# Patient Record
Sex: FEMALE | Race: WHITE | NOT HISPANIC OR LATINO | Employment: OTHER | RURAL
[De-identification: names, ages, dates, MRNs, and addresses within clinical notes are randomized per-mention and may not be internally consistent; named-entity substitution may affect disease eponyms.]

---

## 2018-03-12 ENCOUNTER — HISTORICAL (OUTPATIENT)
Dept: ADMINISTRATIVE | Facility: HOSPITAL | Age: 43
End: 2018-03-12

## 2018-03-13 LAB
LAB AP CLINICAL INFORMATION: NORMAL
LAB AP DIAGNOSIS - HISTORICAL: NORMAL
LAB AP GROSS PATHOLOGY - HISTORICAL: NORMAL
LAB AP SPECIMEN SUBMITTED - HISTORICAL: NORMAL

## 2018-04-10 ENCOUNTER — HISTORICAL (OUTPATIENT)
Dept: ADMINISTRATIVE | Facility: HOSPITAL | Age: 43
End: 2018-04-10

## 2020-03-12 ENCOUNTER — HISTORICAL (OUTPATIENT)
Dept: ADMINISTRATIVE | Facility: HOSPITAL | Age: 45
End: 2020-03-12

## 2020-03-20 ENCOUNTER — HISTORICAL (OUTPATIENT)
Dept: ADMINISTRATIVE | Facility: HOSPITAL | Age: 45
End: 2020-03-20

## 2020-03-20 LAB
BASOPHILS # BLD AUTO: 0.03 X10E3/UL (ref 0–0.2)
BASOPHILS NFR BLD AUTO: 0.5 % (ref 0–1)
BUN SERPL-MCNC: 11 MG/DL (ref 7–18)
CALCIUM SERPL-MCNC: 9.4 MG/DL (ref 8.5–10.1)
CHLORIDE SERPL-SCNC: 103 MMOL/L (ref 98–107)
CO2 SERPL-SCNC: 26 MMOL/L (ref 21–32)
CREAT SERPL-MCNC: 0.7 MG/DL (ref 0.55–1.02)
EOSINOPHIL # BLD AUTO: 0.04 X10E3/UL (ref 0–0.5)
EOSINOPHIL NFR BLD AUTO: 0.6 % (ref 1–4)
ERYTHROCYTE [DISTWIDTH] IN BLOOD BY AUTOMATED COUNT: 11.3 % (ref 11.5–14.5)
GLUCOSE SERPL-MCNC: 89 MG/DL (ref 74–106)
HCT VFR BLD AUTO: 42 % (ref 38–47)
HGB BLD-MCNC: 13.8 G/DL (ref 12–16)
IMM GRANULOCYTES # BLD AUTO: 0.02 X10E3/UL (ref 0–0.04)
IMM GRANULOCYTES NFR BLD: 0.3 % (ref 0–0.4)
LYMPHOCYTES # BLD AUTO: 2.15 X10E3/UL (ref 1–4.8)
LYMPHOCYTES NFR BLD AUTO: 32.8 % (ref 27–41)
MCH RBC QN AUTO: 31.2 PG (ref 27–31)
MCHC RBC AUTO-ENTMCNC: 32.9 G/DL (ref 32–36)
MCV RBC AUTO: 94.8 FL (ref 80–96)
MONOCYTES # BLD AUTO: 0.58 X10E3/UL (ref 0–0.8)
MONOCYTES NFR BLD AUTO: 8.8 % (ref 2–6)
MPC BLD CALC-MCNC: 8.4 FL (ref 9.4–12.4)
NEUTROPHILS # BLD AUTO: 3.74 X10E3/UL (ref 1.8–7.7)
NEUTROPHILS NFR BLD AUTO: 57 % (ref 53–65)
NRBC # BLD AUTO: 0 X10E3/UL (ref 0–0)
NRBC, AUTO (.00): 0 /100 (ref 0–0)
PLATELET # BLD AUTO: 322 X10E3/UL (ref 150–400)
POTASSIUM SERPL-SCNC: 3.9 MMOL/L (ref 3.5–5.1)
RBC # BLD AUTO: 4.43 X10E6/UL (ref 4.2–5.4)
SODIUM SERPL-SCNC: 137 MMOL/L (ref 136–145)
WBC # BLD AUTO: 6.56 X10E3/UL (ref 4.5–11)

## 2020-04-14 ENCOUNTER — HISTORICAL (OUTPATIENT)
Dept: ADMINISTRATIVE | Facility: HOSPITAL | Age: 45
End: 2020-04-14

## 2020-04-14 LAB — PAP RECOMMENDATION EXT: NORMAL

## 2020-04-15 LAB
LAB AP COMMENTS: NORMAL
LAB AP GENERAL CAT - HISTORICAL: NORMAL
LAB AP INTERPRETATION/RESULT - HISTORICAL: NEGATIVE
LAB AP SPECIMEN ADEQUACY - HISTORICAL: NORMAL
LAB AP SPECIMEN SUBMITTED - HISTORICAL: NORMAL

## 2020-05-12 ENCOUNTER — HISTORICAL (OUTPATIENT)
Dept: ADMINISTRATIVE | Facility: HOSPITAL | Age: 45
End: 2020-05-12

## 2020-10-27 ENCOUNTER — HISTORICAL (OUTPATIENT)
Dept: ADMINISTRATIVE | Facility: HOSPITAL | Age: 45
End: 2020-10-27

## 2021-03-31 RX ORDER — OMEPRAZOLE 20 MG/1
20 CAPSULE, DELAYED RELEASE ORAL DAILY
COMMUNITY
End: 2021-09-15 | Stop reason: SDUPTHER

## 2021-03-31 RX ORDER — ACETAMINOPHEN 500 MG
1 TABLET ORAL NIGHTLY
COMMUNITY
End: 2023-02-09

## 2021-03-31 RX ORDER — CITALOPRAM 40 MG/1
40 TABLET, FILM COATED ORAL DAILY
COMMUNITY
End: 2021-09-09 | Stop reason: SDUPTHER

## 2021-03-31 RX ORDER — FOLIC ACID 1 MG/1
2 TABLET ORAL NIGHTLY
COMMUNITY
End: 2021-04-27 | Stop reason: SDUPTHER

## 2021-03-31 RX ORDER — LANOLIN ALCOHOL/MO/W.PET/CERES
100 CREAM (GRAM) TOPICAL DAILY
COMMUNITY

## 2021-03-31 RX ORDER — LEVETIRACETAM 1000 MG/1
1000 TABLET ORAL 2 TIMES DAILY
COMMUNITY
End: 2021-04-07 | Stop reason: SDUPTHER

## 2021-03-31 RX ORDER — CHOLECALCIFEROL (VITAMIN D3) 25 MCG
1000 TABLET ORAL DAILY
COMMUNITY

## 2021-03-31 RX ORDER — MECLIZINE HYDROCHLORIDE 25 MG/1
25 TABLET ORAL 3 TIMES DAILY PRN
COMMUNITY
End: 2021-04-07 | Stop reason: SDUPTHER

## 2021-03-31 RX ORDER — LAMOTRIGINE 200 MG/1
1.5 TABLET ORAL 2 TIMES DAILY
COMMUNITY
End: 2021-04-07 | Stop reason: SDUPTHER

## 2021-04-07 ENCOUNTER — HOSPITAL ENCOUNTER (OUTPATIENT)
Dept: RADIOLOGY | Facility: HOSPITAL | Age: 46
Discharge: HOME | End: 2021-04-07
Payer: MEDICARE

## 2021-04-07 ENCOUNTER — OFFICE VISIT (OUTPATIENT)
Dept: NEUROLOGY | Facility: CLINIC | Age: 46
End: 2021-04-07
Payer: MEDICARE

## 2021-04-07 VITALS
SYSTOLIC BLOOD PRESSURE: 110 MMHG | HEIGHT: 64 IN | DIASTOLIC BLOOD PRESSURE: 78 MMHG | HEART RATE: 101 BPM | WEIGHT: 153 LBS | OXYGEN SATURATION: 98 % | BODY MASS INDEX: 26.12 KG/M2 | RESPIRATION RATE: 18 BRPM

## 2021-04-07 DIAGNOSIS — F32.A DEPRESSION, UNSPECIFIED DEPRESSION TYPE: ICD-10-CM

## 2021-04-07 DIAGNOSIS — R42 DIZZINESS: ICD-10-CM

## 2021-04-07 DIAGNOSIS — F41.9 ANXIETY: ICD-10-CM

## 2021-04-07 DIAGNOSIS — G44.229 CHRONIC TENSION-TYPE HEADACHE, NOT INTRACTABLE: ICD-10-CM

## 2021-04-07 DIAGNOSIS — G40.309 NONINTRACTABLE GENERALIZED IDIOPATHIC EPILEPSY WITHOUT STATUS EPILEPTICUS: Primary | ICD-10-CM

## 2021-04-07 DIAGNOSIS — Z12.39 SCREENING FOR BREAST CANCER: ICD-10-CM

## 2021-04-07 DIAGNOSIS — Z45.42 ENCOUNTER FOR FITTING AND ADJUSTMENT OF VAGUS NERVE STIMULATOR: ICD-10-CM

## 2021-04-07 PROCEDURE — 99999 PR PBB SHADOW E&M-EST. PATIENT-LVL IV: CPT | Mod: PBBFAC,,, | Performed by: NURSE PRACTITIONER

## 2021-04-07 PROCEDURE — 77067 SCR MAMMO BI INCL CAD: CPT | Mod: TC

## 2021-04-07 PROCEDURE — 99214 OFFICE O/P EST MOD 30 MIN: CPT | Mod: PBBFAC,25 | Performed by: NURSE PRACTITIONER

## 2021-04-07 PROCEDURE — 95970 ALYS NPGT W/O PRGRMG: CPT | Mod: PBBFAC | Performed by: NURSE PRACTITIONER

## 2021-04-07 PROCEDURE — 95970 PR ANALYZE NEUROSTIM,NO REPROG: ICD-10-PCS | Mod: S$PBB,,, | Performed by: NURSE PRACTITIONER

## 2021-04-07 PROCEDURE — 99213 PR OFFICE/OUTPT VISIT, EST, LEVL III, 20-29 MIN: ICD-10-PCS | Mod: S$PBB,,, | Performed by: NURSE PRACTITIONER

## 2021-04-07 PROCEDURE — 99999 PR PBB SHADOW E&M-EST. PATIENT-LVL IV: ICD-10-PCS | Mod: PBBFAC,,, | Performed by: NURSE PRACTITIONER

## 2021-04-07 PROCEDURE — 95970 ALYS NPGT W/O PRGRMG: CPT | Mod: S$PBB,,, | Performed by: NURSE PRACTITIONER

## 2021-04-07 PROCEDURE — 99213 OFFICE O/P EST LOW 20 MIN: CPT | Mod: S$PBB,,, | Performed by: NURSE PRACTITIONER

## 2021-04-07 RX ORDER — LAMOTRIGINE 200 MG/1
300 TABLET ORAL 2 TIMES DAILY
Qty: 270 TABLET | Refills: 3 | Status: SHIPPED | OUTPATIENT
Start: 2021-04-07 | End: 2021-08-09

## 2021-04-07 RX ORDER — LEVETIRACETAM 1000 MG/1
1000 TABLET ORAL 2 TIMES DAILY
Qty: 180 TABLET | Refills: 3 | Status: SHIPPED | OUTPATIENT
Start: 2021-04-07 | End: 2021-08-09

## 2021-04-07 RX ORDER — METHOCARBAMOL 500 MG/1
500 TABLET, FILM COATED ORAL 4 TIMES DAILY PRN
COMMUNITY

## 2021-04-07 RX ORDER — MECLIZINE HYDROCHLORIDE 25 MG/1
25 TABLET ORAL 3 TIMES DAILY PRN
Qty: 30 TABLET | Refills: 1 | Status: SHIPPED | OUTPATIENT
Start: 2021-04-07

## 2021-04-27 RX ORDER — FOLIC ACID 1 MG/1
2000 TABLET ORAL NIGHTLY
Qty: 180 TABLET | Refills: 3 | Status: SHIPPED | OUTPATIENT
Start: 2021-04-27 | End: 2022-04-04

## 2021-05-29 ENCOUNTER — HOSPITAL ENCOUNTER (EMERGENCY)
Facility: HOSPITAL | Age: 46
Discharge: HOME | End: 2021-05-29
Attending: FAMILY MEDICINE
Payer: MEDICARE

## 2021-05-29 VITALS
RESPIRATION RATE: 12 BRPM | BODY MASS INDEX: 27.6 KG/M2 | DIASTOLIC BLOOD PRESSURE: 68 MMHG | SYSTOLIC BLOOD PRESSURE: 106 MMHG | HEIGHT: 62 IN | OXYGEN SATURATION: 95 % | HEART RATE: 89 BPM | WEIGHT: 150 LBS

## 2021-05-29 DIAGNOSIS — G40.309 NONINTRACTABLE GENERALIZED IDIOPATHIC EPILEPSY WITHOUT STATUS EPILEPTICUS: Chronic | ICD-10-CM

## 2021-05-29 DIAGNOSIS — Z45.42 ENCOUNTER FOR FITTING AND ADJUSTMENT OF VAGUS NERVE STIMULATOR: ICD-10-CM

## 2021-05-29 DIAGNOSIS — G44.229 CHRONIC TENSION-TYPE HEADACHE, NOT INTRACTABLE: Chronic | ICD-10-CM

## 2021-05-29 DIAGNOSIS — F41.9 ANXIETY: Chronic | ICD-10-CM

## 2021-05-29 DIAGNOSIS — F32.A DEPRESSION: ICD-10-CM

## 2021-05-29 DIAGNOSIS — R56.9 SEIZURE: Primary | ICD-10-CM

## 2021-05-29 DIAGNOSIS — R42 DIZZINESS: ICD-10-CM

## 2021-05-29 LAB
ANION GAP SERPL CALCULATED.3IONS-SCNC: 12 MMOL/L (ref 7–16)
BASOPHILS # BLD AUTO: 0.05 K/UL (ref 0–0.2)
BASOPHILS NFR BLD AUTO: 0.9 % (ref 0–1)
BILIRUB UR QL STRIP: NEGATIVE
BUN SERPL-MCNC: 18 MG/DL (ref 7–18)
BUN/CREAT SERPL: 24 (ref 6–20)
CALCIUM SERPL-MCNC: 8.2 MG/DL (ref 8.5–10.1)
CHLORIDE SERPL-SCNC: 106 MMOL/L (ref 98–107)
CLARITY UR: ABNORMAL
CO2 SERPL-SCNC: 27 MMOL/L (ref 21–32)
COLOR UR: ABNORMAL
CREAT SERPL-MCNC: 0.74 MG/DL (ref 0.55–1.02)
DIFFERENTIAL METHOD BLD: ABNORMAL
EOSINOPHIL # BLD AUTO: 0.05 K/UL (ref 0–0.5)
EOSINOPHIL NFR BLD AUTO: 0.9 % (ref 1–4)
ERYTHROCYTE [DISTWIDTH] IN BLOOD BY AUTOMATED COUNT: 11.9 % (ref 11.5–14.5)
GLUCOSE SERPL-MCNC: 85 MG/DL (ref 74–106)
GLUCOSE UR STRIP-MCNC: NEGATIVE MG/DL
HCT VFR BLD AUTO: 38.1 % (ref 38–47)
HGB BLD-MCNC: 12.6 G/DL (ref 12–16)
IMM GRANULOCYTES # BLD AUTO: 0.02 K/UL (ref 0–0.04)
IMM GRANULOCYTES NFR BLD: 0.4 % (ref 0–0.4)
KETONES UR STRIP-SCNC: ABNORMAL MG/DL
LEUKOCYTE ESTERASE UR QL STRIP: NEGATIVE
LYMPHOCYTES # BLD AUTO: 1.04 K/UL (ref 1–4.8)
LYMPHOCYTES NFR BLD AUTO: 18.3 % (ref 27–41)
MCH RBC QN AUTO: 32.6 PG (ref 27–31)
MCHC RBC AUTO-ENTMCNC: 33.1 G/DL (ref 32–36)
MCV RBC AUTO: 98.7 FL (ref 80–96)
MONOCYTES # BLD AUTO: 0.34 K/UL (ref 0–0.8)
MONOCYTES NFR BLD AUTO: 6 % (ref 2–6)
MPC BLD CALC-MCNC: 9.4 FL (ref 9.4–12.4)
NEUTROPHILS # BLD AUTO: 4.19 K/UL (ref 1.8–7.7)
NEUTROPHILS NFR BLD AUTO: 73.5 % (ref 53–65)
NITRITE UR QL STRIP: NEGATIVE
NRBC # BLD AUTO: 0 X10E3/UL
NRBC, AUTO (.00): 0 %
PH UR STRIP: 7 PH UNITS
PLATELET # BLD AUTO: 281 K/UL (ref 150–400)
POTASSIUM SERPL-SCNC: 4.2 MMOL/L (ref 3.5–5.1)
PROT UR QL STRIP: ABNORMAL
RBC # BLD AUTO: 3.86 M/UL (ref 4.2–5.4)
RBC # UR STRIP: NEGATIVE /UL
SODIUM SERPL-SCNC: 141 MMOL/L (ref 136–145)
SP GR UR STRIP: 1.02
UROBILINOGEN UR STRIP-ACNC: 1 MG/DL
WBC # BLD AUTO: 5.69 K/UL (ref 4.5–11)

## 2021-05-29 PROCEDURE — 96365 THER/PROPH/DIAG IV INF INIT: CPT

## 2021-05-29 PROCEDURE — 85025 COMPLETE CBC W/AUTO DIFF WBC: CPT | Performed by: FAMILY MEDICINE

## 2021-05-29 PROCEDURE — 36415 COLL VENOUS BLD VENIPUNCTURE: CPT | Performed by: FAMILY MEDICINE

## 2021-05-29 PROCEDURE — 80048 BASIC METABOLIC PNL TOTAL CA: CPT | Performed by: FAMILY MEDICINE

## 2021-05-29 PROCEDURE — 81003 URINALYSIS AUTO W/O SCOPE: CPT | Performed by: FAMILY MEDICINE

## 2021-05-29 PROCEDURE — 96375 TX/PRO/DX INJ NEW DRUG ADDON: CPT

## 2021-05-29 PROCEDURE — 99284 EMERGENCY DEPT VISIT MOD MDM: CPT | Mod: 25

## 2021-05-29 PROCEDURE — 63600175 PHARM REV CODE 636 W HCPCS: Performed by: FAMILY MEDICINE

## 2021-05-29 PROCEDURE — 25000003 PHARM REV CODE 250: Performed by: FAMILY MEDICINE

## 2021-05-29 PROCEDURE — 99283 EMERGENCY DEPT VISIT LOW MDM: CPT | Mod: ,,, | Performed by: FAMILY MEDICINE

## 2021-05-29 PROCEDURE — 99283 PR EMERGENCY DEPT VISIT,LEVEL III: ICD-10-PCS | Mod: ,,, | Performed by: FAMILY MEDICINE

## 2021-05-29 PROCEDURE — 63600175 PHARM REV CODE 636 W HCPCS

## 2021-05-29 RX ORDER — TALC
3 POWDER (GRAM) TOPICAL
COMMUNITY
End: 2023-02-09

## 2021-05-29 RX ORDER — LORAZEPAM 2 MG/ML
2 INJECTION INTRAMUSCULAR
Status: COMPLETED | OUTPATIENT
Start: 2021-05-29 | End: 2021-05-29

## 2021-05-29 RX ORDER — LORAZEPAM 2 MG/ML
1 INJECTION INTRAMUSCULAR
Status: DISCONTINUED | OUTPATIENT
Start: 2021-05-29 | End: 2021-05-29

## 2021-05-29 RX ORDER — LEVETIRACETAM 1000 MG/1
1000 TABLET ORAL
COMMUNITY
End: 2021-08-09 | Stop reason: SDUPTHER

## 2021-05-29 RX ORDER — LORAZEPAM 2 MG/ML
INJECTION INTRAMUSCULAR
Status: COMPLETED
Start: 2021-05-29 | End: 2021-05-29

## 2021-05-29 RX ORDER — LAMOTRIGINE 200 MG/1
300 TABLET ORAL
COMMUNITY
End: 2021-08-09 | Stop reason: SDUPTHER

## 2021-05-29 RX ADMIN — LORAZEPAM 2 MG: 2 INJECTION INTRAMUSCULAR at 10:05

## 2021-05-29 RX ADMIN — LORAZEPAM 2 MG: 2 INJECTION INTRAMUSCULAR; INTRAVENOUS at 10:05

## 2021-05-29 RX ADMIN — LEVETIRACETAM 1000 MG: 100 INJECTION, SOLUTION, CONCENTRATE INTRAVENOUS at 10:05

## 2021-06-01 ENCOUNTER — OFFICE VISIT (OUTPATIENT)
Dept: NEUROLOGY | Facility: CLINIC | Age: 46
End: 2021-06-01
Payer: MEDICARE

## 2021-06-01 VITALS
HEIGHT: 62 IN | WEIGHT: 150 LBS | DIASTOLIC BLOOD PRESSURE: 78 MMHG | BODY MASS INDEX: 27.6 KG/M2 | RESPIRATION RATE: 18 BRPM | SYSTOLIC BLOOD PRESSURE: 132 MMHG | HEART RATE: 78 BPM

## 2021-06-01 DIAGNOSIS — G44.229 CHRONIC TENSION-TYPE HEADACHE, NOT INTRACTABLE: Chronic | ICD-10-CM

## 2021-06-01 DIAGNOSIS — F41.9 ANXIETY: Chronic | ICD-10-CM

## 2021-06-01 DIAGNOSIS — Z45.42 ENCOUNTER FOR FITTING AND ADJUSTMENT OF VAGUS NERVE STIMULATOR: ICD-10-CM

## 2021-06-01 DIAGNOSIS — G40.309 NONINTRACTABLE GENERALIZED IDIOPATHIC EPILEPSY WITHOUT STATUS EPILEPTICUS: Primary | Chronic | ICD-10-CM

## 2021-06-01 PROCEDURE — 95976 ALYS SMPL CN NPGT PRGRMG: CPT | Mod: ,,, | Performed by: NURSE PRACTITIONER

## 2021-06-01 PROCEDURE — 95976 PR ELEC ANALYSIS, IMPLT NEURO PULSE GEN, W/PRGRM, SMPL CRAN NERVE: ICD-10-PCS | Mod: ,,, | Performed by: NURSE PRACTITIONER

## 2021-06-01 PROCEDURE — 99215 OFFICE O/P EST HI 40 MIN: CPT | Mod: PBBFAC | Performed by: NURSE PRACTITIONER

## 2021-06-01 PROCEDURE — 99214 OFFICE O/P EST MOD 30 MIN: CPT | Mod: S$PBB,,, | Performed by: NURSE PRACTITIONER

## 2021-06-01 PROCEDURE — 99214 PR OFFICE/OUTPT VISIT, EST, LEVL IV, 30-39 MIN: ICD-10-PCS | Mod: S$PBB,,, | Performed by: NURSE PRACTITIONER

## 2021-06-08 ENCOUNTER — TELEPHONE (OUTPATIENT)
Dept: PRIMARY CARE CLINIC | Facility: CLINIC | Age: 46
End: 2021-06-08

## 2021-07-07 ENCOUNTER — OFFICE VISIT (OUTPATIENT)
Dept: NEUROLOGY | Facility: CLINIC | Age: 46
End: 2021-07-07
Payer: MEDICARE

## 2021-07-07 ENCOUNTER — HOSPITAL ENCOUNTER (EMERGENCY)
Facility: HOSPITAL | Age: 46
Discharge: HOME | End: 2021-07-07
Payer: MEDICARE

## 2021-07-07 VITALS
WEIGHT: 150 LBS | SYSTOLIC BLOOD PRESSURE: 122 MMHG | RESPIRATION RATE: 18 BRPM | HEIGHT: 62 IN | DIASTOLIC BLOOD PRESSURE: 80 MMHG | BODY MASS INDEX: 27.6 KG/M2 | OXYGEN SATURATION: 96 % | HEART RATE: 115 BPM

## 2021-07-07 VITALS
BODY MASS INDEX: 29.44 KG/M2 | DIASTOLIC BLOOD PRESSURE: 64 MMHG | TEMPERATURE: 100 F | WEIGHT: 160 LBS | HEIGHT: 62 IN | SYSTOLIC BLOOD PRESSURE: 110 MMHG | HEART RATE: 78 BPM | OXYGEN SATURATION: 98 % | RESPIRATION RATE: 13 BRPM

## 2021-07-07 DIAGNOSIS — R42 DIZZINESS: ICD-10-CM

## 2021-07-07 DIAGNOSIS — F32.A DEPRESSION: ICD-10-CM

## 2021-07-07 DIAGNOSIS — G40.309 NONINTRACTABLE GENERALIZED IDIOPATHIC EPILEPSY WITHOUT STATUS EPILEPTICUS: Primary | Chronic | ICD-10-CM

## 2021-07-07 DIAGNOSIS — F32.A DEPRESSION, UNSPECIFIED DEPRESSION TYPE: ICD-10-CM

## 2021-07-07 DIAGNOSIS — Z45.42 ENCOUNTER FOR FITTING AND ADJUSTMENT OF VAGUS NERVE STIMULATOR: ICD-10-CM

## 2021-07-07 DIAGNOSIS — G44.229 CHRONIC TENSION-TYPE HEADACHE, NOT INTRACTABLE: Chronic | ICD-10-CM

## 2021-07-07 DIAGNOSIS — G40.309 NONINTRACTABLE GENERALIZED IDIOPATHIC EPILEPSY WITHOUT STATUS EPILEPTICUS: Chronic | ICD-10-CM

## 2021-07-07 DIAGNOSIS — F41.9 ANXIETY: Chronic | ICD-10-CM

## 2021-07-07 DIAGNOSIS — G40.909 SEIZURE DISORDER: Primary | ICD-10-CM

## 2021-07-07 LAB
ALBUMIN SERPL BCP-MCNC: 3.3 G/DL (ref 3.5–5)
ALBUMIN/GLOB SERPL: 0.8 {RATIO}
ALP SERPL-CCNC: 98 U/L (ref 37–98)
ALT SERPL W P-5'-P-CCNC: 26 U/L (ref 13–56)
ANION GAP SERPL CALCULATED.3IONS-SCNC: 24 MMOL/L (ref 7–16)
AST SERPL W P-5'-P-CCNC: 21 U/L (ref 15–37)
BASOPHILS # BLD AUTO: 0.07 K/UL (ref 0–0.2)
BASOPHILS NFR BLD AUTO: 0.8 % (ref 0–1)
BILIRUB SERPL-MCNC: 0.4 MG/DL (ref 0–1.2)
BILIRUB UR QL STRIP: NEGATIVE
BUN SERPL-MCNC: 9 MG/DL (ref 7–18)
BUN/CREAT SERPL: 8 (ref 6–20)
CALCIUM SERPL-MCNC: 9 MG/DL (ref 8.5–10.1)
CHLORIDE SERPL-SCNC: 105 MMOL/L (ref 98–107)
CLARITY UR: CLEAR
CO2 SERPL-SCNC: 18 MMOL/L (ref 21–32)
COLOR UR: NORMAL
CREAT SERPL-MCNC: 1.2 MG/DL (ref 0.55–1.02)
DIFFERENTIAL METHOD BLD: ABNORMAL
EOSINOPHIL # BLD AUTO: 0.08 K/UL (ref 0–0.5)
EOSINOPHIL NFR BLD AUTO: 0.9 % (ref 1–4)
ERYTHROCYTE [DISTWIDTH] IN BLOOD BY AUTOMATED COUNT: 11.2 % (ref 11.5–14.5)
GLOBULIN SER-MCNC: 4.3 G/DL (ref 2–4)
GLUCOSE SERPL-MCNC: 135 MG/DL (ref 74–106)
GLUCOSE UR STRIP-MCNC: NEGATIVE MG/DL
HCT VFR BLD AUTO: 41.3 % (ref 38–47)
HGB BLD-MCNC: 13.1 G/DL (ref 12–16)
IMM GRANULOCYTES # BLD AUTO: 0.03 K/UL (ref 0–0.04)
IMM GRANULOCYTES NFR BLD: 0.3 % (ref 0–0.4)
KETONES UR STRIP-SCNC: NEGATIVE MG/DL
LAMOTRIGINE SERPL-MCNC: 17.7 ΜG/ML (ref 2.5–15)
LEUKOCYTE ESTERASE UR QL STRIP: NEGATIVE
LEVETIRACETAM SERPL-MCNC: 52.5 ΜG/ML (ref 12–46)
LYMPHOCYTES # BLD AUTO: 2.91 K/UL (ref 1–4.8)
LYMPHOCYTES NFR BLD AUTO: 32.2 % (ref 27–41)
MCH RBC QN AUTO: 31.1 PG (ref 27–31)
MCHC RBC AUTO-ENTMCNC: 31.7 G/DL (ref 32–36)
MCV RBC AUTO: 98.1 FL (ref 80–96)
MONOCYTES # BLD AUTO: 0.56 K/UL (ref 0–0.8)
MONOCYTES NFR BLD AUTO: 6.2 % (ref 2–6)
MPC BLD CALC-MCNC: 8.7 FL (ref 9.4–12.4)
NEUTROPHILS # BLD AUTO: 5.38 K/UL (ref 1.8–7.7)
NEUTROPHILS NFR BLD AUTO: 59.6 % (ref 53–65)
NITRITE UR QL STRIP: NEGATIVE
NRBC # BLD AUTO: 0 X10E3/UL
NRBC, AUTO (.00): 0 %
PH UR STRIP: 6 PH UNITS
PLATELET # BLD AUTO: 396 K/UL (ref 150–400)
POTASSIUM SERPL-SCNC: 3.6 MMOL/L (ref 3.5–5.1)
PROLACTIN SERPL-MCNC: 103.4 NG/ML
PROT SERPL-MCNC: 7.6 G/DL (ref 6.4–8.2)
PROT UR QL STRIP: NEGATIVE
RBC # BLD AUTO: 4.21 M/UL (ref 4.2–5.4)
RBC # UR STRIP: NEGATIVE /UL
SODIUM SERPL-SCNC: 143 MMOL/L (ref 136–145)
SP GR UR STRIP: <=1.005
UROBILINOGEN UR STRIP-ACNC: 0.2 MG/DL
WBC # BLD AUTO: 9.03 K/UL (ref 4.5–11)

## 2021-07-07 PROCEDURE — 80177 DRUG SCRN QUAN LEVETIRACETAM: CPT | Performed by: NURSE PRACTITIONER

## 2021-07-07 PROCEDURE — 96361 HYDRATE IV INFUSION ADD-ON: CPT

## 2021-07-07 PROCEDURE — 85025 COMPLETE CBC W/AUTO DIFF WBC: CPT | Performed by: NURSE PRACTITIONER

## 2021-07-07 PROCEDURE — 63600175 PHARM REV CODE 636 W HCPCS: Performed by: NURSE PRACTITIONER

## 2021-07-07 PROCEDURE — 80175 DRUG SCREEN QUAN LAMOTRIGINE: CPT | Performed by: NURSE PRACTITIONER

## 2021-07-07 PROCEDURE — 99215 OFFICE O/P EST HI 40 MIN: CPT | Mod: PBBFAC | Performed by: NURSE PRACTITIONER

## 2021-07-07 PROCEDURE — 81003 URINALYSIS AUTO W/O SCOPE: CPT | Performed by: NURSE PRACTITIONER

## 2021-07-07 PROCEDURE — 96365 THER/PROPH/DIAG IV INF INIT: CPT

## 2021-07-07 PROCEDURE — 80053 COMPREHEN METABOLIC PANEL: CPT | Performed by: NURSE PRACTITIONER

## 2021-07-07 PROCEDURE — 84146 ASSAY OF PROLACTIN: CPT | Performed by: NURSE PRACTITIONER

## 2021-07-07 PROCEDURE — 99283 PR EMERGENCY DEPT VISIT,LEVEL III: ICD-10-PCS | Mod: ,,, | Performed by: NURSE PRACTITIONER

## 2021-07-07 PROCEDURE — 96375 TX/PRO/DX INJ NEW DRUG ADDON: CPT

## 2021-07-07 PROCEDURE — 36415 COLL VENOUS BLD VENIPUNCTURE: CPT | Performed by: NURSE PRACTITIONER

## 2021-07-07 PROCEDURE — 99214 PR OFFICE/OUTPT VISIT, EST, LEVL IV, 30-39 MIN: ICD-10-PCS | Mod: S$PBB,,, | Performed by: NURSE PRACTITIONER

## 2021-07-07 PROCEDURE — 25000003 PHARM REV CODE 250: Performed by: NURSE PRACTITIONER

## 2021-07-07 PROCEDURE — 99283 EMERGENCY DEPT VISIT LOW MDM: CPT | Mod: ,,, | Performed by: NURSE PRACTITIONER

## 2021-07-07 PROCEDURE — 99284 EMERGENCY DEPT VISIT MOD MDM: CPT | Mod: 25

## 2021-07-07 PROCEDURE — 99214 OFFICE O/P EST MOD 30 MIN: CPT | Mod: S$PBB,,, | Performed by: NURSE PRACTITIONER

## 2021-07-07 RX ORDER — ACETAMINOPHEN 500 MG
1000 TABLET ORAL
Status: COMPLETED | OUTPATIENT
Start: 2021-07-07 | End: 2021-07-07

## 2021-07-07 RX ORDER — LORAZEPAM 2 MG/ML
1 INJECTION INTRAMUSCULAR
Status: COMPLETED | OUTPATIENT
Start: 2021-07-07 | End: 2021-07-07

## 2021-07-07 RX ADMIN — SODIUM CHLORIDE 1000 ML: 9 INJECTION, SOLUTION INTRAVENOUS at 09:07

## 2021-07-07 RX ADMIN — LEVETIRACETAM 1000 MG: 100 INJECTION, SOLUTION, CONCENTRATE INTRAVENOUS at 09:07

## 2021-07-07 RX ADMIN — ACETAMINOPHEN 1000 MG: 500 TABLET ORAL at 09:07

## 2021-07-07 RX ADMIN — LORAZEPAM 1 MG: 2 INJECTION INTRAMUSCULAR; INTRAVENOUS at 09:07

## 2021-08-04 ENCOUNTER — HOSPITAL ENCOUNTER (EMERGENCY)
Facility: HOSPITAL | Age: 46
Discharge: HOME | End: 2021-08-04
Attending: EMERGENCY MEDICINE
Payer: MEDICARE

## 2021-08-04 VITALS
OXYGEN SATURATION: 91 % | BODY MASS INDEX: 26.68 KG/M2 | DIASTOLIC BLOOD PRESSURE: 67 MMHG | HEART RATE: 89 BPM | HEIGHT: 62 IN | WEIGHT: 145 LBS | SYSTOLIC BLOOD PRESSURE: 114 MMHG | TEMPERATURE: 99 F | RESPIRATION RATE: 18 BRPM

## 2021-08-04 DIAGNOSIS — G44.229 CHRONIC TENSION-TYPE HEADACHE, NOT INTRACTABLE: Chronic | ICD-10-CM

## 2021-08-04 DIAGNOSIS — G40.309 NONINTRACTABLE GENERALIZED IDIOPATHIC EPILEPSY WITHOUT STATUS EPILEPTICUS: Chronic | ICD-10-CM

## 2021-08-04 DIAGNOSIS — F41.9 ANXIETY: Chronic | ICD-10-CM

## 2021-08-04 DIAGNOSIS — R42 DIZZINESS: ICD-10-CM

## 2021-08-04 DIAGNOSIS — G44.229 CHRONIC TENSION-TYPE HEADACHE, NOT INTRACTABLE: ICD-10-CM

## 2021-08-04 DIAGNOSIS — Z45.42 ENCOUNTER FOR FITTING AND ADJUSTMENT OF VAGUS NERVE STIMULATOR: ICD-10-CM

## 2021-08-04 DIAGNOSIS — F32.A DEPRESSION: ICD-10-CM

## 2021-08-04 DIAGNOSIS — N39.0 URINARY TRACT INFECTION WITHOUT HEMATURIA, SITE UNSPECIFIED: ICD-10-CM

## 2021-08-04 DIAGNOSIS — R56.9 SEIZURE: Primary | ICD-10-CM

## 2021-08-04 LAB
ALBUMIN SERPL BCP-MCNC: 3.1 G/DL (ref 3.5–5)
ALBUMIN/GLOB SERPL: 0.8 {RATIO}
ALP SERPL-CCNC: 107 U/L (ref 37–98)
ALT SERPL W P-5'-P-CCNC: 55 U/L (ref 13–56)
ANION GAP SERPL CALCULATED.3IONS-SCNC: 12 MMOL/L (ref 7–16)
AST SERPL W P-5'-P-CCNC: 55 U/L (ref 15–37)
BACTERIA #/AREA URNS HPF: ABNORMAL /HPF
BASOPHILS # BLD AUTO: 0.04 K/UL (ref 0–0.2)
BASOPHILS NFR BLD AUTO: 0.5 % (ref 0–1)
BILIRUB SERPL-MCNC: 0.4 MG/DL (ref 0–1.2)
BILIRUB UR QL STRIP: NEGATIVE
BUN SERPL-MCNC: 15 MG/DL (ref 7–18)
BUN/CREAT SERPL: 17 (ref 6–20)
CALCIUM SERPL-MCNC: 8.6 MG/DL (ref 8.5–10.1)
CHLORIDE SERPL-SCNC: 107 MMOL/L (ref 98–107)
CLARITY UR: ABNORMAL
CO2 SERPL-SCNC: 28 MMOL/L (ref 21–32)
COLOR UR: YELLOW
CREAT SERPL-MCNC: 0.88 MG/DL (ref 0.55–1.02)
DIFFERENTIAL METHOD BLD: ABNORMAL
EOSINOPHIL # BLD AUTO: 0.07 K/UL (ref 0–0.5)
EOSINOPHIL NFR BLD AUTO: 0.9 % (ref 1–4)
ERYTHROCYTE [DISTWIDTH] IN BLOOD BY AUTOMATED COUNT: 11.3 % (ref 11.5–14.5)
GLOBULIN SER-MCNC: 3.7 G/DL (ref 2–4)
GLUCOSE SERPL-MCNC: 99 MG/DL (ref 74–106)
GLUCOSE UR STRIP-MCNC: NEGATIVE MG/DL
HCT VFR BLD AUTO: 37 % (ref 38–47)
HGB BLD-MCNC: 12.2 G/DL (ref 12–16)
IMM GRANULOCYTES # BLD AUTO: 0.02 K/UL (ref 0–0.04)
IMM GRANULOCYTES NFR BLD: 0.2 % (ref 0–0.4)
KETONES UR STRIP-SCNC: NEGATIVE MG/DL
LEUKOCYTE ESTERASE UR QL STRIP: ABNORMAL
LYMPHOCYTES # BLD AUTO: 2.77 K/UL (ref 1–4.8)
LYMPHOCYTES NFR BLD AUTO: 34.6 % (ref 27–41)
MCH RBC QN AUTO: 30.7 PG (ref 27–31)
MCHC RBC AUTO-ENTMCNC: 33 G/DL (ref 32–36)
MCV RBC AUTO: 93.2 FL (ref 80–96)
MONOCYTES # BLD AUTO: 0.68 K/UL (ref 0–0.8)
MONOCYTES NFR BLD AUTO: 8.5 % (ref 2–6)
MPC BLD CALC-MCNC: 9.1 FL (ref 9.4–12.4)
MUCOUS THREADS #/AREA URNS HPF: ABNORMAL /HPF
NEUTROPHILS # BLD AUTO: 4.43 K/UL (ref 1.8–7.7)
NEUTROPHILS NFR BLD AUTO: 55.3 % (ref 53–65)
NITRITE UR QL STRIP: POSITIVE
NRBC # BLD AUTO: 0 X10E3/UL
NRBC, AUTO (.00): 0 %
PH UR STRIP: 6 PH UNITS
PLATELET # BLD AUTO: 323 K/UL (ref 150–400)
POTASSIUM SERPL-SCNC: 4.1 MMOL/L (ref 3.5–5.1)
PROT SERPL-MCNC: 6.8 G/DL (ref 6.4–8.2)
PROT UR QL STRIP: NEGATIVE
RBC # BLD AUTO: 3.97 M/UL (ref 4.2–5.4)
RBC # UR STRIP: ABNORMAL /UL
RBC #/AREA URNS HPF: ABNORMAL /HPF
SODIUM SERPL-SCNC: 143 MMOL/L (ref 136–145)
SP GR UR STRIP: 1.02
SQUAMOUS #/AREA URNS LPF: ABNORMAL /LPF
UROBILINOGEN UR STRIP-ACNC: 1 MG/DL
WBC # BLD AUTO: 8.01 K/UL (ref 4.5–11)
WBC #/AREA URNS HPF: ABNORMAL /HPF

## 2021-08-04 PROCEDURE — 36415 COLL VENOUS BLD VENIPUNCTURE: CPT | Performed by: EMERGENCY MEDICINE

## 2021-08-04 PROCEDURE — 99284 EMERGENCY DEPT VISIT MOD MDM: CPT | Mod: 25

## 2021-08-04 PROCEDURE — 87040 BLOOD CULTURE FOR BACTERIA: CPT | Mod: 59 | Performed by: EMERGENCY MEDICINE

## 2021-08-04 PROCEDURE — 87077 CULTURE AEROBIC IDENTIFY: CPT | Performed by: EMERGENCY MEDICINE

## 2021-08-04 PROCEDURE — 81003 URINALYSIS AUTO W/O SCOPE: CPT | Performed by: EMERGENCY MEDICINE

## 2021-08-04 PROCEDURE — 87186 SC STD MICRODIL/AGAR DIL: CPT | Performed by: EMERGENCY MEDICINE

## 2021-08-04 PROCEDURE — 99284 EMERGENCY DEPT VISIT MOD MDM: CPT | Mod: ,,, | Performed by: EMERGENCY MEDICINE

## 2021-08-04 PROCEDURE — 96365 THER/PROPH/DIAG IV INF INIT: CPT

## 2021-08-04 PROCEDURE — 93005 ELECTROCARDIOGRAM TRACING: CPT

## 2021-08-04 PROCEDURE — 63600175 PHARM REV CODE 636 W HCPCS: Performed by: EMERGENCY MEDICINE

## 2021-08-04 PROCEDURE — 93010 ELECTROCARDIOGRAM REPORT: CPT | Mod: ,,, | Performed by: INTERNAL MEDICINE

## 2021-08-04 PROCEDURE — 80053 COMPREHEN METABOLIC PANEL: CPT | Performed by: EMERGENCY MEDICINE

## 2021-08-04 PROCEDURE — 25000003 PHARM REV CODE 250: Performed by: EMERGENCY MEDICINE

## 2021-08-04 PROCEDURE — 99284 PR EMERGENCY DEPT VISIT,LEVEL IV: ICD-10-PCS | Mod: ,,, | Performed by: EMERGENCY MEDICINE

## 2021-08-04 PROCEDURE — 85025 COMPLETE CBC W/AUTO DIFF WBC: CPT | Performed by: EMERGENCY MEDICINE

## 2021-08-04 PROCEDURE — 96367 TX/PROPH/DG ADDL SEQ IV INF: CPT

## 2021-08-04 PROCEDURE — 81001 URINALYSIS AUTO W/SCOPE: CPT | Performed by: EMERGENCY MEDICINE

## 2021-08-04 PROCEDURE — 93010 EKG 12-LEAD: ICD-10-PCS | Mod: ,,, | Performed by: INTERNAL MEDICINE

## 2021-08-04 RX ORDER — SULFAMETHOXAZOLE AND TRIMETHOPRIM 800; 160 MG/1; MG/1
1 TABLET ORAL 2 TIMES DAILY
Qty: 14 TABLET | Refills: 0 | Status: SHIPPED | OUTPATIENT
Start: 2021-08-04 | End: 2021-08-11

## 2021-08-04 RX ADMIN — CEFTRIAXONE SODIUM 2 G: 2 INJECTION, POWDER, FOR SOLUTION INTRAMUSCULAR; INTRAVENOUS at 10:08

## 2021-08-04 RX ADMIN — LEVETIRACETAM 1000 MG: 100 INJECTION, SOLUTION INTRAVENOUS at 07:08

## 2021-08-06 LAB — UA COMPLETE W REFLEX CULTURE PNL UR: ABNORMAL

## 2021-08-09 ENCOUNTER — OFFICE VISIT (OUTPATIENT)
Dept: NEUROLOGY | Facility: CLINIC | Age: 46
End: 2021-08-09
Payer: MEDICARE

## 2021-08-09 VITALS
RESPIRATION RATE: 18 BRPM | SYSTOLIC BLOOD PRESSURE: 124 MMHG | HEART RATE: 94 BPM | WEIGHT: 145 LBS | HEIGHT: 62 IN | DIASTOLIC BLOOD PRESSURE: 84 MMHG | OXYGEN SATURATION: 96 % | BODY MASS INDEX: 26.68 KG/M2

## 2021-08-09 DIAGNOSIS — G44.229 CHRONIC TENSION-TYPE HEADACHE, NOT INTRACTABLE: Chronic | ICD-10-CM

## 2021-08-09 DIAGNOSIS — R42 DIZZINESS: ICD-10-CM

## 2021-08-09 DIAGNOSIS — G40.309 NONINTRACTABLE GENERALIZED IDIOPATHIC EPILEPSY WITHOUT STATUS EPILEPTICUS: Primary | Chronic | ICD-10-CM

## 2021-08-09 DIAGNOSIS — F32.A DEPRESSION, UNSPECIFIED DEPRESSION TYPE: ICD-10-CM

## 2021-08-09 DIAGNOSIS — Z45.42 ENCOUNTER FOR FITTING AND ADJUSTMENT OF VAGUS NERVE STIMULATOR: ICD-10-CM

## 2021-08-09 DIAGNOSIS — F41.9 ANXIETY: Chronic | ICD-10-CM

## 2021-08-09 PROCEDURE — 99215 OFFICE O/P EST HI 40 MIN: CPT | Mod: PBBFAC | Performed by: NURSE PRACTITIONER

## 2021-08-09 PROCEDURE — 99214 OFFICE O/P EST MOD 30 MIN: CPT | Mod: S$PBB,,, | Performed by: NURSE PRACTITIONER

## 2021-08-09 PROCEDURE — 99214 PR OFFICE/OUTPT VISIT, EST, LEVL IV, 30-39 MIN: ICD-10-PCS | Mod: S$PBB,,, | Performed by: NURSE PRACTITIONER

## 2021-08-09 RX ORDER — LEVETIRACETAM 1000 MG/1
1000 TABLET ORAL 2 TIMES DAILY
Qty: 270 TABLET | Refills: 3 | Status: SHIPPED | OUTPATIENT
Start: 2021-08-09 | End: 2022-08-10 | Stop reason: SDUPTHER

## 2021-08-09 RX ORDER — LAMOTRIGINE 200 MG/1
300 TABLET ORAL 2 TIMES DAILY
Qty: 270 TABLET | Refills: 3 | Status: SHIPPED | OUTPATIENT
Start: 2021-08-09 | End: 2022-04-04

## 2021-08-10 LAB
BACTERIA BLD CULT: NORMAL
BACTERIA BLD CULT: NORMAL

## 2021-09-09 ENCOUNTER — OFFICE VISIT (OUTPATIENT)
Dept: NEUROLOGY | Facility: CLINIC | Age: 46
End: 2021-09-09
Payer: MEDICARE

## 2021-09-09 VITALS
HEIGHT: 62 IN | SYSTOLIC BLOOD PRESSURE: 124 MMHG | OXYGEN SATURATION: 96 % | HEART RATE: 83 BPM | WEIGHT: 140 LBS | BODY MASS INDEX: 25.76 KG/M2 | DIASTOLIC BLOOD PRESSURE: 80 MMHG

## 2021-09-09 DIAGNOSIS — G44.229 CHRONIC TENSION-TYPE HEADACHE, NOT INTRACTABLE: Chronic | ICD-10-CM

## 2021-09-09 DIAGNOSIS — F32.A DEPRESSION, UNSPECIFIED DEPRESSION TYPE: ICD-10-CM

## 2021-09-09 DIAGNOSIS — G40.309 NONINTRACTABLE GENERALIZED IDIOPATHIC EPILEPSY WITHOUT STATUS EPILEPTICUS: Primary | Chronic | ICD-10-CM

## 2021-09-09 DIAGNOSIS — F41.9 ANXIETY: Chronic | ICD-10-CM

## 2021-09-09 DIAGNOSIS — Z45.42 ENCOUNTER FOR FITTING AND ADJUSTMENT OF VAGUS NERVE STIMULATOR: ICD-10-CM

## 2021-09-09 PROCEDURE — 99213 OFFICE O/P EST LOW 20 MIN: CPT | Mod: S$PBB,,, | Performed by: NURSE PRACTITIONER

## 2021-09-09 PROCEDURE — 95970 ALYS NPGT W/O PRGRMG: CPT | Mod: S$PBB,,, | Performed by: NURSE PRACTITIONER

## 2021-09-09 PROCEDURE — 99213 PR OFFICE/OUTPT VISIT, EST, LEVL III, 20-29 MIN: ICD-10-PCS | Mod: S$PBB,,, | Performed by: NURSE PRACTITIONER

## 2021-09-09 PROCEDURE — 99214 OFFICE O/P EST MOD 30 MIN: CPT | Mod: PBBFAC | Performed by: NURSE PRACTITIONER

## 2021-09-09 PROCEDURE — 95970 PR ANALYZE NEUROSTIM,NO REPROG: ICD-10-PCS | Mod: S$PBB,,, | Performed by: NURSE PRACTITIONER

## 2021-09-09 PROCEDURE — 95970 ALYS NPGT W/O PRGRMG: CPT | Mod: PBBFAC | Performed by: NURSE PRACTITIONER

## 2021-09-09 RX ORDER — CITALOPRAM 40 MG/1
40 TABLET, FILM COATED ORAL DAILY
Qty: 90 TABLET | Refills: 3 | Status: SHIPPED | OUTPATIENT
Start: 2021-09-09 | End: 2022-02-23 | Stop reason: SDUPTHER

## 2021-09-15 ENCOUNTER — OFFICE VISIT (OUTPATIENT)
Dept: FAMILY MEDICINE | Facility: CLINIC | Age: 46
End: 2021-09-15
Payer: MEDICARE

## 2021-09-15 DIAGNOSIS — K21.9 GASTROESOPHAGEAL REFLUX DISEASE, UNSPECIFIED WHETHER ESOPHAGITIS PRESENT: ICD-10-CM

## 2021-09-15 DIAGNOSIS — R09.81 NASAL CONGESTION: ICD-10-CM

## 2021-09-15 DIAGNOSIS — J02.9 SORE THROAT: Primary | ICD-10-CM

## 2021-09-15 DIAGNOSIS — E78.5 HYPERLIPIDEMIA, UNSPECIFIED HYPERLIPIDEMIA TYPE: ICD-10-CM

## 2021-09-15 DIAGNOSIS — E55.9 VITAMIN D DEFICIENCY: ICD-10-CM

## 2021-09-15 DIAGNOSIS — J10.1 INFLUENZA B: ICD-10-CM

## 2021-09-15 DIAGNOSIS — R05.9 COUGH: ICD-10-CM

## 2021-09-15 LAB
CTP QC/QA: YES
CTP QC/QA: YES
FLUAV AG NPH QL: NEGATIVE
FLUBV AG NPH QL: POSITIVE
S PYO RRNA THROAT QL PROBE: NEGATIVE
SARS-COV-2 AG RESP QL IA.RAPID: NEGATIVE

## 2021-09-15 PROCEDURE — 99214 OFFICE O/P EST MOD 30 MIN: CPT | Mod: ,,, | Performed by: FAMILY MEDICINE

## 2021-09-15 PROCEDURE — 99214 PR OFFICE/OUTPT VISIT, EST, LEVL IV, 30-39 MIN: ICD-10-PCS | Mod: ,,, | Performed by: FAMILY MEDICINE

## 2021-09-15 PROCEDURE — 87428 SARSCOV & INF VIR A&B AG IA: CPT | Mod: RHCUB | Performed by: FAMILY MEDICINE

## 2021-09-15 PROCEDURE — 87880 STREP A ASSAY W/OPTIC: CPT | Mod: RHCUB | Performed by: FAMILY MEDICINE

## 2021-09-15 RX ORDER — OSELTAMIVIR PHOSPHATE 75 MG/1
75 CAPSULE ORAL 2 TIMES DAILY
Qty: 10 CAPSULE | Refills: 0 | Status: SHIPPED | OUTPATIENT
Start: 2021-09-15 | End: 2021-09-20

## 2021-09-15 RX ORDER — OMEPRAZOLE 20 MG/1
20 CAPSULE, DELAYED RELEASE ORAL DAILY
Qty: 30 CAPSULE | Refills: 11 | Status: SHIPPED | OUTPATIENT
Start: 2021-09-15 | End: 2022-03-11 | Stop reason: SDUPTHER

## 2021-09-22 VITALS
TEMPERATURE: 97 F | HEART RATE: 74 BPM | BODY MASS INDEX: 26.68 KG/M2 | RESPIRATION RATE: 18 BRPM | OXYGEN SATURATION: 98 % | HEIGHT: 62 IN | WEIGHT: 145 LBS | SYSTOLIC BLOOD PRESSURE: 130 MMHG | DIASTOLIC BLOOD PRESSURE: 64 MMHG

## 2021-10-06 ENCOUNTER — TELEPHONE (OUTPATIENT)
Dept: FAMILY MEDICINE | Facility: CLINIC | Age: 46
End: 2021-10-06

## 2021-10-06 DIAGNOSIS — J02.9 SORE THROAT: Primary | ICD-10-CM

## 2021-10-07 RX ORDER — AZITHROMYCIN 250 MG/1
TABLET, FILM COATED ORAL
Qty: 6 TABLET | Refills: 0 | Status: SHIPPED | OUTPATIENT
Start: 2021-10-07 | End: 2022-03-11 | Stop reason: ALTCHOICE

## 2021-11-04 DIAGNOSIS — Z87.442 HISTORY OF KIDNEY STONES: Primary | ICD-10-CM

## 2021-12-08 ENCOUNTER — TELEPHONE (OUTPATIENT)
Dept: FAMILY MEDICINE | Facility: CLINIC | Age: 46
End: 2021-12-08
Payer: MEDICARE

## 2021-12-08 DIAGNOSIS — J02.9 SORE THROAT: Primary | ICD-10-CM

## 2021-12-08 RX ORDER — METHYLPREDNISOLONE 4 MG/1
TABLET ORAL
Qty: 1 EACH | Refills: 0 | Status: SHIPPED | OUTPATIENT
Start: 2021-12-08 | End: 2022-03-11 | Stop reason: ALTCHOICE

## 2021-12-08 RX ORDER — CEFUROXIME AXETIL 500 MG/1
500 TABLET ORAL 2 TIMES DAILY
Qty: 20 TABLET | Refills: 0 | Status: SHIPPED | OUTPATIENT
Start: 2021-12-08 | End: 2021-12-18

## 2022-02-23 ENCOUNTER — TELEPHONE (OUTPATIENT)
Dept: FAMILY MEDICINE | Facility: CLINIC | Age: 47
End: 2022-02-23
Payer: MEDICARE

## 2022-02-23 DIAGNOSIS — F32.A DEPRESSION, UNSPECIFIED DEPRESSION TYPE: ICD-10-CM

## 2022-02-23 DIAGNOSIS — F41.9 ANXIETY: Chronic | ICD-10-CM

## 2022-02-23 RX ORDER — CITALOPRAM 40 MG/1
40 TABLET, FILM COATED ORAL DAILY
Qty: 30 TABLET | Refills: 11 | Status: SHIPPED | OUTPATIENT
Start: 2022-02-23 | End: 2023-02-09 | Stop reason: SDUPTHER

## 2022-03-07 DIAGNOSIS — E78.5 HYPERLIPIDEMIA, UNSPECIFIED HYPERLIPIDEMIA TYPE: ICD-10-CM

## 2022-03-07 DIAGNOSIS — E55.9 VITAMIN D DEFICIENCY: Primary | ICD-10-CM

## 2022-03-11 ENCOUNTER — OFFICE VISIT (OUTPATIENT)
Dept: FAMILY MEDICINE | Facility: CLINIC | Age: 47
End: 2022-03-11
Payer: MEDICARE

## 2022-03-11 VITALS
RESPIRATION RATE: 18 BRPM | SYSTOLIC BLOOD PRESSURE: 102 MMHG | HEART RATE: 87 BPM | WEIGHT: 148 LBS | TEMPERATURE: 97 F | DIASTOLIC BLOOD PRESSURE: 70 MMHG | OXYGEN SATURATION: 98 % | HEIGHT: 66 IN | BODY MASS INDEX: 23.78 KG/M2

## 2022-03-11 DIAGNOSIS — E55.9 VITAMIN D DEFICIENCY: ICD-10-CM

## 2022-03-11 DIAGNOSIS — R09.81 CONGESTION OF NASAL SINUS: ICD-10-CM

## 2022-03-11 DIAGNOSIS — N92.1 MENORRHAGIA WITH IRREGULAR CYCLE: ICD-10-CM

## 2022-03-11 DIAGNOSIS — K21.9 GASTROESOPHAGEAL REFLUX DISEASE, UNSPECIFIED WHETHER ESOPHAGITIS PRESENT: Primary | ICD-10-CM

## 2022-03-11 DIAGNOSIS — Z79.899 HIGH RISK MEDICATION USE: ICD-10-CM

## 2022-03-11 DIAGNOSIS — J10.1 INFLUENZA B: ICD-10-CM

## 2022-03-11 DIAGNOSIS — Z20.822 EXPOSURE TO COVID-19 VIRUS: ICD-10-CM

## 2022-03-11 DIAGNOSIS — E78.5 HYPERLIPIDEMIA, UNSPECIFIED HYPERLIPIDEMIA TYPE: ICD-10-CM

## 2022-03-11 DIAGNOSIS — G40.909 SEIZURE DISORDER: ICD-10-CM

## 2022-03-11 DIAGNOSIS — R52 BODY ACHES: ICD-10-CM

## 2022-03-11 DIAGNOSIS — Z71.89 COMPLEX CARE COORDINATION: ICD-10-CM

## 2022-03-11 LAB
25(OH)D3 SERPL-MCNC: 83.5 NG/ML
CHOLEST SERPL-MCNC: 174 MG/DL (ref 0–200)
CHOLEST/HDLC SERPL: 2.9 {RATIO}
CTP QC/QA: YES
FLUAV AG NPH QL: NEGATIVE
FLUBV AG NPH QL: POSITIVE
HDLC SERPL-MCNC: 60 MG/DL (ref 40–60)
LDLC SERPL CALC-MCNC: 92 MG/DL
LDLC/HDLC SERPL: 1.5 {RATIO}
NONHDLC SERPL-MCNC: 114 MG/DL
SARS-COV-2 AG RESP QL IA.RAPID: NEGATIVE
TRIGL SERPL-MCNC: 112 MG/DL (ref 35–150)
TSH SERPL DL<=0.005 MIU/L-ACNC: 0.73 UIU/ML (ref 0.36–3.74)
VLDLC SERPL-MCNC: 22 MG/DL

## 2022-03-11 PROCEDURE — 99214 OFFICE O/P EST MOD 30 MIN: CPT | Mod: ,,, | Performed by: FAMILY MEDICINE

## 2022-03-11 PROCEDURE — 99214 PR OFFICE/OUTPT VISIT, EST, LEVL IV, 30-39 MIN: ICD-10-PCS | Mod: ,,, | Performed by: FAMILY MEDICINE

## 2022-03-11 PROCEDURE — 82306 VITAMIN D 25 HYDROXY: CPT | Mod: ,,, | Performed by: CLINICAL MEDICAL LABORATORY

## 2022-03-11 PROCEDURE — 80061 LIPID PANEL: ICD-10-PCS | Mod: ,,, | Performed by: CLINICAL MEDICAL LABORATORY

## 2022-03-11 PROCEDURE — 80061 LIPID PANEL: CPT | Mod: ,,, | Performed by: CLINICAL MEDICAL LABORATORY

## 2022-03-11 PROCEDURE — 84443 ASSAY THYROID STIM HORMONE: CPT | Mod: ,,, | Performed by: CLINICAL MEDICAL LABORATORY

## 2022-03-11 PROCEDURE — 84443 TSH: ICD-10-PCS | Mod: ,,, | Performed by: CLINICAL MEDICAL LABORATORY

## 2022-03-11 PROCEDURE — 87428 SARSCOV & INF VIR A&B AG IA: CPT | Mod: RHCUB | Performed by: FAMILY MEDICINE

## 2022-03-11 PROCEDURE — 82306 VITAMIN D: ICD-10-PCS | Mod: ,,, | Performed by: CLINICAL MEDICAL LABORATORY

## 2022-03-11 RX ORDER — OSELTAMIVIR PHOSPHATE 75 MG/1
75 CAPSULE ORAL 2 TIMES DAILY
Qty: 10 CAPSULE | Refills: 0 | Status: SHIPPED | OUTPATIENT
Start: 2022-03-11 | End: 2022-03-16

## 2022-03-11 RX ORDER — OMEPRAZOLE 20 MG/1
20 CAPSULE, DELAYED RELEASE ORAL DAILY
Qty: 30 CAPSULE | Refills: 11 | Status: SHIPPED | OUTPATIENT
Start: 2022-03-11 | End: 2023-01-24 | Stop reason: SDUPTHER

## 2022-03-11 NOTE — PROGRESS NOTES
"Subjective:       Patient ID: Brian Duong is a 46 y.o. female.    Chief Complaint: Sinus Problem, 6 month check up, Nausea, and Epistaxis    47 yo WF here for check up and lab. Complains of feeling bad with some scratchy throat and slight cough. Had vomiting earlier in the week. Having heavy menses for a few months now. Somewhat irregular and heavier for first few days of cycle. Last pap in 2020 with  who is no longer here.     Sinus Problem  Associated symptoms include coughing and a sore throat. Pertinent negatives include no headaches or shortness of breath.   Nausea  Associated symptoms include coughing, nausea, a sore throat and vomiting. Pertinent negatives include no abdominal pain, chest pain or headaches.   Epistaxis       Review of Systems   Constitutional: Negative for activity change.   HENT: Positive for nosebleeds, rhinorrhea and sore throat.    Eyes: Negative for visual disturbance.   Respiratory: Positive for cough. Negative for shortness of breath.    Cardiovascular: Negative for chest pain.   Gastrointestinal: Positive for nausea and vomiting. Negative for abdominal pain.   Genitourinary: Positive for menstrual irregularity and menstrual problem.   Neurological: Negative for headaches.   Psychiatric/Behavioral: Negative for dysphoric mood.         Office Visit on 03/11/2022   Component Date Value Ref Range Status    SARS Coronavirus 2 Antigen 03/11/2022 Negative  Negative Final    Rapid Influenza A Ag 03/11/2022 Negative  Negative Final    Rapid Influenza B Ag 03/11/2022 Positive (A) Negative Final     Acceptable 03/11/2022 Yes   Final       Objective:     Blood pressure 102/70, pulse 87, temperature 97 °F (36.1 °C), temperature source Temporal, resp. rate 18, height 5' 5.8" (1.671 m), weight 67.1 kg (148 lb), SpO2 98 %.      Physical Exam  Constitutional:       Appearance: Normal appearance.   HENT:      Head: Normocephalic and atraumatic.      Right Ear: External ear " normal.      Left Ear: External ear normal.      Nose: Nose normal.      Mouth/Throat:      Mouth: Mucous membranes are moist.   Eyes:      Conjunctiva/sclera: Conjunctivae normal.      Pupils: Pupils are equal, round, and reactive to light.   Cardiovascular:      Rate and Rhythm: Normal rate and regular rhythm.      Pulses: Normal pulses.      Heart sounds: Normal heart sounds.   Pulmonary:      Effort: Pulmonary effort is normal.      Breath sounds: Normal breath sounds.   Abdominal:      General: Abdomen is flat.      Palpations: Abdomen is soft.   Musculoskeletal:         General: Normal range of motion.      Cervical back: Normal range of motion and neck supple.   Skin:     General: Skin is warm and dry.   Neurological:      General: No focal deficit present.      Mental Status: She is alert and oriented to person, place, and time.   Psychiatric:         Mood and Affect: Mood normal.         Behavior: Behavior normal.         Assessment:       Problem List Items Addressed This Visit        Neuro    Seizure disorder    Relevant Orders    CBC Auto Differential    Comprehensive Metabolic Panel    Urinalysis, Reflex to Urine Culture Urine, Clean Catch       Cardiac/Vascular    Hyperlipidemia       Renal/    Menorrhagia with irregular cycle    Relevant Orders    Ambulatory referral/consult to Obstetrics / Gynecology    CBC Auto Differential       Endocrine    Vitamin D deficiency       GI    Gastroesophageal reflux disease - Primary    Relevant Medications    omeprazole (PRILOSEC) 20 MG capsule       Other    High risk medication use    Relevant Orders    CBC Auto Differential    Comprehensive Metabolic Panel    Urinalysis, Reflex to Urine Culture Urine, Clean Catch      Other Visit Diagnoses     Congestion of nasal sinus        Relevant Orders    POCT SARS-COV2 (COVID) with Flu Antigen (Completed)    Body aches        Relevant Orders    POCT SARS-COV2 (COVID) with Flu Antigen (Completed)    Exposure to COVID-19  virus        Relevant Orders    POCT SARS-COV2 (COVID) with Flu Antigen (Completed)    Influenza B        Relevant Medications    oseltamivir (TAMIFLU) 75 MG capsule          Plan:       RTC 6 months with lab.

## 2022-04-04 ENCOUNTER — TELEPHONE (OUTPATIENT)
Dept: FAMILY MEDICINE | Facility: CLINIC | Age: 47
End: 2022-04-04
Payer: MEDICARE

## 2022-04-04 DIAGNOSIS — G40.309 NONINTRACTABLE GENERALIZED IDIOPATHIC EPILEPSY WITHOUT STATUS EPILEPTICUS: Chronic | ICD-10-CM

## 2022-04-04 RX ORDER — LAMOTRIGINE 200 MG/1
TABLET ORAL
Qty: 270 TABLET | Refills: 3 | Status: SHIPPED | OUTPATIENT
Start: 2022-04-04 | End: 2022-04-11 | Stop reason: SDUPTHER

## 2022-04-11 DIAGNOSIS — G40.309 NONINTRACTABLE GENERALIZED IDIOPATHIC EPILEPSY WITHOUT STATUS EPILEPTICUS: Primary | Chronic | ICD-10-CM

## 2022-04-11 RX ORDER — LAMOTRIGINE 200 MG/1
TABLET ORAL
Qty: 270 TABLET | Refills: 3 | Status: SHIPPED | OUTPATIENT
Start: 2022-04-11 | End: 2022-09-29 | Stop reason: SDUPTHER

## 2022-04-13 ENCOUNTER — TELEPHONE (OUTPATIENT)
Dept: FAMILY MEDICINE | Facility: CLINIC | Age: 47
End: 2022-04-13
Payer: MEDICARE

## 2022-04-13 ENCOUNTER — HOSPITAL ENCOUNTER (OUTPATIENT)
Dept: RADIOLOGY | Facility: HOSPITAL | Age: 47
Discharge: HOME | End: 2022-04-13
Payer: MEDICARE

## 2022-04-13 VITALS — WEIGHT: 148 LBS | BODY MASS INDEX: 24.03 KG/M2

## 2022-04-13 DIAGNOSIS — Z12.31 OTHER SCREENING MAMMOGRAM: ICD-10-CM

## 2022-04-13 PROCEDURE — 77067 SCR MAMMO BI INCL CAD: CPT | Mod: TC

## 2022-04-13 NOTE — TELEPHONE ENCOUNTER
Well she may have to take the MOM or the Miralax twice a day for several days in a row to get some results., I guess she could try a Fleets enema as well.Make sure to drink plenty of fluids. ( We could give her a sample of Linzess 290 mcg to take for a few days that might take care of it)

## 2022-04-13 NOTE — TELEPHONE ENCOUNTER
I spoke with pt's mother and she states they will try the otc mom or miralax BID for several days and if not better she will call us and let us know.  They will try OTC meds 1st

## 2022-04-13 NOTE — TELEPHONE ENCOUNTER
"Pt's mother called stated she c/o not having a BM in 2 weeks.  She has tried otc suppositories and MOM and miralax, prune juice, Doculax and has had no results.  I asked her it they had tried magnesium citrate and she said yes.  She said pt said pt said "Had a little " results but she has not seen what the results were. She is eating her normal foods(stated she does not eat healthy) no nausea or vomiting, Is drinking as normal, no hard or distended belly. Denies any discomfort stated she just c/o not being able to go to the bathroom.  "

## 2022-05-31 ENCOUNTER — EXTERNAL CHRONIC CARE MANAGEMENT (OUTPATIENT)
Dept: FAMILY MEDICINE | Facility: CLINIC | Age: 47
End: 2022-05-31
Payer: MEDICARE

## 2022-05-31 PROCEDURE — G0511 CCM/BHI BY RHC/FQHC 20MIN MO: HCPCS | Mod: ,,, | Performed by: FAMILY MEDICINE

## 2022-05-31 PROCEDURE — G0511 PR CHRONIC CARE MGMT, RHC OR FQHC ONLY, 20 MINS OR MORE: ICD-10-PCS | Mod: ,,, | Performed by: FAMILY MEDICINE

## 2022-06-30 ENCOUNTER — EXTERNAL CHRONIC CARE MANAGEMENT (OUTPATIENT)
Dept: FAMILY MEDICINE | Facility: CLINIC | Age: 47
End: 2022-06-30
Payer: MEDICARE

## 2022-06-30 PROCEDURE — G0511 CCM/BHI BY RHC/FQHC 20MIN MO: HCPCS | Mod: ,,, | Performed by: FAMILY MEDICINE

## 2022-06-30 PROCEDURE — G0511 PR CHRONIC CARE MGMT, RHC OR FQHC ONLY, 20 MINS OR MORE: ICD-10-PCS | Mod: ,,, | Performed by: FAMILY MEDICINE

## 2022-07-11 ENCOUNTER — TELEPHONE (OUTPATIENT)
Dept: FAMILY MEDICINE | Facility: CLINIC | Age: 47
End: 2022-07-11
Payer: MEDICARE

## 2022-07-11 NOTE — TELEPHONE ENCOUNTER
Lucia from [facility] called stated pt scored a 20 on her depression screening,.  She is on medication.  Lucia wanted to make you aware. I called to speak with Brian and her step mother stated she is visiting her mother and she seems fine to her.  She said she is always a ,ittle down because she doesn't get out a lot but seems fine to her.  We moved her appointment to Friday for a check up and possible depression.

## 2022-07-31 ENCOUNTER — EXTERNAL CHRONIC CARE MANAGEMENT (OUTPATIENT)
Dept: FAMILY MEDICINE | Facility: CLINIC | Age: 47
End: 2022-07-31
Payer: MEDICARE

## 2022-07-31 PROCEDURE — G0511 PR CHRONIC CARE MGMT, RHC OR FQHC ONLY, 20 MINS OR MORE: ICD-10-PCS | Mod: ,,, | Performed by: FAMILY MEDICINE

## 2022-07-31 PROCEDURE — G0511 CCM/BHI BY RHC/FQHC 20MIN MO: HCPCS | Mod: ,,, | Performed by: FAMILY MEDICINE

## 2022-08-05 ENCOUNTER — HOSPITAL ENCOUNTER (EMERGENCY)
Facility: HOSPITAL | Age: 47
Discharge: HOME | End: 2022-08-05
Payer: MEDICARE

## 2022-08-05 VITALS
HEIGHT: 63 IN | HEART RATE: 74 BPM | OXYGEN SATURATION: 98 % | RESPIRATION RATE: 16 BRPM | WEIGHT: 144 LBS | BODY MASS INDEX: 25.52 KG/M2 | TEMPERATURE: 99 F | SYSTOLIC BLOOD PRESSURE: 116 MMHG | DIASTOLIC BLOOD PRESSURE: 68 MMHG
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
## 2022-08-05 DIAGNOSIS — R07.81 RIB PAIN ON LEFT SIDE: Primary | ICD-10-CM

## 2022-08-05 PROCEDURE — 99284 EMERGENCY DEPT VISIT MOD MDM: CPT

## 2022-08-05 PROCEDURE — 96372 THER/PROPH/DIAG INJ SC/IM: CPT

## 2022-08-05 PROCEDURE — 99283 EMERGENCY DEPT VISIT LOW MDM: CPT | Mod: ,,, | Performed by: NURSE PRACTITIONER

## 2022-08-05 PROCEDURE — 63600175 PHARM REV CODE 636 W HCPCS: Performed by: NURSE PRACTITIONER

## 2022-08-05 PROCEDURE — 99283 PR EMERGENCY DEPT VISIT,LEVEL III: ICD-10-PCS | Mod: ,,, | Performed by: NURSE PRACTITIONER

## 2022-08-05 RX ORDER — KETOROLAC TROMETHAMINE 30 MG/ML
30 INJECTION, SOLUTION INTRAMUSCULAR; INTRAVENOUS
Status: COMPLETED | OUTPATIENT
Start: 2022-08-05 | End: 2022-08-05

## 2022-08-05 RX ADMIN — KETOROLAC TROMETHAMINE 30 MG: 30 INJECTION, SOLUTION INTRAMUSCULAR at 01:08

## 2022-08-05 NOTE — ED PROVIDER NOTES
Encounter Date: 8/5/2022       History     Chief Complaint   Patient presents with    Rib Injury     Pt presents with pain to left side of ribs after a fall in the shower this morning around 8:00. Pt states she has epilepsy but she did not have a seizure this morning.         Review of patient's allergies indicates:  No Known Allergies  Past Medical History:   Diagnosis Date    Aneurysm     Arthritis     Chronic migraine without aura, intractable, without status migrainosus     Epilepsy characterized by intractable complex partial seizures     GERD (gastroesophageal reflux disease)     Headache     Hydrocephalus     Hypertension      Past Surgical History:   Procedure Laterality Date    VAGUS NERVE STIMULATOR INSERTION      VENTRICULOPERITONEAL SHUNT       Family History   Problem Relation Age of Onset    Cancer Mother     Thyroid disease Mother     Hyperlipidemia Father     Breast cancer Maternal Aunt      Social History     Tobacco Use    Smoking status: Never Smoker    Smokeless tobacco: Never Used   Substance Use Topics    Alcohol use: Never    Drug use: Never     Review of Systems   Constitutional: Negative for fever.   HENT: Negative for dental problem and sore throat.    Respiratory: Negative for shortness of breath.    Cardiovascular: Negative for chest pain.   Gastrointestinal: Negative for nausea.   Genitourinary: Negative for dysuria.   Musculoskeletal: Negative for back pain.        Left rib pain   Skin: Negative for rash.   Neurological: Negative for dizziness and weakness.   Hematological: Does not bruise/bleed easily.   Psychiatric/Behavioral: Negative for behavioral problems.       Physical Exam     Initial Vitals [08/05/22 1315]   BP Pulse Resp Temp SpO2   116/68 77 16 98.5 °F (36.9 °C) 98 %      MAP       --         Physical Exam    Nursing note and vitals reviewed.  Constitutional: She appears well-developed.   Cardiovascular: Normal rate and regular rhythm.   Pulmonary/Chest:  Breath sounds normal.   Abdominal: Bowel sounds are normal.       Musculoskeletal:         General: Normal range of motion.     Neurological: She is alert and oriented to person, place, and time.   Psychiatric: She has a normal mood and affect. Thought content normal.         Medical Screening Exam   See Full Note    ED Course   Procedures  Labs Reviewed - No data to display       Imaging Results          X-Ray Ribs 2 View Left (Final result)  Result time 08/05/22 14:04:14    Final result by Edouard Marte DO (08/05/22 14:04:14)                 Impression:      No acute findings      Electronically signed by: Edouard Marte  Date:    08/05/2022  Time:    14:04             Narrative:    EXAMINATION:  XR RIBS 2 VIEW LEFT    CLINICAL HISTORY:  Unspecified fall, initial encounter    TECHNIQUE:  XR RIBS 2 VIEW LEFT    COMPARISON:  10/27/2020 and 2019    FINDINGS:  No acute fracture or dislocation.    Left lung is clear.    Thickened tubing overlies the right chest.                                 Medications   ketorolac injection 30 mg (30 mg Intramuscular Given 8/5/22 1349)                       Clinical Impression:   Final diagnoses:  [W19.XXXA] Fall  [R07.81] Rib pain on left side (Primary)          ED Disposition Condition    Discharge Stable        ED Prescriptions     None        Follow-up Information    None          AMY Morales  08/05/22 7095

## 2022-08-10 ENCOUNTER — OFFICE VISIT (OUTPATIENT)
Dept: NEUROLOGY | Facility: CLINIC | Age: 47
End: 2022-08-10
Payer: MEDICARE

## 2022-08-10 VITALS
BODY MASS INDEX: 25.52 KG/M2 | OXYGEN SATURATION: 97 % | HEIGHT: 63 IN | RESPIRATION RATE: 18 BRPM | WEIGHT: 144 LBS | DIASTOLIC BLOOD PRESSURE: 68 MMHG | HEART RATE: 85 BPM | SYSTOLIC BLOOD PRESSURE: 114 MMHG

## 2022-08-10 DIAGNOSIS — G40.309 NONINTRACTABLE GENERALIZED IDIOPATHIC EPILEPSY WITHOUT STATUS EPILEPTICUS: Primary | Chronic | ICD-10-CM

## 2022-08-10 DIAGNOSIS — F41.9 ANXIETY: Chronic | ICD-10-CM

## 2022-08-10 DIAGNOSIS — F32.A DEPRESSION, UNSPECIFIED DEPRESSION TYPE: ICD-10-CM

## 2022-08-10 DIAGNOSIS — Z79.899 ENCOUNTER FOR LONG-TERM CURRENT USE OF MEDICATION: ICD-10-CM

## 2022-08-10 DIAGNOSIS — G44.229 CHRONIC TENSION-TYPE HEADACHE, NOT INTRACTABLE: ICD-10-CM

## 2022-08-10 DIAGNOSIS — Z45.42 ENCOUNTER FOR FITTING AND ADJUSTMENT OF VAGUS NERVE STIMULATOR: ICD-10-CM

## 2022-08-10 PROCEDURE — 95970 ALYS NPGT W/O PRGRMG: CPT | Mod: PBBFAC | Performed by: NURSE PRACTITIONER

## 2022-08-10 PROCEDURE — 95970 PR ANALYZE NEUROSTIM,NO REPROG: ICD-10-PCS | Mod: S$PBB,,, | Performed by: NURSE PRACTITIONER

## 2022-08-10 PROCEDURE — 99215 OFFICE O/P EST HI 40 MIN: CPT | Mod: PBBFAC | Performed by: NURSE PRACTITIONER

## 2022-08-10 PROCEDURE — 99214 OFFICE O/P EST MOD 30 MIN: CPT | Mod: S$PBB,,, | Performed by: NURSE PRACTITIONER

## 2022-08-10 PROCEDURE — 99214 PR OFFICE/OUTPT VISIT, EST, LEVL IV, 30-39 MIN: ICD-10-PCS | Mod: S$PBB,,, | Performed by: NURSE PRACTITIONER

## 2022-08-10 PROCEDURE — 95970 ALYS NPGT W/O PRGRMG: CPT | Mod: S$PBB,,, | Performed by: NURSE PRACTITIONER

## 2022-08-10 RX ORDER — LEVETIRACETAM 1000 MG/1
1000 TABLET ORAL 2 TIMES DAILY
Qty: 270 TABLET | Refills: 3 | Status: SHIPPED | OUTPATIENT
Start: 2022-08-10 | End: 2022-10-31 | Stop reason: SDUPTHER

## 2022-08-10 NOTE — PROGRESS NOTES
Subjective:       Patient ID: Brian Duong is a 46 y.o. female     Chief Complaint:    Chief Complaint   Patient presents with    Follow-up    Seizures          History of Present Illness  45 y/o WF followed in clinic for history of epilepsy, s/p VNS, hydrocephalus with history of  shunt placement >15 years ago, and headaches.  Family present with her.    Her last clinic visit and evaluation was 11 months ago.    No lamictal level checked since July 2021, it was elevated at that time but was reported as not a true trough level.    No reported grand mal seizures since last visit.    I checked her VNS today, we did not make any changes to her settings.    She has the Lamictal 200mg tablet taking 1.5 tabs BID and the Keppra 1000mg BID as well.    Headaches are reported as very well.  This was greatly improved after we got her started on the Aimovig injections.     She is also on Celexa for depression and denies any worsening in mood.         Review of Systems  Review of Systems   Constitutional: Negative for diaphoresis and fever.   HENT: Negative for congestion, hearing loss and tinnitus.    Eyes: Negative for blurred vision, double vision, photophobia, discharge and redness.   Respiratory: Negative for cough and shortness of breath.    Cardiovascular: Negative for chest pain.   Gastrointestinal: Negative for abdominal pain, nausea and vomiting.   Musculoskeletal: Negative for back pain, joint pain, myalgias and neck pain.   Skin: Negative for itching and rash.   Neurological: Positive for seizures and headaches. Negative for dizziness, tremors, sensory change, speech change, focal weakness, loss of consciousness and weakness.   Psychiatric/Behavioral: Negative for depression, hallucinations and memory loss. The patient does not have insomnia.       Objective:     NEUROLOGICAL EXAMINATION:     MENTAL STATUS   Oriented to person, place, and time.   Registration: recalls 3 of 3 objects. Recall at 5 minutes:  recalls 3 of 3 objects.   Attention: decreased. Concentration: decreased.   Speech: speech is normal   Level of consciousness: arousable by verbal stimuli  Knowledge: good and consistent with education.   Abnormal comprehension.     CRANIAL NERVES     CN II   Visual fields full to confrontation.   Visual acuity: normal  Right visual field deficit: none  Left visual field deficit: none     CN III, IV, VI   Pupils are equal, round, and reactive to light.  Extraocular motions are normal.   Right pupil: Size: 3 mm. Shape: regular. Reactivity: brisk. Consensual response: intact. Accommodation: intact.   Left pupil: Size: 3 mm. Shape: regular. Reactivity: brisk. Consensual response: intact. Accommodation: intact.   CN III: no CN III palsy  CN VI: no CN VI palsy  Nystagmus: none   Diplopia: none  Upgaze: normal  Downgaze: normal  Conjugate gaze: present  Vestibulo-ocular reflex: present    CN V   Facial sensation intact.   Right facial sensation deficit: none  Left facial sensation deficit: none  Right corneal reflex: normal  Left corneal reflex: normal    CN VII   Facial expression full, symmetric.   Right facial weakness: none  Left facial weakness: none  Right taste: normal  Left taste: normal    CN VIII   CN VIII normal.   Hearing: intact    CN IX, X   CN IX normal.   CN X normal.   Palate: symmetric    CN XI   CN XI normal.   Right sternocleidomastoid strength: normal  Left sternocleidomastoid strength: normal  Right trapezius strength: normal  Left trapezius strength: normal    CN XII   CN XII normal.   Tongue: not atrophic  Fasciculations: absent  Tongue deviation: none    MOTOR EXAM   Muscle bulk: normal  Overall muscle tone: normal  Right arm tone: normal  Left arm tone: normal  Right arm pronator drift: absent  Left arm pronator drift: absent  Right leg tone: normal  Left leg tone: normal    Strength   Right neck flexion: 5/5  Left neck flexion: 5/5  Right neck extension: 5/5  Left neck extension: 5/5  Right  deltoid: 5/5  Left deltoid: 5/5  Right biceps: 5/5  Left biceps: 5/5  Right triceps: 5/5  Left triceps: 5/5  Right wrist flexion: 5/5  Left wrist flexion: 5/5  Right wrist extension: 5/5  Left wrist extension: 5/5  Right interossei: 5/5  Left interossei: 5/  Right iliopsoas:   Left iliopsoas:   Right quadriceps:   Left quadriceps:   Right hamstrin/5  Left hamstrin/5  Right anterior tibial: 55  Left anterior tibial:   Right posterior tibial:   Left posterior tibial:   Right gastroc:   Left gastroc: 5    REFLEXES     Reflexes   Right brachioradialis: 2+  Left brachioradialis: 2+  Right biceps: 2+  Left biceps: 2+  Right triceps: 2+  Left triceps: 2+  Right patellar: 2+  Left patellar: 2+  Right achilles: 2+  Left achilles: 2+  Right plantar: normal  Left plantar: normal  Right Hardin: absent  Left Hardin: absent  Right ankle clonus: absent  Left ankle clonus: absent  Right pendular knee jerk: absent  Left pendular knee jerk: absent    SENSORY EXAM   Light touch normal.   Right arm light touch: normal  Left arm light touch: normal  Right leg light touch: normal  Left leg light touch: normal  Vibration normal.   Right arm vibration: normal  Left arm vibration: normal  Right leg vibration: normal  Left leg vibration: normal  Proprioception normal.   Right arm proprioception: normal  Left arm proprioception: normal  Right leg proprioception: normal  Left leg proprioception: normal  Pinprick normal.   Right arm pinprick: normal  Left arm pinprick: normal  Right leg pinprick: normal  Left leg pinprick: normal  Graphesthesia: normal  Romberg: negative  Stereognosis: normal    GAIT AND COORDINATION      Coordination   Finger to nose coordination: normal  Heel to shin coordination: normal  Tandem walking coordination: normal    Tremor   Resting tremor: present  Intention tremor: present  Action tremor: absent       Noted in wheelchair in clinic today        Physical Exam  Vitals and  nursing note reviewed.   Constitutional:       General: She is not in acute distress.     Appearance: She is not ill-appearing.   HENT:      Head: Normocephalic.   Eyes:      Extraocular Movements: EOM normal.      Pupils: Pupils are equal, round, and reactive to light.   Cardiovascular:      Rate and Rhythm: Regular rhythm. Tachycardia present.   Pulmonary:      Effort: Pulmonary effort is normal.      Breath sounds: Normal breath sounds.   Musculoskeletal:         General: No swelling or tenderness. Normal range of motion.      Cervical back: Normal range of motion and neck supple.      Right lower leg: No edema.      Left lower leg: No edema.   Skin:     General: Skin is warm and dry.      Coloration: Skin is not jaundiced.      Findings: No rash.   Neurological:      General: No focal deficit present.      Mental Status: She is oriented to person, place, and time.      GCS: GCS eye subscore is 4. GCS verbal subscore is 5. GCS motor subscore is 6.      Cranial Nerves: No cranial nerve deficit.      Sensory: No sensory deficit.      Motor: No weakness.      Coordination: Coordination abnormal. Finger-Nose-Finger Test, Heel to Shin Test and Romberg Test normal.      Gait: Gait abnormal. Tandem walk normal.      Deep Tendon Reflexes: Reflexes normal.      Reflex Scores:       Tricep reflexes are 2+ on the right side and 2+ on the left side.       Bicep reflexes are 2+ on the right side and 2+ on the left side.       Brachioradialis reflexes are 2+ on the right side and 2+ on the left side.       Patellar reflexes are 2+ on the right side and 2+ on the left side.       Achilles reflexes are 2+ on the right side and 2+ on the left side.  Psychiatric:         Speech: Speech normal.          Assessment:     Problem List Items Addressed This Visit        Neuro    Nonintractable generalized idiopathic epilepsy without status epilepticus - Primary (Chronic)    Relevant Medications    levETIRAcetam (KEPPRA) 1000 MG tablet     Chronic tension-type headache, not intractable (Chronic)    Relevant Medications    erenumab-aooe (AIMOVIG) 140 mg/mL autoinjector    Encounter for fitting and adjustment of vagus nerve stimulator       Psychiatric    Anxiety (Chronic)    Depression      Other Visit Diagnoses     Encounter for long-term current use of medication        Relevant Orders    Comprehensive Metabolic Panel    Lamotrigine Level           Plan:     1. Need labs next visit  2. VNS device evaluated, no changes were made  3. Continue the lamictal and keppra as directed  4. Notify clinic if any further seizures, as will need to obtain labs    Seizure precautions:  1. Take medications as directed  2. Avoid open flames and hot surfaces  3. Avoid elevations  4. Avoid swimming alone  5. Make sure getting plenty of sleep  6. Avoid alcohol  7. No driving or operating heavy machinery for 6 months after last known seizure

## 2022-08-10 NOTE — PATIENT INSTRUCTIONS
1. Continue the current keppra and lamictal as dircted  2. VNS evaluation done, no changes in settings today  3. Continue the celexa 40mg daily  4. Continue the once monthly Aimovig injections as directed      Seizure Precautions:  1. Take medications as directed  2. Avoid open flames and hot surfaces such as fires and grills  3. Avoid elevations such as ladders or stools  4. Avoid swimming alone  5. Make sure getting plenty of sleep, recommend 7-8 hours per day  6. Avoid alcohol and illicit drugs  7. No driving or operating heavy machinery for 6 months after last known seizure

## 2022-08-19 ENCOUNTER — OFFICE VISIT (OUTPATIENT)
Dept: FAMILY MEDICINE | Facility: CLINIC | Age: 47
End: 2022-08-19
Payer: MEDICARE

## 2022-08-19 VITALS
OXYGEN SATURATION: 98 % | BODY MASS INDEX: 24.8 KG/M2 | RESPIRATION RATE: 18 BRPM | WEIGHT: 140 LBS | HEIGHT: 63 IN | DIASTOLIC BLOOD PRESSURE: 70 MMHG | HEART RATE: 71 BPM | TEMPERATURE: 99 F | SYSTOLIC BLOOD PRESSURE: 106 MMHG

## 2022-08-19 DIAGNOSIS — N92.1 MENORRHAGIA WITH IRREGULAR CYCLE: ICD-10-CM

## 2022-08-19 DIAGNOSIS — Z91.81 AT RISK FOR FALLING: Chronic | ICD-10-CM

## 2022-08-19 DIAGNOSIS — F32.89 OTHER DEPRESSION: Primary | Chronic | ICD-10-CM

## 2022-08-19 DIAGNOSIS — G40.909 SEIZURE DISORDER: Chronic | ICD-10-CM

## 2022-08-19 DIAGNOSIS — Z79.899 HIGH RISK MEDICATION USE: ICD-10-CM

## 2022-08-19 LAB
ALBUMIN SERPL BCP-MCNC: 3.3 G/DL (ref 3.5–5)
ALBUMIN/GLOB SERPL: 0.9 {RATIO}
ALP SERPL-CCNC: 133 U/L (ref 39–100)
ALT SERPL W P-5'-P-CCNC: 34 U/L (ref 13–56)
ANION GAP SERPL CALCULATED.3IONS-SCNC: 10 MMOL/L (ref 7–16)
AST SERPL W P-5'-P-CCNC: 23 U/L (ref 15–37)
BASOPHILS # BLD AUTO: 0.06 K/UL (ref 0–0.2)
BASOPHILS NFR BLD AUTO: 1 % (ref 0–1)
BILIRUB SERPL-MCNC: 0.5 MG/DL (ref 0–1.2)
BUN SERPL-MCNC: 10 MG/DL (ref 7–18)
BUN/CREAT SERPL: 14 (ref 6–20)
CALCIUM SERPL-MCNC: 9.3 MG/DL (ref 8.5–10.1)
CHLORIDE SERPL-SCNC: 108 MMOL/L (ref 98–107)
CO2 SERPL-SCNC: 29 MMOL/L (ref 21–32)
CREAT SERPL-MCNC: 0.72 MG/DL (ref 0.55–1.02)
DIFFERENTIAL METHOD BLD: ABNORMAL
EGFR (NO RACE VARIABLE) (RUSH/TITUS): 105 ML/MIN/1.73M²
EOSINOPHIL # BLD AUTO: 0.09 K/UL (ref 0–0.5)
EOSINOPHIL NFR BLD AUTO: 1.5 % (ref 1–4)
ERYTHROCYTE [DISTWIDTH] IN BLOOD BY AUTOMATED COUNT: 11.5 % (ref 11.5–14.5)
GLOBULIN SER-MCNC: 3.5 G/DL (ref 2–4)
GLUCOSE SERPL-MCNC: 94 MG/DL (ref 74–106)
HCT VFR BLD AUTO: 39 % (ref 38–47)
HGB BLD-MCNC: 13.2 G/DL (ref 12–16)
IMM GRANULOCYTES # BLD AUTO: 0.03 K/UL (ref 0–0.04)
IMM GRANULOCYTES NFR BLD: 0.5 % (ref 0–0.4)
LYMPHOCYTES # BLD AUTO: 1.61 K/UL (ref 1–4.8)
LYMPHOCYTES NFR BLD AUTO: 26.5 % (ref 27–41)
MCH RBC QN AUTO: 33.6 PG (ref 27–31)
MCHC RBC AUTO-ENTMCNC: 33.8 G/DL (ref 32–36)
MCV RBC AUTO: 99.2 FL (ref 80–96)
MONOCYTES # BLD AUTO: 0.53 K/UL (ref 0–0.8)
MONOCYTES NFR BLD AUTO: 8.7 % (ref 2–6)
MPC BLD CALC-MCNC: 10.4 FL (ref 9.4–12.4)
NEUTROPHILS # BLD AUTO: 3.76 K/UL (ref 1.8–7.7)
NEUTROPHILS NFR BLD AUTO: 61.8 % (ref 53–65)
NRBC # BLD AUTO: 0 X10E3/UL
NRBC, AUTO (.00): 0 %
PLATELET # BLD AUTO: 398 K/UL (ref 150–400)
POTASSIUM SERPL-SCNC: 4.6 MMOL/L (ref 3.5–5.1)
PROT SERPL-MCNC: 6.8 G/DL (ref 6.4–8.2)
RBC # BLD AUTO: 3.93 M/UL (ref 4.2–5.4)
SODIUM SERPL-SCNC: 142 MMOL/L (ref 136–145)
WBC # BLD AUTO: 6.08 K/UL (ref 4.5–11)

## 2022-08-19 PROCEDURE — 99214 PR OFFICE/OUTPT VISIT, EST, LEVL IV, 30-39 MIN: ICD-10-PCS | Mod: ,,, | Performed by: FAMILY MEDICINE

## 2022-08-19 PROCEDURE — 85025 CBC WITH DIFFERENTIAL: ICD-10-PCS | Mod: ,,, | Performed by: CLINICAL MEDICAL LABORATORY

## 2022-08-19 PROCEDURE — 99214 OFFICE O/P EST MOD 30 MIN: CPT | Mod: ,,, | Performed by: FAMILY MEDICINE

## 2022-08-19 PROCEDURE — 80053 COMPREHEN METABOLIC PANEL: CPT | Mod: ,,, | Performed by: CLINICAL MEDICAL LABORATORY

## 2022-08-19 PROCEDURE — 80053 COMPREHENSIVE METABOLIC PANEL: ICD-10-PCS | Mod: ,,, | Performed by: CLINICAL MEDICAL LABORATORY

## 2022-08-19 PROCEDURE — 85025 COMPLETE CBC W/AUTO DIFF WBC: CPT | Mod: ,,, | Performed by: CLINICAL MEDICAL LABORATORY

## 2022-08-19 NOTE — PROGRESS NOTES
Subjective:       Patient ID: Brian Duong is a 47 y.o. female.    Chief Complaint: Fall (Has had 3 falls, one she feltt light headed, the other 2 she tripped or miss stepped ), Depression, Follwo up on depression (Feels like she is doing a little better with depression), and Weight Loss (Step mother is worried she may be losing weight)    48 yo WF here for recheck. Feels less depressed. Has fallen several times lately, but does not want to do any PT etc for gait and balance. Does not want to see GI for upper scope eval.    Review of Systems   Constitutional: Negative for activity change.   Eyes: Negative for visual disturbance.   Respiratory: Negative for shortness of breath.    Cardiovascular: Negative for chest pain.   Gastrointestinal: Negative for abdominal pain.   Neurological: Negative for headaches.   Psychiatric/Behavioral: Positive for dysphoric mood.         Office Visit on 08/19/2022   Component Date Value Ref Range Status    Sodium 08/19/2022 142  136 - 145 mmol/L Final    Potassium 08/19/2022 4.6  3.5 - 5.1 mmol/L Final    Chloride 08/19/2022 108 (A) 98 - 107 mmol/L Final    CO2 08/19/2022 29  21 - 32 mmol/L Final    Anion Gap 08/19/2022 10  7 - 16 mmol/L Final    Glucose 08/19/2022 94  74 - 106 mg/dL Final    BUN 08/19/2022 10  7 - 18 mg/dL Final    Creatinine 08/19/2022 0.72  0.55 - 1.02 mg/dL Final    BUN/Creatinine Ratio 08/19/2022 14  6 - 20 Final    Calcium 08/19/2022 9.3  8.5 - 10.1 mg/dL Final    Total Protein 08/19/2022 6.8  6.4 - 8.2 g/dL Final    Albumin 08/19/2022 3.3 (A) 3.5 - 5.0 g/dL Final    Globulin 08/19/2022 3.5  2.0 - 4.0 g/dL Final    A/G Ratio 08/19/2022 0.9   Final    Bilirubin, Total 08/19/2022 0.5  0.0 - 1.2 mg/dL Final    Alk Phos 08/19/2022 133 (A) 39 - 100 U/L Final    ALT 08/19/2022 34  13 - 56 U/L Final    AST 08/19/2022 23  15 - 37 U/L Final    eGFR 08/19/2022 105  >=60 mL/min/1.73m² Final    WBC 08/19/2022 6.08  4.50 - 11.00 K/uL Final    RBC  "08/19/2022 3.93 (A) 4.20 - 5.40 M/uL Final    Hemoglobin 08/19/2022 13.2  12.0 - 16.0 g/dL Final    Hematocrit 08/19/2022 39.0  38.0 - 47.0 % Final    MCV 08/19/2022 99.2 (A) 80.0 - 96.0 fL Final    MCH 08/19/2022 33.6 (A) 27.0 - 31.0 pg Final    MCHC 08/19/2022 33.8  32.0 - 36.0 g/dL Final    RDW 08/19/2022 11.5  11.5 - 14.5 % Final    Platelet Count 08/19/2022 398  150 - 400 K/uL Final    MPV 08/19/2022 10.4  9.4 - 12.4 fL Final    Neutrophils % 08/19/2022 61.8  53.0 - 65.0 % Final    Lymphocytes % 08/19/2022 26.5 (A) 27.0 - 41.0 % Final    Monocytes % 08/19/2022 8.7 (A) 2.0 - 6.0 % Final    Eosinophils % 08/19/2022 1.5  1.0 - 4.0 % Final    Basophils % 08/19/2022 1.0  0.0 - 1.0 % Final    Immature Granulocytes % 08/19/2022 0.5 (A) 0.0 - 0.4 % Final    nRBC, Auto 08/19/2022 0.0  <=0.0 % Final    Neutrophils, Abs 08/19/2022 3.76  1.80 - 7.70 K/uL Final    Lymphocytes, Absolute 08/19/2022 1.61  1.00 - 4.80 K/uL Final    Monocytes, Absolute 08/19/2022 0.53  0.00 - 0.80 K/uL Final    Eosinophils, Absolute 08/19/2022 0.09  0.00 - 0.50 K/uL Final    Basophils, Absolute 08/19/2022 0.06  0.00 - 0.20 K/uL Final    Immature Granulocytes, Absolute 08/19/2022 0.03  0.00 - 0.04 K/uL Final    nRBC, Absolute 08/19/2022 0.00  <=0.00 x10e3/uL Final    Diff Type 08/19/2022 Auto   Final       Objective:     Blood pressure 106/70, pulse 71, temperature 98.5 °F (36.9 °C), temperature source Oral, resp. rate 18, height 5' 3" (1.6 m), weight 63.5 kg (140 lb), SpO2 98 %.      Physical Exam  Constitutional:       Appearance: Normal appearance. She is normal weight.   HENT:      Head: Normocephalic and atraumatic.      Nose: Nose normal.      Mouth/Throat:      Mouth: Mucous membranes are moist.   Eyes:      Pupils: Pupils are equal, round, and reactive to light.   Cardiovascular:      Rate and Rhythm: Normal rate and regular rhythm.   Pulmonary:      Effort: Pulmonary effort is normal.   Abdominal:      General: " Abdomen is flat.   Skin:     General: Skin is warm.   Neurological:      Mental Status: She is alert and oriented to person, place, and time. Mental status is at baseline.      Gait: Gait abnormal.      Comments: Stable defects after hydrocephalus at birth   Psychiatric:         Mood and Affect: Mood normal.         Behavior: Behavior normal.         Thought Content: Thought content normal.         Assessment:       Problem List Items Addressed This Visit        Neuro    Seizure disorder       Psychiatric    Depression - Primary       Renal/    Menorrhagia with irregular cycle       Palliative Care    High risk medication use    At risk for falling          Plan:       RTC 6 months for recheck and lab if needed. ( neurology is checking some also) Chronic conditions and medications for each problem have been reviewed. Any changes needed have been made. Routine medications have been reviewed and refilled as needed.

## 2022-08-23 ENCOUNTER — TELEPHONE (OUTPATIENT)
Dept: NEUROLOGY | Facility: CLINIC | Age: 47
End: 2022-08-23
Payer: MEDICARE

## 2022-08-23 PROBLEM — Z91.81 AT RISK FOR FALLING: Status: ACTIVE | Noted: 2022-08-23

## 2022-08-23 NOTE — TELEPHONE ENCOUNTER
Called pt with lab results. She v/u.      ----- Message from AYM Reid sent at 8/23/2022  3:52 PM CDT -----  Labs look good

## 2022-08-31 ENCOUNTER — EXTERNAL CHRONIC CARE MANAGEMENT (OUTPATIENT)
Dept: FAMILY MEDICINE | Facility: CLINIC | Age: 47
End: 2022-08-31
Payer: MEDICARE

## 2022-08-31 PROCEDURE — G0511 PR CHRONIC CARE MGMT, RHC OR FQHC ONLY, 20 MINS OR MORE: ICD-10-PCS | Mod: ,,, | Performed by: FAMILY MEDICINE

## 2022-08-31 PROCEDURE — G0511 CCM/BHI BY RHC/FQHC 20MIN MO: HCPCS | Mod: ,,, | Performed by: FAMILY MEDICINE

## 2022-09-29 DIAGNOSIS — G40.309 NONINTRACTABLE GENERALIZED IDIOPATHIC EPILEPSY WITHOUT STATUS EPILEPTICUS: Chronic | ICD-10-CM

## 2022-09-29 RX ORDER — LAMOTRIGINE 200 MG/1
TABLET ORAL
Qty: 270 TABLET | Refills: 3 | Status: SHIPPED | OUTPATIENT
Start: 2022-09-29 | End: 2023-09-19 | Stop reason: SDUPTHER

## 2022-09-30 ENCOUNTER — EXTERNAL CHRONIC CARE MANAGEMENT (OUTPATIENT)
Dept: FAMILY MEDICINE | Facility: CLINIC | Age: 47
End: 2022-09-30
Payer: MEDICARE

## 2022-09-30 PROCEDURE — G0511 CCM/BHI BY RHC/FQHC 20MIN MO: HCPCS | Mod: ,,, | Performed by: FAMILY MEDICINE

## 2022-09-30 PROCEDURE — G0511 PR CHRONIC CARE MGMT, RHC OR FQHC ONLY, 20 MINS OR MORE: ICD-10-PCS | Mod: ,,, | Performed by: FAMILY MEDICINE

## 2022-10-31 ENCOUNTER — EXTERNAL CHRONIC CARE MANAGEMENT (OUTPATIENT)
Dept: FAMILY MEDICINE | Facility: CLINIC | Age: 47
End: 2022-10-31
Payer: MEDICARE

## 2022-10-31 DIAGNOSIS — G40.309 NONINTRACTABLE GENERALIZED IDIOPATHIC EPILEPSY WITHOUT STATUS EPILEPTICUS: Chronic | ICD-10-CM

## 2022-10-31 PROCEDURE — G0511 PR CHRONIC CARE MGMT, RHC OR FQHC ONLY, 20 MINS OR MORE: ICD-10-PCS | Mod: ,,, | Performed by: FAMILY MEDICINE

## 2022-10-31 PROCEDURE — G0511 CCM/BHI BY RHC/FQHC 20MIN MO: HCPCS | Mod: ,,, | Performed by: FAMILY MEDICINE

## 2022-10-31 RX ORDER — LEVETIRACETAM 1000 MG/1
1000 TABLET ORAL 2 TIMES DAILY
Qty: 270 TABLET | Refills: 3 | Status: SHIPPED | OUTPATIENT
Start: 2022-10-31 | End: 2023-09-19 | Stop reason: SDUPTHER

## 2022-11-10 ENCOUNTER — OFFICE VISIT (OUTPATIENT)
Dept: NEUROLOGY | Facility: CLINIC | Age: 47
End: 2022-11-10
Payer: MEDICARE

## 2022-11-10 VITALS
OXYGEN SATURATION: 96 % | BODY MASS INDEX: 24.63 KG/M2 | WEIGHT: 139 LBS | RESPIRATION RATE: 18 BRPM | HEIGHT: 63 IN | SYSTOLIC BLOOD PRESSURE: 110 MMHG | DIASTOLIC BLOOD PRESSURE: 72 MMHG | HEART RATE: 92 BPM

## 2022-11-10 DIAGNOSIS — F32.89 OTHER DEPRESSION: ICD-10-CM

## 2022-11-10 DIAGNOSIS — G44.229 CHRONIC TENSION-TYPE HEADACHE, NOT INTRACTABLE: Primary | Chronic | ICD-10-CM

## 2022-11-10 DIAGNOSIS — F41.9 ANXIETY: Chronic | ICD-10-CM

## 2022-11-10 DIAGNOSIS — G40.309 NONINTRACTABLE GENERALIZED IDIOPATHIC EPILEPSY WITHOUT STATUS EPILEPTICUS: Chronic | ICD-10-CM

## 2022-11-10 DIAGNOSIS — Z45.42 ENCOUNTER FOR FITTING AND ADJUSTMENT OF VAGUS NERVE STIMULATOR: ICD-10-CM

## 2022-11-10 PROCEDURE — 99212 OFFICE O/P EST SF 10 MIN: CPT | Mod: S$PBB,,, | Performed by: NURSE PRACTITIONER

## 2022-11-10 PROCEDURE — 99215 OFFICE O/P EST HI 40 MIN: CPT | Mod: PBBFAC | Performed by: NURSE PRACTITIONER

## 2022-11-10 PROCEDURE — 99212 PR OFFICE/OUTPT VISIT, EST, LEVL II, 10-19 MIN: ICD-10-PCS | Mod: S$PBB,,, | Performed by: NURSE PRACTITIONER

## 2022-11-10 NOTE — PROGRESS NOTES
Subjective:       Patient ID: Brian Duong is a 47 y.o. female     Chief Complaint:    Chief Complaint   Patient presents with    Follow-up    Seizures            History of Present Illness  46 y/o WF followed in clinic for history of epilepsy, s/p VNS, hydrocephalus with history of  shunt placement >15 years ago, and headaches.  Family present with her.    Last lamictal level was in August 2022, and was WNL at 13    No reported grand mal seizures since last visit.    I checked her VNS today, we did not make any changes to her settings.    She has the Lamictal 200mg tablet taking 1.5 tabs BID and the Keppra 1000mg BID as well.    Headaches are reported as very well.  This was greatly improved after we got her started on the Aimovig injections.     She is also on Celexa for depression and denies any worsening in mood.         Review of Systems  Review of Systems   Constitutional:  Negative for diaphoresis and fever.   HENT:  Negative for congestion, hearing loss and tinnitus.    Eyes:  Negative for blurred vision, double vision, photophobia, discharge and redness.   Respiratory:  Negative for cough and shortness of breath.    Cardiovascular:  Negative for chest pain.   Gastrointestinal:  Negative for abdominal pain, nausea and vomiting.   Musculoskeletal:  Negative for back pain, joint pain, myalgias and neck pain.   Skin:  Negative for itching and rash.   Neurological:  Positive for seizures and headaches. Negative for dizziness, tremors, sensory change, speech change, focal weakness, loss of consciousness and weakness.   Psychiatric/Behavioral:  Positive for depression and memory loss. Negative for hallucinations. The patient does not have insomnia.    All other systems reviewed and are negative.   Objective:     NEUROLOGICAL EXAMINATION:     MENTAL STATUS   Oriented to person, place, and time.   Registration: recalls 3 of 3 objects. Recall at 5 minutes: recalls 3 of 3 objects.   Attention: normal.  Concentration: normal.   Speech: speech is normal   Level of consciousness: alert  Knowledge: good and consistent with education.   Normal comprehension.     CRANIAL NERVES     CN II   Visual fields full to confrontation.   Visual acuity: normal  Right visual field deficit: none  Left visual field deficit: none     CN III, IV, VI   Pupils are equal, round, and reactive to light.  Extraocular motions are normal.   Right pupil: Size: 3 mm. Shape: regular. Reactivity: brisk. Consensual response: intact. Accommodation: intact.   Left pupil: Size: 3 mm. Shape: regular. Reactivity: brisk. Consensual response: intact. Accommodation: intact.   CN III: no CN III palsy  CN VI: no CN VI palsy  Nystagmus: none   Diplopia: none  Upgaze: normal  Downgaze: normal  Conjugate gaze: present  Vestibulo-ocular reflex: present    CN V   Facial sensation intact.   Right facial sensation deficit: none  Left facial sensation deficit: none  Right corneal reflex: normal  Left corneal reflex: normal    CN VII   Facial expression full, symmetric.   Right facial weakness: none  Left facial weakness: none  Right taste: normal  Left taste: normal    CN VIII   CN VIII normal.   Hearing: intact    CN IX, X   CN IX normal.   CN X normal.   Palate: symmetric    CN XI   CN XI normal.   Right sternocleidomastoid strength: normal  Left sternocleidomastoid strength: normal  Right trapezius strength: normal  Left trapezius strength: normal    CN XII   CN XII normal.   Tongue: not atrophic  Fasciculations: absent  Tongue deviation: none    MOTOR EXAM   Muscle bulk: normal  Overall muscle tone: normal  Right arm tone: normal  Left arm tone: increased  Right arm pronator drift: absent  Left arm pronator drift: absent  Right leg tone: normal  Left leg tone: increased    Strength   Right neck flexion: 5/5  Left neck flexion: 5/5  Right neck extension: 5/5  Left neck extension: 5/5  Right deltoid: 5/5  Left deltoid: 4/5  Right biceps: 5/5  Left biceps:  4/5  Right triceps: 5/5  Left triceps: 4/5  Right wrist flexion: 5/5  Left wrist flexion: 4/5  Right wrist extension: 5/5  Left wrist extension: 4/5  Right interossei: 5/5  Left interossei: 4/5  Right iliopsoas: 5/5  Left iliopsoas: 4/5  Right quadriceps: 5/5  Left quadriceps: 4/5  Right hamstrin/5  Left hamstrin/5  Right anterior tibial: 5/5  Left anterior tibial: 4/5  Right posterior tibial: 5/5  Left posterior tibial: 4/5  Right gastroc: 5/5  Left gastroc: 4/5    REFLEXES     Reflexes   Right brachioradialis: 2+  Left brachioradialis: 2+  Right biceps: 2+  Left biceps: 2+  Right triceps: 2+  Left triceps: 2+  Right patellar: 2+  Left patellar: 2+  Right achilles: 2+  Left achilles: 2+  Right plantar: normal  Left plantar: normal  Right Hardin: absent  Left Hardin: absent  Right ankle clonus: absent  Left ankle clonus: absent  Right pendular knee jerk: absent  Left pendular knee jerk: absent    SENSORY EXAM   Light touch normal.   Right arm light touch: normal  Left arm light touch: normal  Right leg light touch: normal  Left leg light touch: normal  Vibration normal.   Right arm vibration: normal  Left arm vibration: normal  Right leg vibration: normal  Left leg vibration: normal  Proprioception normal.   Right arm proprioception: normal  Left arm proprioception: normal  Right leg proprioception: normal  Left leg proprioception: normal  Pinprick normal.   Right arm pinprick: normal  Left arm pinprick: normal  Right leg pinprick: normal  Left leg pinprick: normal  Graphesthesia: normal  Romberg: negative  Stereognosis: normal    GAIT AND COORDINATION      Coordination   Finger to nose coordination: abnormal  Heel to shin coordination: abnormal  Tandem walking coordination: abnormal    Tremor   Resting tremor: absent  Intention tremor: absent  Action tremor: absent     Physical Exam  Vitals and nursing note reviewed.   Constitutional:       Appearance: Normal appearance.   HENT:      Head: Normocephalic.    Eyes:      Extraocular Movements: Extraocular movements intact and EOM normal.      Pupils: Pupils are equal, round, and reactive to light.   Cardiovascular:      Rate and Rhythm: Normal rate and regular rhythm.   Pulmonary:      Effort: Pulmonary effort is normal.      Breath sounds: Normal breath sounds.   Musculoskeletal:         General: No swelling or tenderness. Normal range of motion.      Cervical back: Normal range of motion and neck supple.      Right lower leg: No edema.      Left lower leg: No edema.   Skin:     General: Skin is warm and dry.      Coloration: Skin is not jaundiced.      Findings: No rash.   Neurological:      General: No focal deficit present.      Mental Status: She is alert and oriented to person, place, and time. Mental status is at baseline.      GCS: GCS eye subscore is 4. GCS verbal subscore is 5. GCS motor subscore is 6.      Cranial Nerves: No cranial nerve deficit.      Sensory: No sensory deficit.      Motor: Motor function is intact. No weakness.      Coordination: Coordination abnormal. Finger-Nose-Finger Test abnormal and Heel to Shin Test abnormal. Romberg Test normal.      Gait: Gait abnormal and tandem walk abnormal.      Deep Tendon Reflexes: Reflexes normal.      Reflex Scores:       Tricep reflexes are 2+ on the right side and 2+ on the left side.       Bicep reflexes are 2+ on the right side and 2+ on the left side.       Brachioradialis reflexes are 2+ on the right side and 2+ on the left side.       Patellar reflexes are 2+ on the right side and 2+ on the left side.       Achilles reflexes are 2+ on the right side and 2+ on the left side.  Psychiatric:         Mood and Affect: Mood normal.         Speech: Speech normal.         Behavior: Behavior normal.        Assessment:     Problem List Items Addressed This Visit          Neuro    Nonintractable generalized idiopathic epilepsy without status epilepticus (Chronic)    Chronic tension-type headache, not  intractable - Primary (Chronic)    Encounter for fitting and adjustment of vagus nerve stimulator       Psychiatric    Anxiety (Chronic)    Depression        Plan:     1. Need labs next visit  2. VNS device evaluated, no changes were made  3. Continue the lamictal and keppra as directed  4. Notify clinic if any further seizures, as will need to obtain labs    Seizure precautions:  1. Take medications as directed  2. Avoid open flames and hot surfaces  3. Avoid elevations  4. Avoid swimming alone  5. Make sure getting plenty of sleep  6. Avoid alcohol  7. No driving or operating heavy machinery for 6 months after last known seizure

## 2022-11-30 ENCOUNTER — EXTERNAL CHRONIC CARE MANAGEMENT (OUTPATIENT)
Dept: FAMILY MEDICINE | Facility: CLINIC | Age: 47
End: 2022-11-30
Payer: MEDICARE

## 2022-11-30 PROCEDURE — G0511 CCM/BHI BY RHC/FQHC 20MIN MO: HCPCS | Mod: ,,, | Performed by: FAMILY MEDICINE

## 2022-11-30 PROCEDURE — G0511 PR CHRONIC CARE MGMT, RHC OR FQHC ONLY, 20 MINS OR MORE: ICD-10-PCS | Mod: ,,, | Performed by: FAMILY MEDICINE

## 2022-12-31 ENCOUNTER — EXTERNAL CHRONIC CARE MANAGEMENT (OUTPATIENT)
Dept: FAMILY MEDICINE | Facility: CLINIC | Age: 47
End: 2022-12-31
Payer: MEDICARE

## 2022-12-31 PROCEDURE — G0511 CCM/BHI BY RHC/FQHC 20MIN MO: HCPCS | Mod: ,,, | Performed by: FAMILY MEDICINE

## 2022-12-31 PROCEDURE — G0511 PR CHRONIC CARE MGMT, RHC OR FQHC ONLY, 20 MINS OR MORE: ICD-10-PCS | Mod: ,,, | Performed by: FAMILY MEDICINE

## 2023-01-24 DIAGNOSIS — K21.9 GASTROESOPHAGEAL REFLUX DISEASE, UNSPECIFIED WHETHER ESOPHAGITIS PRESENT: ICD-10-CM

## 2023-01-24 RX ORDER — OMEPRAZOLE 20 MG/1
20 CAPSULE, DELAYED RELEASE ORAL DAILY
Qty: 30 CAPSULE | Refills: 11 | Status: SHIPPED | OUTPATIENT
Start: 2023-01-24 | End: 2024-02-01

## 2023-01-24 RX ORDER — FOLIC ACID 1 MG/1
TABLET ORAL
Qty: 180 TABLET | Refills: 3 | Status: SHIPPED | OUTPATIENT
Start: 2023-01-24

## 2023-01-31 ENCOUNTER — EXTERNAL CHRONIC CARE MANAGEMENT (OUTPATIENT)
Dept: FAMILY MEDICINE | Facility: CLINIC | Age: 48
End: 2023-01-31
Payer: MEDICARE

## 2023-01-31 PROCEDURE — G0511 PR CHRONIC CARE MGMT, RHC OR FQHC ONLY, 20 MINS OR MORE: ICD-10-PCS | Mod: ,,, | Performed by: FAMILY MEDICINE

## 2023-01-31 PROCEDURE — G0511 CCM/BHI BY RHC/FQHC 20MIN MO: HCPCS | Mod: ,,, | Performed by: FAMILY MEDICINE

## 2023-02-09 ENCOUNTER — OFFICE VISIT (OUTPATIENT)
Dept: FAMILY MEDICINE | Facility: CLINIC | Age: 48
End: 2023-02-09
Payer: MEDICARE

## 2023-02-09 VITALS
WEIGHT: 136 LBS | DIASTOLIC BLOOD PRESSURE: 74 MMHG | HEART RATE: 66 BPM | BODY MASS INDEX: 24.1 KG/M2 | OXYGEN SATURATION: 97 % | TEMPERATURE: 99 F | SYSTOLIC BLOOD PRESSURE: 118 MMHG | RESPIRATION RATE: 16 BRPM | HEIGHT: 63 IN

## 2023-02-09 DIAGNOSIS — F33.1 MODERATE EPISODE OF RECURRENT MAJOR DEPRESSIVE DISORDER: Primary | Chronic | ICD-10-CM

## 2023-02-09 DIAGNOSIS — N92.1 MENOMETRORRHAGIA: ICD-10-CM

## 2023-02-09 DIAGNOSIS — F41.9 ANXIETY: Chronic | ICD-10-CM

## 2023-02-09 DIAGNOSIS — G40.309 NONINTRACTABLE GENERALIZED IDIOPATHIC EPILEPSY WITHOUT STATUS EPILEPTICUS: Chronic | ICD-10-CM

## 2023-02-09 DIAGNOSIS — E78.5 HYPERLIPIDEMIA, UNSPECIFIED HYPERLIPIDEMIA TYPE: ICD-10-CM

## 2023-02-09 DIAGNOSIS — R21 RASH AND NONSPECIFIC SKIN ERUPTION: ICD-10-CM

## 2023-02-09 DIAGNOSIS — G91.0 COMMUNICATING HYDROCEPHALUS: Chronic | ICD-10-CM

## 2023-02-09 DIAGNOSIS — Z23 NEED FOR IMMUNIZATION AGAINST INFLUENZA: ICD-10-CM

## 2023-02-09 DIAGNOSIS — E55.9 VITAMIN D DEFICIENCY: ICD-10-CM

## 2023-02-09 PROCEDURE — 90686 FLU VACCINE (QUAD) GREATER THAN OR EQUAL TO 3YO PRESERVATIVE FREE IM: ICD-10-PCS | Mod: ,,, | Performed by: FAMILY MEDICINE

## 2023-02-09 PROCEDURE — G0008 FLU VACCINE (QUAD) GREATER THAN OR EQUAL TO 3YO PRESERVATIVE FREE IM: ICD-10-PCS | Mod: ,,, | Performed by: FAMILY MEDICINE

## 2023-02-09 PROCEDURE — G0008 ADMIN INFLUENZA VIRUS VAC: HCPCS | Mod: ,,, | Performed by: FAMILY MEDICINE

## 2023-02-09 PROCEDURE — 99214 OFFICE O/P EST MOD 30 MIN: CPT | Mod: ,,, | Performed by: FAMILY MEDICINE

## 2023-02-09 PROCEDURE — 90686 IIV4 VACC NO PRSV 0.5 ML IM: CPT | Mod: ,,, | Performed by: FAMILY MEDICINE

## 2023-02-09 PROCEDURE — 99214 PR OFFICE/OUTPT VISIT, EST, LEVL IV, 30-39 MIN: ICD-10-PCS | Mod: ,,, | Performed by: FAMILY MEDICINE

## 2023-02-09 RX ORDER — ARIPIPRAZOLE 5 MG/1
5 TABLET ORAL DAILY
Qty: 30 TABLET | Refills: 11 | Status: SHIPPED | OUTPATIENT
Start: 2023-02-09 | End: 2023-05-10 | Stop reason: SDUPTHER

## 2023-02-09 RX ORDER — MULTIVITAMIN
1 TABLET ORAL DAILY
COMMUNITY
End: 2023-02-09

## 2023-02-09 RX ORDER — OFLOXACIN 3 MG/ML
1 SOLUTION/ DROPS OPHTHALMIC 4 TIMES DAILY
COMMUNITY
Start: 2022-12-27 | End: 2023-02-09

## 2023-02-09 RX ORDER — CITALOPRAM 40 MG/1
40 TABLET, FILM COATED ORAL DAILY
Qty: 90 TABLET | Refills: 3 | Status: SHIPPED | OUTPATIENT
Start: 2023-02-09 | End: 2023-03-20 | Stop reason: SDUPTHER

## 2023-02-10 NOTE — PROGRESS NOTES
"Subjective:       Patient ID: Brian Duong is a 47 y.o. female.    Chief Complaint: 6 month check up, Referral (Rash on arms and legs would like to see Dr. Up ), and Depression (Is not taking Wellbutrin, when I asked if she was taking this ( it was not in her medication bag today) she said all she is taking is what she brought with her today.)    46 yo WF here with complaints of irregular and heavy menses. We made her a GYN apt back last summer and she did not go. Is not sure if she ever took the wellbutrin, but is still depressed on the celexa.     DepressionPatient is not experiencing: shortness of breath.      Review of Systems   Constitutional:  Negative for activity change.   Eyes:  Negative for visual disturbance.   Respiratory:  Negative for shortness of breath.    Cardiovascular:  Negative for chest pain.   Gastrointestinal:  Negative for abdominal pain.   Genitourinary:  Positive for menstrual irregularity and menstrual problem.   Integumentary:  Positive for rash.   Neurological:  Negative for headaches.   Psychiatric/Behavioral:  Positive for depression and dysphoric mood.            Objective:    Blood pressure 118/74, pulse 66, temperature 98.6 °F (37 °C), temperature source Oral, resp. rate 16, height 5' 3" (1.6 m), weight 61.7 kg (136 lb), last menstrual period 02/05/2023, SpO2 97 %.    Physical Exam  Constitutional:       Appearance: Normal appearance.   HENT:      Head: Normocephalic and atraumatic.      Nose: Nose normal.      Mouth/Throat:      Mouth: Mucous membranes are moist.   Eyes:      Pupils: Pupils are equal, round, and reactive to light.   Cardiovascular:      Rate and Rhythm: Normal rate and regular rhythm.      Pulses: Normal pulses.      Heart sounds: Normal heart sounds.   Pulmonary:      Effort: Pulmonary effort is normal.      Breath sounds: Normal breath sounds.   Abdominal:      General: Abdomen is flat.   Skin:     General: Skin is warm and dry.   Neurological:      Mental " Status: She is alert and oriented to person, place, and time. Mental status is at baseline.   Psychiatric:         Mood and Affect: Mood normal.         Behavior: Behavior normal.       Assessment:       Problem List Items Addressed This Visit          Neuro    Nonintractable generalized idiopathic epilepsy without status epilepticus (Chronic)    Communicating hydrocephalus       Psychiatric    Anxiety (Chronic)    Relevant Medications    citalopram (CELEXA) 40 MG tablet    Moderate episode of recurrent major depressive disorder - Primary    Relevant Medications    ARIPiprazole (ABILIFY) 5 MG Tab    citalopram (CELEXA) 40 MG tablet       Cardiac/Vascular    Hyperlipidemia    Relevant Orders    Lipid Panel    TSH       Endocrine    Vitamin D deficiency    Relevant Orders    Vitamin D     Other Visit Diagnoses       Need for immunization against influenza        Relevant Orders    Influenza - Quadrivalent (PF) (Completed)    Menometrorrhagia        Relevant Orders    Ambulatory referral/consult to Obstetrics / Gynecology    Rash and nonspecific skin eruption        Relevant Orders    Ambulatory referral/consult to Dermatology              Plan:       RTC 1 months to recheck after starting the abilify and do some fasting lab.

## 2023-02-28 ENCOUNTER — EXTERNAL CHRONIC CARE MANAGEMENT (OUTPATIENT)
Dept: FAMILY MEDICINE | Facility: CLINIC | Age: 48
End: 2023-02-28
Payer: MEDICARE

## 2023-02-28 PROCEDURE — G0511 CCM/BHI BY RHC/FQHC 20MIN MO: HCPCS | Mod: ,,, | Performed by: FAMILY MEDICINE

## 2023-02-28 PROCEDURE — G0511 PR CHRONIC CARE MGMT, RHC OR FQHC ONLY, 20 MINS OR MORE: ICD-10-PCS | Mod: ,,, | Performed by: FAMILY MEDICINE

## 2023-03-16 ENCOUNTER — OFFICE VISIT (OUTPATIENT)
Dept: NEUROLOGY | Facility: CLINIC | Age: 48
End: 2023-03-16
Payer: MEDICARE

## 2023-03-16 VITALS
OXYGEN SATURATION: 99 % | WEIGHT: 136 LBS | BODY MASS INDEX: 24.1 KG/M2 | DIASTOLIC BLOOD PRESSURE: 68 MMHG | SYSTOLIC BLOOD PRESSURE: 114 MMHG | HEART RATE: 79 BPM | RESPIRATION RATE: 18 BRPM | HEIGHT: 63 IN

## 2023-03-16 DIAGNOSIS — F41.9 ANXIETY: ICD-10-CM

## 2023-03-16 DIAGNOSIS — F32.89 OTHER DEPRESSION: ICD-10-CM

## 2023-03-16 DIAGNOSIS — Z45.42 ENCOUNTER FOR FITTING AND ADJUSTMENT OF VAGUS NERVE STIMULATOR: ICD-10-CM

## 2023-03-16 DIAGNOSIS — G40.309 NONINTRACTABLE GENERALIZED IDIOPATHIC EPILEPSY WITHOUT STATUS EPILEPTICUS: Primary | ICD-10-CM

## 2023-03-16 DIAGNOSIS — G44.229 CHRONIC TENSION-TYPE HEADACHE, NOT INTRACTABLE: ICD-10-CM

## 2023-03-16 PROCEDURE — 95970 ALYS NPGT W/O PRGRMG: CPT | Mod: S$PBB,,, | Performed by: NURSE PRACTITIONER

## 2023-03-16 PROCEDURE — 99215 OFFICE O/P EST HI 40 MIN: CPT | Mod: 25,PBBFAC | Performed by: NURSE PRACTITIONER

## 2023-03-16 PROCEDURE — 99213 OFFICE O/P EST LOW 20 MIN: CPT | Mod: 25,S$PBB,, | Performed by: NURSE PRACTITIONER

## 2023-03-16 PROCEDURE — 95970 PR ANALYZE NEUROSTIM,NO REPROG: ICD-10-PCS | Mod: S$PBB,,, | Performed by: NURSE PRACTITIONER

## 2023-03-16 PROCEDURE — 95970 ALYS NPGT W/O PRGRMG: CPT | Mod: PBBFAC | Performed by: NURSE PRACTITIONER

## 2023-03-16 PROCEDURE — 99213 PR OFFICE/OUTPT VISIT, EST, LEVL III, 20-29 MIN: ICD-10-PCS | Mod: 25,S$PBB,, | Performed by: NURSE PRACTITIONER

## 2023-03-16 RX ORDER — MUPIROCIN 20 MG/G
OINTMENT TOPICAL
COMMUNITY
Start: 2023-02-16

## 2023-03-16 RX ORDER — TRIAMCINOLONE ACETONIDE 1 MG/G
OINTMENT TOPICAL
COMMUNITY
Start: 2023-02-16

## 2023-03-16 NOTE — PROGRESS NOTES
Subjective:       Patient ID: Brian Duong is a 47 y.o. female     Chief Complaint:    Chief Complaint   Patient presents with    Follow-up            History of Present Illness  46 y/o WF followed in clinic for history of epilepsy, s/p VNS, hydrocephalus with history of  shunt placement >15 years ago, and headaches.  Family present with her.    Last lamictal level was in August 2022, and was WNL at 13    There is report of a seizure about 6 weeks ago per the family.  That is the first one in the last 8-9 months.  Denies having missed any dosing of medication around that time or illness.  No further incidents since then.    I checked her VNS today, we did not make any changes to her settings.    She has the Lamictal 200mg tablet taking 1.5 tabs BID and the Keppra 1000mg BID as well.    Headaches are reported as very well.  This was greatly improved after we got her started on the Aimovig injections.     She is also on Celexa for depression and denies any worsening in mood to me, but her primary care notes last month indicated she was still reporting feeling depressed.         Review of Systems  Review of Systems   Constitutional:  Negative for diaphoresis and fever.   HENT:  Negative for congestion, hearing loss and tinnitus.    Eyes:  Negative for blurred vision, double vision, photophobia, discharge and redness.   Respiratory:  Negative for cough and shortness of breath.    Cardiovascular:  Negative for chest pain.   Gastrointestinal:  Negative for abdominal pain, nausea and vomiting.   Musculoskeletal:  Negative for back pain, joint pain, myalgias and neck pain.   Skin:  Negative for itching and rash.   Neurological:  Positive for seizures and headaches. Negative for dizziness, tremors, sensory change, speech change, focal weakness, loss of consciousness and weakness.   Psychiatric/Behavioral:  Positive for depression and memory loss. Negative for hallucinations. The patient does not have insomnia.    All  other systems reviewed and are negative.   Objective:     NEUROLOGICAL EXAMINATION:     MENTAL STATUS   Oriented to person, place, and time.   Registration: recalls 3 of 3 objects. Recall at 5 minutes: recalls 3 of 3 objects.   Attention: normal. Concentration: normal.   Speech: speech is normal   Level of consciousness: alert  Knowledge: good and consistent with education.   Normal comprehension.     CRANIAL NERVES     CN II   Visual fields full to confrontation.   Visual acuity: normal  Right visual field deficit: none  Left visual field deficit: none     CN III, IV, VI   Pupils are equal, round, and reactive to light.  Extraocular motions are normal.   Right pupil: Size: 3 mm. Shape: regular. Reactivity: brisk. Consensual response: intact. Accommodation: intact.   Left pupil: Size: 3 mm. Shape: regular. Reactivity: brisk. Consensual response: intact. Accommodation: intact.   CN III: no CN III palsy  CN VI: no CN VI palsy  Nystagmus: none   Diplopia: none  Upgaze: normal  Downgaze: normal  Conjugate gaze: present  Vestibulo-ocular reflex: present    CN V   Facial sensation intact.   Right facial sensation deficit: none  Left facial sensation deficit: none  Right corneal reflex: normal  Left corneal reflex: normal    CN VII   Facial expression full, symmetric.   Right facial weakness: none  Left facial weakness: none  Right taste: normal  Left taste: normal    CN VIII   CN VIII normal.   Hearing: intact    CN IX, X   CN IX normal.   CN X normal.   Palate: symmetric    CN XI   CN XI normal.   Right sternocleidomastoid strength: normal  Left sternocleidomastoid strength: normal  Right trapezius strength: normal  Left trapezius strength: normal    CN XII   CN XII normal.   Tongue: not atrophic  Fasciculations: absent  Tongue deviation: none    MOTOR EXAM   Muscle bulk: normal  Overall muscle tone: normal  Right arm tone: normal  Left arm tone: increased  Right arm pronator drift: absent  Left arm pronator drift:  absent  Right leg tone: normal  Left leg tone: increased    Strength   Right neck flexion: 5/5  Left neck flexion: 5/5  Right neck extension: 5/5  Left neck extension: 5/5  Right deltoid: 5/5  Left deltoid: 4/5  Right biceps: 5/5  Left biceps: 4/5  Right triceps: 5/5  Left triceps: 4/5  Right wrist flexion: 5/5  Left wrist flexion: 4/5  Right wrist extension: 5/5  Left wrist extension: 4/5  Right interossei: 5/5  Left interossei: 4/5  Right iliopsoas: 5/5  Left iliopsoas: 4/5  Right quadriceps: 5/5  Left quadriceps: 4/5  Right hamstrin/5  Left hamstrin/5  Right anterior tibial: 5/5  Left anterior tibial: 4/5  Right posterior tibial: 5/5  Left posterior tibial: 4/5  Right gastroc: 5/5  Left gastroc: 4/5    REFLEXES     Reflexes   Right brachioradialis: 2+  Left brachioradialis: 2+  Right biceps: 2+  Left biceps: 2+  Right triceps: 2+  Left triceps: 2+  Right patellar: 2+  Left patellar: 2+  Right achilles: 2+  Left achilles: 2+  Right plantar: normal  Left plantar: normal  Right Hardin: absent  Left Hardin: absent  Right ankle clonus: absent  Left ankle clonus: absent  Right pendular knee jerk: absent  Left pendular knee jerk: absent    SENSORY EXAM   Light touch normal.   Right arm light touch: normal  Left arm light touch: normal  Right leg light touch: normal  Left leg light touch: normal  Vibration normal.   Right arm vibration: normal  Left arm vibration: normal  Right leg vibration: normal  Left leg vibration: normal  Proprioception normal.   Right arm proprioception: normal  Left arm proprioception: normal  Right leg proprioception: normal  Left leg proprioception: normal  Pinprick normal.   Right arm pinprick: normal  Left arm pinprick: normal  Right leg pinprick: normal  Left leg pinprick: normal  Graphesthesia: normal  Romberg: negative  Stereognosis: normal    GAIT AND COORDINATION      Coordination   Finger to nose coordination: abnormal  Heel to shin coordination: abnormal  Tandem walking  coordination: abnormal    Tremor   Resting tremor: absent  Intention tremor: absent  Action tremor: absent     Physical Exam  Vitals and nursing note reviewed.   Constitutional:       Appearance: Normal appearance.   HENT:      Head: Normocephalic.   Eyes:      Extraocular Movements: Extraocular movements intact and EOM normal.      Pupils: Pupils are equal, round, and reactive to light.   Cardiovascular:      Rate and Rhythm: Normal rate and regular rhythm.   Pulmonary:      Effort: Pulmonary effort is normal.      Breath sounds: Normal breath sounds.   Musculoskeletal:         General: No swelling or tenderness. Normal range of motion.      Cervical back: Normal range of motion and neck supple.      Right lower leg: No edema.      Left lower leg: No edema.   Skin:     General: Skin is warm and dry.      Coloration: Skin is not jaundiced.      Findings: No rash.   Neurological:      General: No focal deficit present.      Mental Status: She is alert and oriented to person, place, and time. Mental status is at baseline.      GCS: GCS eye subscore is 4. GCS verbal subscore is 5. GCS motor subscore is 6.      Cranial Nerves: No cranial nerve deficit.      Sensory: No sensory deficit.      Motor: Motor function is intact. No weakness.      Coordination: Coordination abnormal. Finger-Nose-Finger Test abnormal and Heel to Shin Test abnormal. Romberg Test normal.      Gait: Gait abnormal and tandem walk abnormal.      Deep Tendon Reflexes: Reflexes normal.      Reflex Scores:       Tricep reflexes are 2+ on the right side and 2+ on the left side.       Bicep reflexes are 2+ on the right side and 2+ on the left side.       Brachioradialis reflexes are 2+ on the right side and 2+ on the left side.       Patellar reflexes are 2+ on the right side and 2+ on the left side.       Achilles reflexes are 2+ on the right side and 2+ on the left side.  Psychiatric:         Mood and Affect: Mood normal.         Speech: Speech normal.          Behavior: Behavior normal.        Assessment:     Problem List Items Addressed This Visit          Neuro    Nonintractable generalized idiopathic epilepsy without status epilepticus - Primary (Chronic)    Chronic tension-type headache, not intractable (Chronic)    Encounter for fitting and adjustment of vagus nerve stimulator       Psychiatric    Anxiety (Chronic)    Depression          Plan:     1. Need labs next visit  2. VNS device evaluated, no changes were made  3. Continue the lamictal and keppra as directed  4. Notify clinic if any further seizures, as will need to obtain labs    Seizure precautions:  1. Take medications as directed  2. Avoid open flames and hot surfaces  3. Avoid elevations  4. Avoid swimming alone  5. Make sure getting plenty of sleep  6. Avoid alcohol  7. No driving or operating heavy machinery for 6 months after last known seizure

## 2023-03-20 DIAGNOSIS — F33.1 MODERATE EPISODE OF RECURRENT MAJOR DEPRESSIVE DISORDER: Chronic | ICD-10-CM

## 2023-03-20 DIAGNOSIS — F41.9 ANXIETY: Chronic | ICD-10-CM

## 2023-03-20 RX ORDER — CITALOPRAM 40 MG/1
40 TABLET, FILM COATED ORAL DAILY
Qty: 90 TABLET | Refills: 3 | Status: SHIPPED | OUTPATIENT
Start: 2023-03-20 | End: 2024-03-04 | Stop reason: SDUPTHER

## 2023-03-30 ENCOUNTER — OFFICE VISIT (OUTPATIENT)
Dept: OBSTETRICS AND GYNECOLOGY | Facility: CLINIC | Age: 48
End: 2023-03-30
Payer: MEDICARE

## 2023-03-30 VITALS
RESPIRATION RATE: 17 BRPM | TEMPERATURE: 98 F | OXYGEN SATURATION: 95 % | BODY MASS INDEX: 25.09 KG/M2 | WEIGHT: 141.63 LBS | HEART RATE: 84 BPM | SYSTOLIC BLOOD PRESSURE: 129 MMHG | HEIGHT: 63 IN | DIASTOLIC BLOOD PRESSURE: 65 MMHG

## 2023-03-30 DIAGNOSIS — N92.1 MENOMETRORRHAGIA: ICD-10-CM

## 2023-03-30 DIAGNOSIS — E83.19 OTHER DISORDERS OF IRON METABOLISM: ICD-10-CM

## 2023-03-30 DIAGNOSIS — N91.5 OLIGOMENORRHEA, UNSPECIFIED: ICD-10-CM

## 2023-03-30 PROCEDURE — 99214 PR OFFICE/OUTPT VISIT, EST, LEVL IV, 30-39 MIN: ICD-10-PCS | Mod: S$PBB,,, | Performed by: STUDENT IN AN ORGANIZED HEALTH CARE EDUCATION/TRAINING PROGRAM

## 2023-03-30 PROCEDURE — 99214 OFFICE O/P EST MOD 30 MIN: CPT | Mod: S$PBB,,, | Performed by: STUDENT IN AN ORGANIZED HEALTH CARE EDUCATION/TRAINING PROGRAM

## 2023-03-30 PROCEDURE — 99215 OFFICE O/P EST HI 40 MIN: CPT | Mod: PBBFAC | Performed by: STUDENT IN AN ORGANIZED HEALTH CARE EDUCATION/TRAINING PROGRAM

## 2023-03-30 NOTE — PROGRESS NOTES
Gynecology History and Physical    Assessment/Plan:   Problem List Items Addressed This Visit    None  Visit Diagnoses       Menometrorrhagia        Relevant Orders    US Pelvis Complete Non OB    Hemoglobin A1C (Completed)    TSH (Completed)    Prolactin (Completed)    DHEA-Sulfate (Completed)    CBC Auto Differential (Completed)    Luteinizing Hormone (Completed)    Follicle Stimulating Hormone (Completed)    Testosterone, Total and Free, S (Completed)    17-Hydroxyprogesterone    Estrogens, fractionated (Completed)    Other disorders of iron metabolism        Relevant Orders    Hemoglobin A1C (Completed)    Oligomenorrhea, unspecified        Relevant Orders    TSH (Completed)              CC:   Chief Complaint   Patient presents with    Irregular cycles      HPI: Brian Duong is a 47 y.o. female who presents with complaints of abnormal uterine bleeding.  Reports bleeding can last all month long. Began several months ago. Last Pap 3 years ago.    Review of Systems: The following ROS was otherwise negative, except as noted in the HPI:  constitutional, HEENT, respiratory, cardiovascular, gastrointestinal, genitourinary, skin, musculoskeletal, neurological, psych    Gynecologic History:   denies history of abnormal pap smears  denies history of of STIs  Menstrual history:       Obstetrical History:  OB History    No obstetric history on file.         Past Medical History:   Past Medical History:   Diagnosis Date    Aneurysm     Arthritis     Chronic migraine without aura, intractable, without status migrainosus     Epilepsy characterized by intractable complex partial seizures     GERD (gastroesophageal reflux disease)     Headache     Hydrocephalus     Hypertension        Medications:  Medication List with Changes/Refills   Current Medications    ACETAMINOPHEN (TYLENOL) 650 MG TBSR    Take 650 mg by mouth 2 (two) times daily.    ARIPIPRAZOLE (ABILIFY) 5 MG TAB    Take 1 tablet (5 mg total) by mouth once daily.     CITALOPRAM (CELEXA) 40 MG TABLET    Take 1 tablet (40 mg total) by mouth once daily.    CYANOCOBALAMIN (VITAMIN B-12) 1000 MCG TABLET    Take 100 mcg by mouth once daily.    DOCUSATE SODIUM (COLACE) 100 MG CAPSULE    Take 100 mg by mouth 2 (two) times daily.    ERENUMAB-AOOE (AIMOVIG) 140 MG/ML AUTOINJECTOR    Inject 1 mL (140 mg total) into the skin every 28 days.    FOLIC ACID (FOLVITE) 1 MG TABLET    TAKE 2 TABLETS(2000 MCG) BY MOUTH EVERY EVENING Strength: 1 mg    FOLIC ACID/MULTIVIT-MIN/LUTEIN (ADULT MULTIVITAMIN, W-LUTEIN, ORAL)    Take by mouth.    LAMOTRIGINE (LAMICTAL) 200 MG TABLET    1.5 tab by mouth twice daily    LEVETIRACETAM (KEPPRA) 1000 MG TABLET    Take 1 tablet (1,000 mg total) by mouth 2 (two) times daily.    MECLIZINE (ANTIVERT) 25 MG TABLET    Take 1 tablet (25 mg total) by mouth 3 (three) times daily as needed for Dizziness.    METHOCARBAMOL (ROBAXIN) 500 MG TAB    Take 500 mg by mouth 4 (four) times daily as needed. 1 or 2 tabs as needed    MUPIROCIN (BACTROBAN) 2 % OINTMENT    APPLY TO ARM DAILY    OMEPRAZOLE (PRILOSEC) 20 MG CAPSULE    Take 1 capsule (20 mg total) by mouth once daily.    TRIAMCINOLONE ACETONIDE 0.1% (KENALOG) 0.1 % OINTMENT    APPLY TOPICALLY TO ARMS TWICE DAILY FOR 2 WEEKS. AVOID FACE    VITAMIN D (VITAMIN D3) 1000 UNITS TAB    Take 1,000 Units by mouth once daily.       Allergies:  Patient has no known allergies.    Surgical History:  Past Surgical History:   Procedure Laterality Date    VAGUS NERVE STIMULATOR INSERTION      VENTRICULOPERITONEAL SHUNT         Family History:  Family History   Problem Relation Age of Onset    Cancer Mother     Thyroid disease Mother     Hyperlipidemia Father     Breast cancer Maternal Aunt        Social History:  Social History     Substance and Sexual Activity   Alcohol Use Never     Social History     Substance and Sexual Activity   Drug Use Never     Social History     Tobacco Use   Smoking Status Never    Passive exposure: Never  "  Smokeless Tobacco Never       Physical Exam:  /65 (BP Location: Left arm, Patient Position: Sitting)   Pulse 84   Temp 98.3 °F (36.8 °C) (Oral)   Resp 17   Ht 5' 3" (1.6 m)   Wt 64.2 kg (141 lb 9.6 oz)   SpO2 95%   BMI 25.08 kg/m²     General: Alert, well appearing, no acute distress  Head: Normocephalic, atraumatic  Lungs: Unlabored respirations  Abdomen: Soft, nontender, nondistended   Extremities: No redness or tenderness  Skin: Well perfused, normal coloration and turgor, no lesions or rashes visualized  Neuro: Alert, oriented, normal speech, no focal deficits, moves extremities appropriately  Osteopathic: No TART changes    Labs:  Lab Visit on 03/30/2023   Component Date Value    Hemoglobin A1C 03/30/2023 4.8     Estimated Average Glucose 03/30/2023 74     TSH 03/30/2023 0.686     Prolactin, Blood 03/30/2023 20.70     DHEA Sulfate 03/30/2023 60.6     LH, Blood 03/30/2023 2.5     FSH 03/30/2023 3.2     Testosterone, Free 03/30/2023 <0.13     Testosterone, Total 03/30/2023 <7.0 (L)     Estradiol, Mass Spectrom* 03/30/2023 132     Estrone 03/30/2023 75     WBC 03/30/2023 6.41     RBC 03/30/2023 3.80 (L)     Hemoglobin 03/30/2023 12.6     Hematocrit 03/30/2023 39.0     MCV 03/30/2023 102.6 (H)     MCH 03/30/2023 33.2 (H)     MCHC 03/30/2023 32.3     RDW 03/30/2023 11.6     Platelet Count 03/30/2023 301     MPV 03/30/2023 9.0 (L)     Neutrophils % 03/30/2023 63.7     Lymphocytes % 03/30/2023 25.1 (L)     Monocytes % 03/30/2023 10.0 (H)     Eosinophils % 03/30/2023 0.5 (L)     Basophils % 03/30/2023 0.5     Immature Granulocytes % 03/30/2023 0.2     nRBC, Auto 03/30/2023 0.0     Neutrophils, Abs 03/30/2023 4.09     Lymphocytes, Absolute 03/30/2023 1.61     Monocytes, Absolute 03/30/2023 0.64     Eosinophils, Absolute 03/30/2023 0.03     Basophils, Absolute 03/30/2023 0.03     Immature Granulocytes, A* 03/30/2023 0.01     nRBC, Absolute 03/30/2023 0.00     Diff Type 03/30/2023 Auto       Plan:  Labs " today.  Follow-up in 2-3 weeks for US, Pap, & EMB

## 2023-03-31 ENCOUNTER — EXTERNAL CHRONIC CARE MANAGEMENT (OUTPATIENT)
Dept: FAMILY MEDICINE | Facility: CLINIC | Age: 48
End: 2023-03-31
Payer: MEDICARE

## 2023-03-31 PROCEDURE — G0511 PR CHRONIC CARE MGMT, RHC OR FQHC ONLY, 20 MINS OR MORE: ICD-10-PCS | Mod: ,,, | Performed by: FAMILY MEDICINE

## 2023-03-31 PROCEDURE — G0511 CCM/BHI BY RHC/FQHC 20MIN MO: HCPCS | Mod: ,,, | Performed by: FAMILY MEDICINE

## 2023-04-04 DIAGNOSIS — N92.1 MENOMETRORRHAGIA: ICD-10-CM

## 2023-04-18 ENCOUNTER — PROCEDURE VISIT (OUTPATIENT)
Dept: OBSTETRICS AND GYNECOLOGY | Facility: CLINIC | Age: 48
End: 2023-04-18
Payer: MEDICARE

## 2023-04-18 ENCOUNTER — HOSPITAL ENCOUNTER (OUTPATIENT)
Dept: RADIOLOGY | Facility: HOSPITAL | Age: 48
Discharge: HOME | End: 2023-04-18
Attending: STUDENT IN AN ORGANIZED HEALTH CARE EDUCATION/TRAINING PROGRAM
Payer: MEDICARE

## 2023-04-18 VITALS
DIASTOLIC BLOOD PRESSURE: 66 MMHG | BODY MASS INDEX: 24.7 KG/M2 | HEART RATE: 84 BPM | WEIGHT: 139.38 LBS | RESPIRATION RATE: 17 BRPM | SYSTOLIC BLOOD PRESSURE: 121 MMHG | HEIGHT: 63 IN | TEMPERATURE: 99 F | OXYGEN SATURATION: 97 %

## 2023-04-18 DIAGNOSIS — N92.1 MENOMETRORRHAGIA: Primary | ICD-10-CM

## 2023-04-18 DIAGNOSIS — N94.89 OTHER SPECIFIED CONDITIONS ASSOCIATED WITH FEMALE GENITAL ORGANS AND MENSTRUAL CYCLE: ICD-10-CM

## 2023-04-18 DIAGNOSIS — N92.1 MENOMETRORRHAGIA: ICD-10-CM

## 2023-04-18 PROCEDURE — 76856 US PELVIS COMPLETE NON OB: ICD-10-PCS | Mod: 26,,, | Performed by: RADIOLOGY

## 2023-04-18 PROCEDURE — 99213 OFFICE O/P EST LOW 20 MIN: CPT | Mod: S$PBB,,, | Performed by: STUDENT IN AN ORGANIZED HEALTH CARE EDUCATION/TRAINING PROGRAM

## 2023-04-18 PROCEDURE — 76856 US EXAM PELVIC COMPLETE: CPT | Mod: TC

## 2023-04-18 PROCEDURE — 76856 US EXAM PELVIC COMPLETE: CPT | Mod: 26,,, | Performed by: RADIOLOGY

## 2023-04-18 PROCEDURE — 99213 PR OFFICE/OUTPT VISIT, EST, LEVL III, 20-29 MIN: ICD-10-PCS | Mod: S$PBB,,, | Performed by: STUDENT IN AN ORGANIZED HEALTH CARE EDUCATION/TRAINING PROGRAM

## 2023-04-18 NOTE — PROGRESS NOTES
"  Return Gyn Office Visit    Assessment/Plan  Problem List Items Addressed This Visit    None  Visit Diagnoses       Menometrorrhagia        Relevant Orders    POCT urine pregnancy    hCG, Total, Quantitative    Other specified conditions associated with female genital organs and menstrual cycle        Relevant Orders    hCG, Total, Quantitative              CC:   Chief Complaint   Patient presents with    EMB      HPI:  47 y.o. who presents to office for US and results discussion. Unable to void for UPT. Attempted straight cath urine sample, unable to pass catheter due to resistance.     Reviewed US results with patient and family member. Will reschedule EMB procedure. Quant hCG to be completed morning of procedure if patient unable to void.    Review of Systems - The following ROS was otherwise negative, except as noted in the HPI:  constitutional, respiratory, cardiovascular, gastrointestinal, genitourinary    Objective:  /66 (BP Location: Left arm, Patient Position: Sitting)   Pulse 84   Temp 98.6 °F (37 °C) (Oral)   Resp 17   Ht 5' 3" (1.6 m)   Wt 63.2 kg (139 lb 6.4 oz)   SpO2 97%   BMI 24.69 kg/m²   General: Alert, well appearing, no acute distress  Abdomen: Soft, nontender, nondistended   Pelvic: deferred  Extremities: No redness or tenderness, neg Deena's sign  Osteopathic: no TART changes     Plan:  R/s for 1 week for EMB and quant HCG.  "

## 2023-04-26 ENCOUNTER — PATIENT OUTREACH (OUTPATIENT)
Dept: ADMINISTRATIVE | Facility: HOSPITAL | Age: 48
End: 2023-04-26

## 2023-04-26 ENCOUNTER — PROCEDURE VISIT (OUTPATIENT)
Dept: OBSTETRICS AND GYNECOLOGY | Facility: CLINIC | Age: 48
End: 2023-04-26
Payer: MEDICARE

## 2023-04-26 VITALS
SYSTOLIC BLOOD PRESSURE: 122 MMHG | BODY MASS INDEX: 24.8 KG/M2 | RESPIRATION RATE: 18 BRPM | OXYGEN SATURATION: 97 % | WEIGHT: 140 LBS | HEART RATE: 95 BPM | HEIGHT: 63 IN | TEMPERATURE: 98 F | DIASTOLIC BLOOD PRESSURE: 68 MMHG

## 2023-04-26 DIAGNOSIS — N92.1 MENOMETRORRHAGIA: Primary | ICD-10-CM

## 2023-04-26 DIAGNOSIS — Z12.4 SCREENING FOR MALIGNANT NEOPLASM OF THE CERVIX: ICD-10-CM

## 2023-04-26 PROCEDURE — 87624 HUMAN PAPILLOMAVIRUS (HPV): ICD-10-PCS | Mod: ,,, | Performed by: CLINICAL MEDICAL LABORATORY

## 2023-04-26 PROCEDURE — 99499 NO LOS: ICD-10-PCS | Mod: S$PBB,,, | Performed by: STUDENT IN AN ORGANIZED HEALTH CARE EDUCATION/TRAINING PROGRAM

## 2023-04-26 PROCEDURE — 88305 SURGICAL PATHOLOGY: ICD-10-PCS | Mod: 26,,, | Performed by: PATHOLOGY

## 2023-04-26 PROCEDURE — 99499 UNLISTED E&M SERVICE: CPT | Mod: S$PBB,,, | Performed by: STUDENT IN AN ORGANIZED HEALTH CARE EDUCATION/TRAINING PROGRAM

## 2023-04-26 PROCEDURE — 58100 ENDOMETRIAL BIOPSY: ICD-10-PCS | Mod: S$PBB,,, | Performed by: STUDENT IN AN ORGANIZED HEALTH CARE EDUCATION/TRAINING PROGRAM

## 2023-04-26 PROCEDURE — 88305 TISSUE EXAM BY PATHOLOGIST: CPT | Mod: 26,,, | Performed by: PATHOLOGY

## 2023-04-26 PROCEDURE — 88305 TISSUE EXAM BY PATHOLOGIST: CPT | Mod: TC,SUR | Performed by: STUDENT IN AN ORGANIZED HEALTH CARE EDUCATION/TRAINING PROGRAM

## 2023-04-26 PROCEDURE — 87624 HPV HI-RISK TYP POOLED RSLT: CPT | Mod: ,,, | Performed by: CLINICAL MEDICAL LABORATORY

## 2023-04-26 PROCEDURE — G0123 SCREEN CERV/VAG THIN LAYER: HCPCS | Mod: TC,GCY | Performed by: STUDENT IN AN ORGANIZED HEALTH CARE EDUCATION/TRAINING PROGRAM

## 2023-04-26 PROCEDURE — 58100 BIOPSY OF UTERUS LINING: CPT | Mod: PBBFAC | Performed by: STUDENT IN AN ORGANIZED HEALTH CARE EDUCATION/TRAINING PROGRAM

## 2023-04-26 NOTE — PROGRESS NOTES
2023 Care Everywhere updates requested and reviewed.  Immunizations reconciled. Media reports reviewed.  Duplicate HM overrides removed.  Overdue HM topic chart audit and/or requested.     [facility] and [facility] reviewed  Unable to send J[facility] to [facility] for Colonoscopy records -  consent  Care Team Updated  Noted to Appt  Letter mailed regarding HM Topics    Health Maintenance Due   Topic Date Due    Hepatitis C Screening  Never done    HIV Screening  Never done    TETANUS VACCINE  Never done    High Dose Statin  Never done    COVID-19 Vaccine (3 - Booster for Moderna series) 2022    Mammogram  2023

## 2023-04-26 NOTE — LETTER
April 26, 2023    Brian Duong  [address] [place] [address]             [facility]  [address] [place] [address] Dear Jamie, [facility] is committed to your overall health.  To help you get the most out of each of your visits, we will review your information to make sure you are up to date on all of your recommended tests and/or procedures.      Dr. See has found that you may be due for a Cervical Cancer Screening.      If you have had any of the above done at another facility, please bring the records or information with you so that your record at [facility] will be complete. You can also sign a release of information at your upcoming appointment on Wednesday, May 10th, 2023 at 09:30 AM so we can obtain the tests/procedures. If you are currently taking medication, please bring it with you to your appointment for review.    Thanks,    Elva Keller LPN Care Coordinator

## 2023-04-27 NOTE — PROCEDURES
Endometrial biopsy    Date/Time: 4/26/2023 9:00 AM  Performed by: Yariel Shea DO  Authorized by: Yariel Shea DO     Consent:     Consent obtained:  Written    Consent given by:  Patient    Patient questions answered: yes      Patient agrees, verbalizes understanding, and wants to proceed: yes    Indication:     Indications: Menorrhagia    Pre-procedure:     Pre-procedure timeout performed: yes    Procedure:     Procedure: endometrial biopsy with Pipelle      Cervix cleaned and prepped: yes      The cervix was dilated: yes      Uterus sounded: yes      Uterus sound depth (cm):  7    Specimen collected: specimen collected and sent to pathology      Patient tolerated procedure well with no complications: yes    Comments:     Procedure comments:  Likely insufficient sample

## 2023-04-28 LAB
ESTROGEN SERPL-MCNC: NORMAL PG/ML
INSULIN SERPL-ACNC: NORMAL U[IU]/ML
LAB AP CLINICAL INFORMATION: NORMAL
LAB AP GROSS DESCRIPTION: NORMAL
LAB AP LABORATORY NOTES: NORMAL
T3RU NFR SERPL: NORMAL %

## 2023-04-30 ENCOUNTER — EXTERNAL CHRONIC CARE MANAGEMENT (OUTPATIENT)
Dept: FAMILY MEDICINE | Facility: CLINIC | Age: 48
End: 2023-04-30
Payer: MEDICARE

## 2023-04-30 PROCEDURE — G0511 PR CHRONIC CARE MGMT, RHC OR FQHC ONLY, 20 MINS OR MORE: ICD-10-PCS | Mod: ,,, | Performed by: FAMILY MEDICINE

## 2023-04-30 PROCEDURE — G0511 CCM/BHI BY RHC/FQHC 20MIN MO: HCPCS | Mod: ,,, | Performed by: FAMILY MEDICINE

## 2023-05-02 LAB
GH SERPL-MCNC: NORMAL NG/ML
INSULIN SERPL-ACNC: NORMAL U[IU]/ML
LAB AP CLINICAL INFORMATION: NORMAL
LAB AP GYN INTERPRETATION: NEGATIVE
LAB AP PAP DISCLAIMER COMMENTS: NORMAL
RENIN PLAS-CCNC: NORMAL NG/ML/H

## 2023-05-04 LAB
HPV 16: NEGATIVE
HPV 18: NEGATIVE
HPV OTHER: NEGATIVE

## 2023-05-10 ENCOUNTER — OFFICE VISIT (OUTPATIENT)
Dept: FAMILY MEDICINE | Facility: CLINIC | Age: 48
End: 2023-05-10
Payer: MEDICARE

## 2023-05-10 VITALS
SYSTOLIC BLOOD PRESSURE: 106 MMHG | HEART RATE: 70 BPM | HEIGHT: 63 IN | TEMPERATURE: 98 F | BODY MASS INDEX: 25.16 KG/M2 | OXYGEN SATURATION: 97 % | RESPIRATION RATE: 20 BRPM | WEIGHT: 142 LBS | DIASTOLIC BLOOD PRESSURE: 75 MMHG

## 2023-05-10 DIAGNOSIS — F33.1 MODERATE EPISODE OF RECURRENT MAJOR DEPRESSIVE DISORDER: Chronic | ICD-10-CM

## 2023-05-10 DIAGNOSIS — E78.5 HYPERLIPIDEMIA, UNSPECIFIED HYPERLIPIDEMIA TYPE: ICD-10-CM

## 2023-05-10 DIAGNOSIS — E55.9 VITAMIN D DEFICIENCY: ICD-10-CM

## 2023-05-10 DIAGNOSIS — Z11.59 NEED FOR HEPATITIS C SCREENING TEST: Primary | ICD-10-CM

## 2023-05-10 LAB
25(OH)D3 SERPL-MCNC: 99.5 NG/ML
CHOLEST SERPL-MCNC: 169 MG/DL (ref 0–200)
CHOLEST/HDLC SERPL: 3 {RATIO}
HCV AB SER QL: REACTIVE
HDLC SERPL-MCNC: 57 MG/DL (ref 40–60)
LDLC SERPL CALC-MCNC: 82 MG/DL
LDLC/HDLC SERPL: 1.4 {RATIO}
NONHDLC SERPL-MCNC: 112 MG/DL
TRIGL SERPL-MCNC: 151 MG/DL (ref 35–150)
VLDLC SERPL-MCNC: 30 MG/DL

## 2023-05-10 PROCEDURE — 80061 LIPID PANEL: CPT | Mod: ,,, | Performed by: CLINICAL MEDICAL LABORATORY

## 2023-05-10 PROCEDURE — 82306 VITAMIN D: ICD-10-PCS | Mod: ,,, | Performed by: CLINICAL MEDICAL LABORATORY

## 2023-05-10 PROCEDURE — 99213 OFFICE O/P EST LOW 20 MIN: CPT | Mod: ,,, | Performed by: FAMILY MEDICINE

## 2023-05-10 PROCEDURE — 99213 PR OFFICE/OUTPT VISIT, EST, LEVL III, 20-29 MIN: ICD-10-PCS | Mod: ,,, | Performed by: FAMILY MEDICINE

## 2023-05-10 PROCEDURE — 86803 HEPATITIS C ANTIBODY: ICD-10-PCS | Mod: ,,, | Performed by: CLINICAL MEDICAL LABORATORY

## 2023-05-10 PROCEDURE — 80061 LIPID PANEL: ICD-10-PCS | Mod: ,,, | Performed by: CLINICAL MEDICAL LABORATORY

## 2023-05-10 PROCEDURE — 86803 HEPATITIS C AB TEST: CPT | Mod: ,,, | Performed by: CLINICAL MEDICAL LABORATORY

## 2023-05-10 PROCEDURE — 82306 VITAMIN D 25 HYDROXY: CPT | Mod: ,,, | Performed by: CLINICAL MEDICAL LABORATORY

## 2023-05-10 PROCEDURE — 87522 HCV MONITOR, COBAS AMPLICOR: ICD-10-PCS | Mod: 90,,, | Performed by: CLINICAL MEDICAL LABORATORY

## 2023-05-10 PROCEDURE — 87522 HEPATITIS C REVRS TRNSCRPJ: CPT | Mod: 90,,, | Performed by: CLINICAL MEDICAL LABORATORY

## 2023-05-10 RX ORDER — ARIPIPRAZOLE 5 MG/1
5 TABLET ORAL DAILY
Qty: 90 TABLET | Refills: 3 | Status: SHIPPED | OUTPATIENT
Start: 2023-05-10 | End: 2024-02-14 | Stop reason: SDUPTHER

## 2023-05-11 ENCOUNTER — OFFICE VISIT (OUTPATIENT)
Dept: OBSTETRICS AND GYNECOLOGY | Facility: CLINIC | Age: 48
End: 2023-05-11
Payer: MEDICARE

## 2023-05-11 VITALS
BODY MASS INDEX: 25.12 KG/M2 | OXYGEN SATURATION: 96 % | TEMPERATURE: 98 F | HEART RATE: 77 BPM | SYSTOLIC BLOOD PRESSURE: 109 MMHG | RESPIRATION RATE: 17 BRPM | WEIGHT: 141.81 LBS | HEIGHT: 63 IN | DIASTOLIC BLOOD PRESSURE: 66 MMHG

## 2023-05-11 DIAGNOSIS — R76.8 HEPATITIS C ANTIBODY TEST POSITIVE: Primary | ICD-10-CM

## 2023-05-11 DIAGNOSIS — N92.1 MENOMETRORRHAGIA: Primary | ICD-10-CM

## 2023-05-11 PROCEDURE — 99213 PR OFFICE/OUTPT VISIT, EST, LEVL III, 20-29 MIN: ICD-10-PCS | Mod: S$PBB,,, | Performed by: STUDENT IN AN ORGANIZED HEALTH CARE EDUCATION/TRAINING PROGRAM

## 2023-05-11 PROCEDURE — 99213 OFFICE O/P EST LOW 20 MIN: CPT | Mod: S$PBB,,, | Performed by: STUDENT IN AN ORGANIZED HEALTH CARE EDUCATION/TRAINING PROGRAM

## 2023-05-11 PROCEDURE — 99215 OFFICE O/P EST HI 40 MIN: CPT | Mod: PBBFAC | Performed by: STUDENT IN AN ORGANIZED HEALTH CARE EDUCATION/TRAINING PROGRAM

## 2023-05-11 NOTE — PROGRESS NOTES
"Return Gyn Office Visit    Assessment/Plan  Problem List Items Addressed This Visit    None  Visit Diagnoses       Menometrorrhagia    -  Primary              CC:   Chief Complaint   Patient presents with    Discuss EMB results      HPI:  47 y.o. who presents to office for EMB results.  States her cycle started right after the biopsy 2 weeks ago.  Denies any hot flashes or night sweats.    Review of Systems - The following ROS was otherwise negative, except as noted in the HPI:  constitutional, respiratory, cardiovascular, gastrointestinal, genitourinary    Objective:  /66 (BP Location: Left arm, Patient Position: Sitting)   Pulse 77   Temp 97.8 °F (36.6 °C) (Oral)   Resp 17   Ht 5' 3" (1.6 m)   Wt 64.3 kg (141 lb 12.8 oz)   LMP 05/04/2023 (Exact Date)   SpO2 96%   BMI 25.12 kg/m²   General: Alert, well appearing, no acute distress  Abdomen: Soft, nontender, nondistended   Extremities: No redness or tenderness, neg Deena's sign  Osteopathic: no TART changes     Plan:  Keep bleeding calendar.  Follow-up in 3 months.  "

## 2023-05-12 LAB — HCV RNA SERPL NAA+PROBE-ACNC: ABNORMAL IU/ML

## 2023-05-18 NOTE — PROGRESS NOTES
I spoke with pt's step mother and she will take pt to her appointment with Arabella Rebollar on 05/22/2023

## 2023-05-21 NOTE — PROGRESS NOTES
Subjective     Patient ID: Brian Duong is a 47 y.o. female.    Chief Complaint: 3 month check up and Depression (Depression is much better taking Abilify)    48 yo WF here with her step mother for check up. Feeling better on the abilify. Has seen GYN and awaiting test results to decide about management of bleeding.     DepressionPatient is not experiencing: shortness of breath.      Review of Systems   Constitutional:  Negative for activity change.   Eyes:  Negative for visual disturbance.   Respiratory:  Negative for shortness of breath.    Cardiovascular:  Negative for chest pain.   Gastrointestinal:  Negative for abdominal pain.   Neurological:  Negative for headaches.   Psychiatric/Behavioral:  Positive for depression. Negative for dysphoric mood.         Office Visit on 05/10/2023   Component Date Value Ref Range Status    Vitamin D 25-Hydroxy, Blood 05/10/2023 99.5  ng/mL Final    Vitamin D 25-OH Adult Reference Values:  Deficiency: <20 ng/mL  Insufficiency: 20 - <30 ng/mL  Sufficiency: 30 -100 ng/mL    Vitamin D 25-OH Pediatric Reference Values:  Deficiency: <15 ng/mL  Insufficiency: 15 - <20 ng/mL  Sufficiency: 20 - 100 ng/mL    Triglycerides 05/10/2023 151 (H)  35 - 150 mg/dL Final      Normal:  <150 mg/dL  Borderline High: 150-199 mg/dL  High:   200-499 mg/dL  Very High:  >=500    Cholesterol 05/10/2023 169  0 - 200 mg/dL Final      <200 mg/dL:  Desirable  200-240 mg/dL: Borderline High  >240 mg/dL:  High    HDL Cholesterol 05/10/2023 57  40 - 60 mg/dL Final      <40 mg/dL: Low HDL  40-60 mg/dL: Normal  >60 mg/dL: Desirable    Cholesterol/HDL Ratio (Risk Factor) 05/10/2023 3.0   Final    Non-HDL 05/10/2023 112  mg/dL Final    LDL Calculated 05/10/2023 82  mg/dL Final    LDL/HDL 05/10/2023 1.4   Final    VLDL 05/10/2023 30  mg/dL Final    Hepatitis C Ab 05/10/2023 Reactive (A)  Non-Reactive Final    HCV RNA Detect/Quant 05/10/2023 [id number] (A)  Undetected IU/mL Final    Result in log IU/mL is 6.53.    "  -------------------ADDITIONAL INFORMATION-------------------  The quantification range of this assay is 15 to 100,000,000   IU/mL (1.18 log to 8.00 log IU/mL). Testing was performed   using the alejandra HCV test ([employer]   with the alejandra 6800 System.     Test Performed by:  [facility]  [address] [place] [address]  : Haile Mejia M.D. Ph.D.; CLIA# [id number]       Objective   Blood pressure 106/75, pulse 70, temperature 98 °F (36.7 °C), temperature source Oral, resp. rate 20, height 5' 3" (1.6 m), weight 64.4 kg (142 lb), last menstrual period 05/04/2023, SpO2 97 %.    Physical Exam  Constitutional:       Appearance: She is obese.   HENT:      Head: Normocephalic and atraumatic.      Right Ear: External ear normal.      Left Ear: External ear normal.      Nose: Nose normal.      Mouth/Throat:      Mouth: Mucous membranes are moist.   Eyes:      Conjunctiva/sclera: Conjunctivae normal.      Pupils: Pupils are equal, round, and reactive to light.   Cardiovascular:      Rate and Rhythm: Normal rate and regular rhythm.      Pulses: Normal pulses.      Heart sounds: Normal heart sounds.   Pulmonary:      Effort: Pulmonary effort is normal.      Breath sounds: Normal breath sounds.   Abdominal:      General: Abdomen is flat.      Palpations: Abdomen is soft.   Musculoskeletal:         General: Normal range of motion.      Cervical back: Normal range of motion and neck supple.   Skin:     General: Skin is warm and dry.   Neurological:      General: No focal deficit present.      Mental Status: She is alert and oriented to person, place, and time.   Psychiatric:         Mood and Affect: Mood normal.         Behavior: Behavior normal.          Assessment and Plan     Problem List Items Addressed This Visit          Psychiatric    Depression    Relevant Medications    ARIPiprazole (ABILIFY) 5 MG Tab       Cardiac/Vascular    " Hyperlipidemia       Endocrine    Vitamin D deficiency     Other Visit Diagnoses       Need for hepatitis C screening test    -  Primary    Relevant Orders    Hepatitis C Antibody (Completed)    Hepatitis C Virus Quantitative (Completed)            RTC 6 months for recheck.

## 2023-05-22 ENCOUNTER — OFFICE VISIT (OUTPATIENT)
Dept: GASTROENTEROLOGY | Facility: CLINIC | Age: 48
End: 2023-05-22
Payer: MEDICARE

## 2023-05-22 VITALS
HEIGHT: 63 IN | SYSTOLIC BLOOD PRESSURE: 120 MMHG | DIASTOLIC BLOOD PRESSURE: 83 MMHG | WEIGHT: 141.81 LBS | BODY MASS INDEX: 25.12 KG/M2

## 2023-05-22 DIAGNOSIS — R76.8 HEPATITIS C ANTIBODY TEST POSITIVE: ICD-10-CM

## 2023-05-22 PROCEDURE — 99215 OFFICE O/P EST HI 40 MIN: CPT | Mod: PBBFAC | Performed by: NURSE PRACTITIONER

## 2023-05-22 PROCEDURE — 99214 PR OFFICE/OUTPT VISIT, EST, LEVL IV, 30-39 MIN: ICD-10-PCS | Mod: S$PBB,,, | Performed by: NURSE PRACTITIONER

## 2023-05-22 PROCEDURE — 99214 OFFICE O/P EST MOD 30 MIN: CPT | Mod: S$PBB,,, | Performed by: NURSE PRACTITIONER

## 2023-05-22 NOTE — PROGRESS NOTES
Brian Duong is a 47 y.o. female here for Hepatitis C        PCP: Anna See  Referring Provider: Anna See Md  [address]  [place] [address]     HPI:  Presents for initial visit due to hepatitis-C.  Labs on 05/10 reviewed, hepatitis-C PCR [id number].  Patient denies any previous IV drug use.  Patient does not drink alcohol.  We did discuss ordering liver labs today for further evaluation. Denies previous blood transfusion.  We did discuss that hepatitis-C is contagious through blood and body fluids.  She is been advised to avoid sharing needles or razors.  We discussed that we will do further lab evaluation to determine genotype and then consider further treatment with possible Epclusa.  Patient is on multiple antiseizure medications.  We will also order abdominal ultrasound for imaging as well as elastography.  States that she has had a colonoscopy but that it was greater than 10 years ago.  We did discuss ordering a colonoscopy screening in the future.  Endorses constipation.  She does take a stool softener daily.  No hematochezia or melena.        ROS:  Review of Systems   Constitutional:  Negative for appetite change, fatigue, fever and unexpected weight change.   HENT:  Negative for trouble swallowing.    Respiratory:  Negative for shortness of breath.    Cardiovascular:  Negative for chest pain.   Gastrointestinal:  Positive for constipation. Negative for abdominal pain, blood in stool, change in bowel habit, diarrhea, nausea, vomiting, reflux and change in bowel habit.   Musculoskeletal:  Negative for gait problem.   Integumentary:  Negative for pallor.   Neurological:  Negative for light-headedness.   Psychiatric/Behavioral:  The patient is not nervous/anxious.         PMHX:  has a past medical history of Aneurysm, Arthritis, Chronic migraine without aura, intractable, without status migrainosus, Epilepsy characterized by intractable complex partial  seizures, GERD (gastroesophageal reflux disease), Headache, Hydrocephalus, and Hypertension.    PSHX:  has a past surgical history that includes Vagus nerve stimulator insertion and Ventriculoperitoneal shunt.    PFHX: family history includes Breast cancer in her maternal aunt; Cancer in her mother; Hyperlipidemia in her father; Thyroid disease in her mother.    PSlHX:  reports that she has never smoked. She has never been exposed to tobacco smoke. She has never used smokeless tobacco. She reports that she does not drink alcohol and does not use drugs.        Review of patient's allergies indicates:  No Known Allergies    Medication List with Changes/Refills   Current Medications    ACETAMINOPHEN (TYLENOL) 650 MG TBSR    Take 650 mg by mouth 2 (two) times daily.    ARIPIPRAZOLE (ABILIFY) 5 MG TAB    Take 1 tablet (5 mg total) by mouth once daily.    CITALOPRAM (CELEXA) 40 MG TABLET    Take 1 tablet (40 mg total) by mouth once daily.    CYANOCOBALAMIN (VITAMIN B-12) 1000 MCG TABLET    Take 100 mcg by mouth once daily.    DOCUSATE SODIUM (COLACE) 100 MG CAPSULE    Take 100 mg by mouth 2 (two) times daily.    ERENUMAB-AOOE (AIMOVIG) 140 MG/ML AUTOINJECTOR    Inject 1 mL (140 mg total) into the skin every 28 days.    FOLIC ACID (FOLVITE) 1 MG TABLET    TAKE 2 TABLETS(2000 MCG) BY MOUTH EVERY EVENING Strength: 1 mg    FOLIC ACID/MULTIVIT-MIN/LUTEIN (ADULT MULTIVITAMIN, W-LUTEIN, ORAL)    Take by mouth.    LAMOTRIGINE (LAMICTAL) 200 MG TABLET    1.5 tab by mouth twice daily    LEVETIRACETAM (KEPPRA) 1000 MG TABLET    Take 1 tablet (1,000 mg total) by mouth 2 (two) times daily.    MECLIZINE (ANTIVERT) 25 MG TABLET    Take 1 tablet (25 mg total) by mouth 3 (three) times daily as needed for Dizziness.    METHOCARBAMOL (ROBAXIN) 500 MG TAB    Take 500 mg by mouth 4 (four) times daily as needed. 1 or 2 tabs as needed    MUPIROCIN (BACTROBAN) 2 % OINTMENT    APPLY TO ARM DAILY    OMEPRAZOLE (PRILOSEC) 20 MG CAPSULE    Take 1  "capsule (20 mg total) by mouth once daily.    TRIAMCINOLONE ACETONIDE 0.1% (KENALOG) 0.1 % OINTMENT    APPLY TOPICALLY TO ARMS TWICE DAILY FOR 2 WEEKS. AVOID FACE    VITAMIN D (VITAMIN D3) 1000 UNITS TAB    Take 1,000 Units by mouth once daily.        Objective Findings:  Vital Signs:  /83   Ht 5' 3" (1.6 m)   Wt 64.3 kg (141 lb 12.8 oz)   LMP 05/04/2023 (Exact Date)   BMI 25.12 kg/m²  Body mass index is 25.12 kg/m².    Physical Exam:  Physical Exam  Vitals and nursing note reviewed.   Constitutional:       General: She is not in acute distress.     Appearance: Normal appearance.   HENT:      Mouth/Throat:      Mouth: Mucous membranes are moist.   Cardiovascular:      Rate and Rhythm: Normal rate.   Pulmonary:      Effort: Pulmonary effort is normal.      Breath sounds: No wheezing, rhonchi or rales.   Abdominal:      General: Bowel sounds are normal. There is no distension.      Palpations: Abdomen is soft. There is no mass.      Tenderness: There is no abdominal tenderness. There is no guarding.   Skin:     General: Skin is warm and dry.      Coloration: Skin is not jaundiced or pale.   Neurological:      Mental Status: She is alert and oriented to person, place, and time.   Psychiatric:         Mood and Affect: Mood normal.        Labs:  Lab Results   Component Value Date    WBC 7.83 05/22/2023    HGB 13.6 05/22/2023    HCT 41.0 05/22/2023    .2 (H) 05/22/2023    RDW 11.3 (L) 05/22/2023     05/22/2023    LYMPH 33.0 05/22/2023    LYMPH 2.58 05/22/2023    MONO 7.3 (H) 05/22/2023    EOS 0.03 05/22/2023    BASO 0.05 05/22/2023     Lab Results   Component Value Date     08/22/2022    K 4.5 08/22/2022     08/22/2022    CO2 27 08/22/2022    GLU 84 08/22/2022    BUN 13 08/22/2022    CREATININE 0.66 08/22/2022    CALCIUM 9.0 08/22/2022    PROT 7.1 08/22/2022    ALBUMIN 3.5 08/22/2022    BILITOT 0.5 08/22/2022    ALKPHOS 130 (H) 08/22/2022    AST 25 08/22/2022    ALT 27 08/22/2022 "         Imaging: No results found.      Assessment:  Brian Duong is a 47 y.o. female here with:  1. Hepatitis C antibody test positive          Recommendations:  1. Schedule abdominal ultrasound and elastography  2. Labs below today  3. Consider treatment of hep C using Epclusa pending genotype labs  4. Blood and body fluid precautions were discussed with the patient and family    Follow up in about 4 weeks (around 6/19/2023).      Order summary:  Orders Placed This Encounter    US Abdomen Complete    US Elastography Liver w/imaging    Comprehensive Metabolic Panel    Hepatitis C Genotype    CBC Auto Differential    Hepatitis B Core Antibody, IgM    NIRALI EIA w/ Reflex to dsDNA/PREM    Hepatitis B Surface Ab, Qualitative    Hepatitis B Surface Antigen    Protein Electrophoresis, Serum with Reflex TONY    Hepatitis A Antibody, Total    HIV 1/2 Ag/Ab (4th Gen)       Thank you for allowing me to participate in the care of Brian Duong.      AMY Perry-C

## 2023-05-23 ENCOUNTER — TELEPHONE (OUTPATIENT)
Dept: GASTROENTEROLOGY | Facility: CLINIC | Age: 48
End: 2023-05-23
Payer: MEDICARE

## 2023-05-23 ENCOUNTER — HOSPITAL ENCOUNTER (OUTPATIENT)
Dept: RADIOLOGY | Facility: HOSPITAL | Age: 48
Discharge: HOME | End: 2023-05-23
Payer: MEDICARE

## 2023-05-23 VITALS — HEIGHT: 62 IN | WEIGHT: 141 LBS | BODY MASS INDEX: 25.95 KG/M2

## 2023-05-23 DIAGNOSIS — Z12.31 BREAST CANCER SCREENING BY MAMMOGRAM: ICD-10-CM

## 2023-05-23 PROCEDURE — 77067 SCR MAMMO BI INCL CAD: CPT | Mod: TC

## 2023-05-23 NOTE — TELEPHONE ENCOUNTER
Attempted to call results. Left message.                ----- Message from AMY Barton sent at 5/23/2023  7:56 AM CDT -----  So far labs are okay. HIV is negative. Hepatitis B is negative. Genotype is pending

## 2023-05-23 NOTE — TELEPHONE ENCOUNTER
Results called to patients mother. Verbalized understanding. Appointment made for 7/5/23 at 1:00pm            ----- Message from AMY Barton sent at 5/23/2023  7:56 AM CDT -----  So far labs are okay. HIV is negative. Hepatitis B is negative. Genotype is pending

## 2023-05-31 ENCOUNTER — HOSPITAL ENCOUNTER (OUTPATIENT)
Dept: RADIOLOGY | Facility: HOSPITAL | Age: 48
Discharge: HOME | End: 2023-05-31
Attending: NURSE PRACTITIONER
Payer: MEDICARE

## 2023-05-31 ENCOUNTER — EXTERNAL CHRONIC CARE MANAGEMENT (OUTPATIENT)
Dept: FAMILY MEDICINE | Facility: CLINIC | Age: 48
End: 2023-05-31
Payer: MEDICARE

## 2023-05-31 DIAGNOSIS — R76.8 HEPATITIS C ANTIBODY TEST POSITIVE: ICD-10-CM

## 2023-05-31 PROCEDURE — 76700 US EXAM ABDOM COMPLETE: CPT | Mod: 26,,, | Performed by: RADIOLOGY

## 2023-05-31 PROCEDURE — G0511 PR CHRONIC CARE MGMT, RHC OR FQHC ONLY, 20 MINS OR MORE: ICD-10-PCS | Mod: ,,, | Performed by: FAMILY MEDICINE

## 2023-05-31 PROCEDURE — 76700 US ABDOMEN COMPLETE: ICD-10-PCS | Mod: 26,,, | Performed by: RADIOLOGY

## 2023-05-31 PROCEDURE — 76700 US EXAM ABDOM COMPLETE: CPT | Mod: TC

## 2023-05-31 PROCEDURE — G0511 CCM/BHI BY RHC/FQHC 20MIN MO: HCPCS | Mod: ,,, | Performed by: FAMILY MEDICINE

## 2023-06-01 ENCOUNTER — TELEPHONE (OUTPATIENT)
Dept: GASTROENTEROLOGY | Facility: CLINIC | Age: 48
End: 2023-06-01
Payer: MEDICARE

## 2023-06-01 DIAGNOSIS — R76.8 HEPATITIS C ANTIBODY TEST POSITIVE: Primary | ICD-10-CM

## 2023-06-01 NOTE — TELEPHONE ENCOUNTER
Results and recommendations called to patients mother. Verbalized understanding. Scheduled Ct for 6/20/23 at 9:30am. Mother states she will call back and let me know if that date would be okay due to having pending plans to go out of town.              ----- Message from AMY Barton sent at 6/1/2023  2:53 PM CDT -----  Schedule CT triphasic with diagnosis hepatitis C and liver lesion. Probably hemangioma. But recommend further imaging for clarification.

## 2023-06-20 ENCOUNTER — TELEPHONE (OUTPATIENT)
Dept: GASTROENTEROLOGY | Facility: CLINIC | Age: 48
End: 2023-06-20
Payer: MEDICARE

## 2023-06-20 ENCOUNTER — HOSPITAL ENCOUNTER (OUTPATIENT)
Dept: RADIOLOGY | Facility: HOSPITAL | Age: 48
Discharge: HOME | End: 2023-06-20
Attending: NURSE PRACTITIONER
Payer: MEDICARE

## 2023-06-20 DIAGNOSIS — R76.8 HEPATITIS C ANTIBODY TEST POSITIVE: ICD-10-CM

## 2023-06-20 PROCEDURE — 74170 CT ABD WO CNTRST FLWD CNTRST: CPT | Mod: 26,,, | Performed by: STUDENT IN AN ORGANIZED HEALTH CARE EDUCATION/TRAINING PROGRAM

## 2023-06-20 PROCEDURE — 74170 CT ABD WO CNTRST FLWD CNTRST: CPT | Mod: TC

## 2023-06-20 PROCEDURE — 25500020 PHARM REV CODE 255: Performed by: NURSE PRACTITIONER

## 2023-06-20 PROCEDURE — 74170 CT ABDOMEN W/O CONTRAST PLUS TRIPHASIC W/CONTRAST: ICD-10-PCS | Mod: 26,,, | Performed by: STUDENT IN AN ORGANIZED HEALTH CARE EDUCATION/TRAINING PROGRAM

## 2023-06-20 RX ADMIN — IOPAMIDOL 100 ML: 755 INJECTION, SOLUTION INTRAVENOUS at 09:06

## 2023-06-20 NOTE — TELEPHONE ENCOUNTER
Attempted to call results. Left message.            ----- Message from AMY Barton sent at 6/20/2023 11:14 AM CDT -----  Multiple hemangiomas of the liver.  No other liver lesions.

## 2023-06-21 ENCOUNTER — TELEPHONE (OUTPATIENT)
Dept: GASTROENTEROLOGY | Facility: CLINIC | Age: 48
End: 2023-06-21
Payer: MEDICARE

## 2023-06-21 NOTE — TELEPHONE ENCOUNTER
Attempted to call results to number listed for patients mother. No answer or voicemail noted.        ----- Message from AMY Barton sent at 6/20/2023 11:14 AM CDT -----  Multiple hemangiomas of the liver.  No other liver lesions.

## 2023-06-21 NOTE — TELEPHONE ENCOUNTER
Results called to patients mother. Verbalized understanding.            ----- Message from AMY Barton sent at 6/20/2023 11:14 AM CDT -----  Multiple hemangiomas of the liver.  No other liver lesions.

## 2023-06-30 ENCOUNTER — EXTERNAL CHRONIC CARE MANAGEMENT (OUTPATIENT)
Dept: FAMILY MEDICINE | Facility: CLINIC | Age: 48
End: 2023-06-30
Payer: MEDICARE

## 2023-06-30 PROCEDURE — G0511 PR CHRONIC CARE MGMT, RHC OR FQHC ONLY, 20 MINS OR MORE: ICD-10-PCS | Mod: ,,, | Performed by: FAMILY MEDICINE

## 2023-06-30 PROCEDURE — G0511 CCM/BHI BY RHC/FQHC 20MIN MO: HCPCS | Mod: ,,, | Performed by: FAMILY MEDICINE

## 2023-07-31 ENCOUNTER — EXTERNAL CHRONIC CARE MANAGEMENT (OUTPATIENT)
Dept: FAMILY MEDICINE | Facility: CLINIC | Age: 48
End: 2023-07-31
Payer: MEDICARE

## 2023-07-31 PROCEDURE — G0511 CCM/BHI BY RHC/FQHC 20MIN MO: HCPCS | Mod: ,,, | Performed by: FAMILY MEDICINE

## 2023-07-31 PROCEDURE — G0511 PR CHRONIC CARE MGMT, RHC OR FQHC ONLY, 20 MINS OR MORE: ICD-10-PCS | Mod: ,,, | Performed by: FAMILY MEDICINE

## 2023-08-10 ENCOUNTER — OFFICE VISIT (OUTPATIENT)
Dept: OBSTETRICS AND GYNECOLOGY | Facility: CLINIC | Age: 48
End: 2023-08-10
Payer: MEDICARE

## 2023-08-10 VITALS
HEIGHT: 62 IN | HEART RATE: 107 BPM | OXYGEN SATURATION: 98 % | DIASTOLIC BLOOD PRESSURE: 70 MMHG | RESPIRATION RATE: 17 BRPM | SYSTOLIC BLOOD PRESSURE: 118 MMHG | BODY MASS INDEX: 26.28 KG/M2 | TEMPERATURE: 98 F | WEIGHT: 142.81 LBS

## 2023-08-10 DIAGNOSIS — N92.1 MENOMETRORRHAGIA: Primary | ICD-10-CM

## 2023-08-10 PROCEDURE — 99214 PR OFFICE/OUTPT VISIT, EST, LEVL IV, 30-39 MIN: ICD-10-PCS | Mod: S$PBB,,, | Performed by: STUDENT IN AN ORGANIZED HEALTH CARE EDUCATION/TRAINING PROGRAM

## 2023-08-10 PROCEDURE — 99214 OFFICE O/P EST MOD 30 MIN: CPT | Mod: S$PBB,,, | Performed by: STUDENT IN AN ORGANIZED HEALTH CARE EDUCATION/TRAINING PROGRAM

## 2023-08-10 PROCEDURE — 99215 OFFICE O/P EST HI 40 MIN: CPT | Mod: PBBFAC | Performed by: STUDENT IN AN ORGANIZED HEALTH CARE EDUCATION/TRAINING PROGRAM

## 2023-08-10 NOTE — PROGRESS NOTES
"Return Gyn Office Visit    Assessment/Plan  Problem List Items Addressed This Visit    None  Visit Diagnoses       Menometrorrhagia    -  Primary              CC:   Chief Complaint   Patient presents with    Follow-up     3 month follow up        HPI:  47 y.o. who presents to office for 3 month follow-up.  C/o abnormal bleeding every month. Reports bleeding more than once a month.  Bleeding every 1.5 weeks.  Reports history of kidney problems with birth control pills in the past.  States she is not keeping a diary of bleeding as we previously discussed.    Review of Systems - The following ROS was otherwise negative, except as noted in the HPI:  constitutional, respiratory, cardiovascular, gastrointestinal, genitourinary    Objective:  /70   Pulse 107   Temp 98.4 °F (36.9 °C)   Resp 17   Ht 5' 2" (1.575 m)   Wt 64.8 kg (142 lb 12.8 oz)   SpO2 98%   BMI 26.12 kg/m²   General: Alert, well appearing, no acute distress  Abdomen: Soft, nontender, nondistended   Extremities: No redness or tenderness, neg Deena's sign  Osteopathic: no TART changes      Consider medical management (POP) vs surgical (ablation, HDC/IUD, hysterectomy)  Patient to discuss with family at home, will office with decision.  Encouraged patient to keep bleeding diary.      "

## 2023-08-31 ENCOUNTER — EXTERNAL CHRONIC CARE MANAGEMENT (OUTPATIENT)
Dept: FAMILY MEDICINE | Facility: CLINIC | Age: 48
End: 2023-08-31
Payer: MEDICARE

## 2023-08-31 PROCEDURE — G0511 CCM/BHI BY RHC/FQHC 20MIN MO: HCPCS | Mod: ,,, | Performed by: FAMILY MEDICINE

## 2023-08-31 PROCEDURE — G0511 PR CHRONIC CARE MGMT, RHC OR FQHC ONLY, 20 MINS OR MORE: ICD-10-PCS | Mod: ,,, | Performed by: FAMILY MEDICINE

## 2023-09-19 ENCOUNTER — OFFICE VISIT (OUTPATIENT)
Dept: NEUROLOGY | Facility: CLINIC | Age: 48
End: 2023-09-19
Payer: MEDICARE

## 2023-09-19 VITALS
HEART RATE: 81 BPM | SYSTOLIC BLOOD PRESSURE: 112 MMHG | BODY MASS INDEX: 25.69 KG/M2 | OXYGEN SATURATION: 99 % | DIASTOLIC BLOOD PRESSURE: 84 MMHG | WEIGHT: 145 LBS | HEIGHT: 63 IN

## 2023-09-19 DIAGNOSIS — G40.309 NONINTRACTABLE GENERALIZED IDIOPATHIC EPILEPSY WITHOUT STATUS EPILEPTICUS: Chronic | ICD-10-CM

## 2023-09-19 DIAGNOSIS — Z45.42 ENCOUNTER FOR FITTING AND ADJUSTMENT OF VAGUS NERVE STIMULATOR: Primary | ICD-10-CM

## 2023-09-19 DIAGNOSIS — G44.229 CHRONIC TENSION-TYPE HEADACHE, NOT INTRACTABLE: ICD-10-CM

## 2023-09-19 DIAGNOSIS — Z79.899 ENCOUNTER FOR LONG-TERM CURRENT USE OF MEDICATION: ICD-10-CM

## 2023-09-19 PROCEDURE — 99213 PR OFFICE/OUTPT VISIT, EST, LEVL III, 20-29 MIN: ICD-10-PCS | Mod: S$PBB,,, | Performed by: NURSE PRACTITIONER

## 2023-09-19 PROCEDURE — 99215 OFFICE O/P EST HI 40 MIN: CPT | Mod: PBBFAC | Performed by: NURSE PRACTITIONER

## 2023-09-19 PROCEDURE — 99213 OFFICE O/P EST LOW 20 MIN: CPT | Mod: S$PBB,,, | Performed by: NURSE PRACTITIONER

## 2023-09-19 RX ORDER — LAMOTRIGINE 200 MG/1
TABLET ORAL
Qty: 270 TABLET | Refills: 3 | Status: SHIPPED | OUTPATIENT
Start: 2023-09-19 | End: 2024-03-19 | Stop reason: SDUPTHER

## 2023-09-19 RX ORDER — LEVETIRACETAM 1000 MG/1
1000 TABLET ORAL 2 TIMES DAILY
Qty: 270 TABLET | Refills: 3 | Status: SHIPPED | OUTPATIENT
Start: 2023-09-19 | End: 2023-11-30

## 2023-09-19 NOTE — PROGRESS NOTES
Subjective:       Patient ID: Brian Duong is a 48 y.o. female     Chief Complaint:    Chief Complaint   Patient presents with    Follow-up    Seizures     Last episode was thursday            History of Present Illness  49 y/o WF followed in clinic for history of epilepsy, s/p VNS, hydrocephalus with history of  shunt placement >15 years ago, and headaches.  Family present with her.    Last lamictal level was in August 2022, and was WNL at 13    I checked her VNS today, we did not make any changes to her settings.  Battery life listed 25-50%    She has the Lamictal 200mg tablet taking 1.5 tabs BID and the Keppra 1000mg BID as well.  There was question of possible seizures about 3 weeks ago, but she was not seen in the ED and reports doing well before and since then.  She is due for lamictal levels.    Headaches are reported as very well.  This was greatly improved after we got her started on the Aimovig injections.     She is also on Celexa for depression and denies any worsening in mood to me, but I do note she has been started on abilify since her last visit with us.           Review of Systems  Review of Systems   Constitutional:  Negative for diaphoresis and fever.   HENT:  Negative for congestion, hearing loss and tinnitus.    Eyes:  Negative for blurred vision, double vision, photophobia, discharge and redness.   Respiratory:  Negative for cough and shortness of breath.    Cardiovascular:  Negative for chest pain.   Gastrointestinal:  Negative for abdominal pain, nausea and vomiting.   Musculoskeletal:  Negative for back pain, falls, joint pain, myalgias and neck pain.   Skin:  Negative for itching and rash.   Neurological:  Positive for seizures and headaches. Negative for dizziness, tremors, sensory change, speech change, focal weakness, loss of consciousness and weakness.   Psychiatric/Behavioral:  Positive for depression and memory loss. Negative for hallucinations. The patient does not have  insomnia.    All other systems reviewed and are negative.     Objective:     NEUROLOGICAL EXAMINATION:     MENTAL STATUS   Oriented to person, place, and time.   Registration: recalls 3 of 3 objects. Recall at 5 minutes: recalls 3 of 3 objects.   Attention: normal. Concentration: normal.   Speech: speech is normal   Level of consciousness: alert  Knowledge: good and consistent with education.   Normal comprehension.     CRANIAL NERVES     CN II   Visual fields full to confrontation.   Visual acuity: normal  Right visual field deficit: none  Left visual field deficit: none     CN III, IV, VI   Pupils are equal, round, and reactive to light.  Extraocular motions are normal.   Right pupil: Size: 3 mm. Shape: regular. Reactivity: brisk. Consensual response: intact. Accommodation: intact.   Left pupil: Size: 3 mm. Shape: regular. Reactivity: brisk. Consensual response: intact. Accommodation: intact.   CN III: no CN III palsy  CN VI: no CN VI palsy  Nystagmus: none   Diplopia: none  Upgaze: normal  Downgaze: normal  Conjugate gaze: present  Vestibulo-ocular reflex: present    CN V   Facial sensation intact.   Right facial sensation deficit: none  Left facial sensation deficit: none  Right corneal reflex: normal  Left corneal reflex: normal    CN VII   Facial expression full, symmetric.   Right facial weakness: none  Left facial weakness: none  Right taste: normal  Left taste: normal    CN VIII   CN VIII normal.   Hearing: intact    CN IX, X   CN IX normal.   CN X normal.   Palate: symmetric    CN XI   CN XI normal.   Right sternocleidomastoid strength: normal  Left sternocleidomastoid strength: normal  Right trapezius strength: normal  Left trapezius strength: normal    CN XII   CN XII normal.   Tongue: not atrophic  Fasciculations: absent  Tongue deviation: none    MOTOR EXAM   Muscle bulk: normal  Overall muscle tone: normal  Right arm tone: normal  Left arm tone: increased  Right arm pronator drift: absent  Left arm  pronator drift: absent  Right leg tone: normal  Left leg tone: increased    Strength   Right neck flexion: 5/5  Left neck flexion: 5/5  Right neck extension: 5/5  Left neck extension: 5/5  Right deltoid: 5/5  Left deltoid: 4/5  Right biceps: 5/5  Left biceps: 4/5  Right triceps: 5/5  Left triceps: 4/5  Right wrist flexion: 5/5  Left wrist flexion: 4/5  Right wrist extension: 5/5  Left wrist extension: 4/5  Right interossei: 5/5  Left interossei: 4/5  Right iliopsoas: 5/5  Left iliopsoas: 4/5  Right quadriceps: 5/5  Left quadriceps: 4/5  Right hamstrin/5  Left hamstrin/5  Right anterior tibial: 5/5  Left anterior tibial: 4/5  Right posterior tibial: 5/5  Left posterior tibial: 4/5  Right gastroc: 5/5  Left gastroc: 4/5    REFLEXES     Reflexes   Right brachioradialis: 2+  Left brachioradialis: 2+  Right biceps: 2+  Left biceps: 2+  Right triceps: 2+  Left triceps: 2+  Right patellar: 2+  Left patellar: 2+  Right achilles: 2+  Left achilles: 2+  Right plantar: normal  Left plantar: normal  Right Hardin: absent  Left Hardin: absent  Right ankle clonus: absent  Left ankle clonus: absent  Right pendular knee jerk: absent  Left pendular knee jerk: absent    SENSORY EXAM   Light touch normal.   Right arm light touch: normal  Left arm light touch: normal  Right leg light touch: normal  Left leg light touch: normal  Vibration normal.   Right arm vibration: normal  Left arm vibration: normal  Right leg vibration: normal  Left leg vibration: normal  Proprioception normal.   Right arm proprioception: normal  Left arm proprioception: normal  Right leg proprioception: normal  Left leg proprioception: normal  Pinprick normal.   Right arm pinprick: normal  Left arm pinprick: normal  Right leg pinprick: normal  Left leg pinprick: normal  Graphesthesia: normal  Romberg: negative  Stereognosis: normal    GAIT AND COORDINATION      Coordination   Finger to nose coordination: abnormal  Heel to shin coordination:  abnormal  Tandem walking coordination: abnormal    Tremor   Resting tremor: absent  Intention tremor: absent  Action tremor: absent       Physical Exam  Vitals and nursing note reviewed.   Constitutional:       Appearance: Normal appearance.   HENT:      Head: Normocephalic.   Eyes:      Extraocular Movements: Extraocular movements intact and EOM normal.      Pupils: Pupils are equal, round, and reactive to light.   Cardiovascular:      Rate and Rhythm: Normal rate and regular rhythm.   Pulmonary:      Effort: Pulmonary effort is normal.      Breath sounds: Normal breath sounds.   Musculoskeletal:         General: No swelling or tenderness. Normal range of motion.      Cervical back: Normal range of motion and neck supple.      Right lower leg: No edema.      Left lower leg: No edema.   Skin:     General: Skin is warm and dry.      Coloration: Skin is not jaundiced.      Findings: No rash.   Neurological:      General: No focal deficit present.      Mental Status: She is alert and oriented to person, place, and time. Mental status is at baseline.      GCS: GCS eye subscore is 4. GCS verbal subscore is 5. GCS motor subscore is 6.      Cranial Nerves: No cranial nerve deficit.      Sensory: No sensory deficit.      Motor: Motor function is intact. No weakness.      Coordination: Coordination abnormal. Finger-Nose-Finger Test abnormal and Heel to Shin Test abnormal. Romberg Test normal.      Gait: Gait abnormal and tandem walk abnormal.      Deep Tendon Reflexes: Reflexes normal.      Reflex Scores:       Tricep reflexes are 2+ on the right side and 2+ on the left side.       Bicep reflexes are 2+ on the right side and 2+ on the left side.       Brachioradialis reflexes are 2+ on the right side and 2+ on the left side.       Patellar reflexes are 2+ on the right side and 2+ on the left side.       Achilles reflexes are 2+ on the right side and 2+ on the left side.  Psychiatric:         Mood and Affect: Mood normal.          Speech: Speech normal.         Behavior: Behavior normal.          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 Need labs next visit  2. VNS device evaluated, no changes were made  3. Continue the lamictal and keppra as directed  4. Notify clinic if any further seizures, as will need to obtain labs    Seizure precautions:  1. Take medications as directed  2. Avoid open flames and hot surfaces  3. Avoid elevations  4. Avoid swimming alone  5. Make sure getting plenty of sleep  6. Avoid alcohol  7. No driving or operating heavy machinery for 6 months after last known seizure

## 2023-09-19 NOTE — PATIENT INSTRUCTIONS
1. Continue the current keppra and lamictal as dircted  2. VNS evaluation done, no changes in settings today  3. Continue the celexa 40mg daily  4. Continue the once monthly Aimovig injections as directed  5. Trough lamictal level      Seizure Precautions:  1. Take medications as directed  2. Avoid open flames and hot surfaces such as fires and grills  3. Avoid elevations such as ladders or stools  4. Avoid swimming alone  5. Make sure getting plenty of sleep, recommend 7-8 hours per day  6. Avoid alcohol and illicit drugs  7. No driving or operating heavy machinery for 6 months after last known seizure

## 2023-09-30 ENCOUNTER — EXTERNAL CHRONIC CARE MANAGEMENT (OUTPATIENT)
Dept: FAMILY MEDICINE | Facility: CLINIC | Age: 48
End: 2023-09-30
Payer: MEDICARE

## 2023-09-30 PROCEDURE — G0511 PR CHRONIC CARE MGMT, RHC OR FQHC ONLY, 20 MINS OR MORE: ICD-10-PCS | Mod: ,,, | Performed by: FAMILY MEDICINE

## 2023-09-30 PROCEDURE — G0511 CCM/BHI BY RHC/FQHC 20MIN MO: HCPCS | Mod: ,,, | Performed by: FAMILY MEDICINE

## 2023-10-20 ENCOUNTER — TELEPHONE (OUTPATIENT)
Dept: GASTROENTEROLOGY | Facility: CLINIC | Age: 48
End: 2023-10-20
Payer: MEDICARE

## 2023-10-20 NOTE — TELEPHONE ENCOUNTER
Received a secure chat message from Maryann Rajput LPN stating that step mother would like to know what is going to be done for patients hepatitis c and would like a call back. Patient was a no show for her follow up appointment. Attempted to call. Left message.

## 2023-10-31 ENCOUNTER — EXTERNAL CHRONIC CARE MANAGEMENT (OUTPATIENT)
Dept: FAMILY MEDICINE | Facility: CLINIC | Age: 48
End: 2023-10-31
Payer: MEDICARE

## 2023-10-31 PROCEDURE — G0511 CCM/BHI BY RHC/FQHC 20MIN MO: HCPCS | Mod: ,,, | Performed by: FAMILY MEDICINE

## 2023-10-31 PROCEDURE — G0511 PR CHRONIC CARE MGMT, RHC OR FQHC ONLY, 20 MINS OR MORE: ICD-10-PCS | Mod: ,,, | Performed by: FAMILY MEDICINE

## 2023-11-09 DIAGNOSIS — G44.229 CHRONIC TENSION-TYPE HEADACHE, NOT INTRACTABLE: ICD-10-CM

## 2023-11-30 ENCOUNTER — EXTERNAL CHRONIC CARE MANAGEMENT (OUTPATIENT)
Dept: FAMILY MEDICINE | Facility: CLINIC | Age: 48
End: 2023-11-30
Payer: MEDICARE

## 2023-11-30 DIAGNOSIS — G40.309 NONINTRACTABLE GENERALIZED IDIOPATHIC EPILEPSY WITHOUT STATUS EPILEPTICUS: Chronic | ICD-10-CM

## 2023-11-30 PROCEDURE — G0511 PR CHRONIC CARE MGMT, RHC OR FQHC ONLY, 20 MINS OR MORE: ICD-10-PCS | Mod: ,,, | Performed by: FAMILY MEDICINE

## 2023-11-30 PROCEDURE — G0511 CCM/BHI BY RHC/FQHC 20MIN MO: HCPCS | Mod: ,,, | Performed by: FAMILY MEDICINE

## 2023-11-30 RX ORDER — LEVETIRACETAM 1000 MG/1
1000 TABLET ORAL 2 TIMES DAILY
Qty: 270 TABLET | Refills: 1 | Status: SHIPPED | OUTPATIENT
Start: 2023-11-30 | End: 2024-03-19 | Stop reason: SDUPTHER

## 2023-12-10 ENCOUNTER — HOSPITAL ENCOUNTER (EMERGENCY)
Facility: HOSPITAL | Age: 48
Discharge: HOME | End: 2023-12-10
Payer: MEDICARE

## 2023-12-10 VITALS
WEIGHT: 143 LBS | HEIGHT: 62 IN | BODY MASS INDEX: 26.31 KG/M2 | RESPIRATION RATE: 9 BRPM | TEMPERATURE: 99 F | DIASTOLIC BLOOD PRESSURE: 64 MMHG | OXYGEN SATURATION: 97 % | HEART RATE: 73 BPM | SYSTOLIC BLOOD PRESSURE: 107 MMHG

## 2023-12-10 DIAGNOSIS — M25.511 ACUTE PAIN OF RIGHT SHOULDER: Primary | ICD-10-CM

## 2023-12-10 DIAGNOSIS — R52 PAIN: ICD-10-CM

## 2023-12-10 PROCEDURE — 99284 EMERGENCY DEPT VISIT MOD MDM: CPT

## 2023-12-10 PROCEDURE — 99284 EMERGENCY DEPT VISIT MOD MDM: CPT | Mod: ,,, | Performed by: REGISTERED NURSE

## 2023-12-10 PROCEDURE — 99284 PR EMERGENCY DEPT VISIT,LEVEL IV: ICD-10-PCS | Mod: ,,, | Performed by: REGISTERED NURSE

## 2023-12-10 PROCEDURE — 63600175 PHARM REV CODE 636 W HCPCS: Performed by: REGISTERED NURSE

## 2023-12-10 PROCEDURE — 96372 THER/PROPH/DIAG INJ SC/IM: CPT | Performed by: REGISTERED NURSE

## 2023-12-10 RX ORDER — ORPHENADRINE CITRATE 30 MG/ML
30 INJECTION INTRAMUSCULAR; INTRAVENOUS
Status: COMPLETED | OUTPATIENT
Start: 2023-12-10 | End: 2023-12-10

## 2023-12-10 RX ORDER — CYCLOBENZAPRINE HCL 10 MG
10 TABLET ORAL 3 TIMES DAILY PRN
Qty: 15 TABLET | Refills: 0 | Status: SHIPPED | OUTPATIENT
Start: 2023-12-10 | End: 2023-12-15

## 2023-12-10 RX ADMIN — ORPHENADRINE CITRATE 30 MG: 60 INJECTION INTRAMUSCULAR; INTRAVENOUS at 10:12

## 2023-12-10 NOTE — ED PROVIDER NOTES
Encounter Date: 12/10/2023       History     Chief Complaint   Patient presents with    Neck Pain    Shoulder Pain     Right shoulder pain      48-year-old female complaining of right shoulder pain this morning also complaining of upper back pain.  Mother at bedside reports the patient was attempting to bed last night and due to chronic condition her left leg is weak and he caused her follow up in the bed.  Patient denies falling on floor or any other trauma.  Pain described as a burning aching sensation worse with sitting up.  Denies any cervical pain with palpation past history of aneurysm with chronic weakness left upper and lower extremities.      Review of patient's allergies indicates:  No Known Allergies  Past Medical History:   Diagnosis Date    Aneurysm     Arthritis     Chronic migraine without aura, intractable, without status migrainosus     Epilepsy characterized by intractable complex partial seizures     GERD (gastroesophageal reflux disease)     Headache     Hydrocephalus     Hypertension      Past Surgical History:   Procedure Laterality Date    VAGUS NERVE STIMULATOR INSERTION      VENTRICULOPERITONEAL SHUNT       Family History   Problem Relation Age of Onset    Cancer Mother     Thyroid disease Mother     Hyperlipidemia Father     Breast cancer Maternal Aunt      Social History     Tobacco Use    Smoking status: Never     Passive exposure: Never    Smokeless tobacco: Never   Substance Use Topics    Alcohol use: Never    Drug use: Never     Review of Systems   Respiratory:  Negative for shortness of breath.    Cardiovascular:  Negative for chest pain.   Musculoskeletal:  Positive for back pain.   All other systems reviewed and are negative.      Physical Exam     Initial Vitals   BP Pulse Resp Temp SpO2   12/10/23 0856 12/10/23 0856 12/10/23 0856 12/10/23 0858 12/10/23 0856   120/61 74 10 99.2 °F (37.3 °C) 97 %      MAP       --                Physical Exam    Constitutional: She appears  well-developed and well-nourished. She is not diaphoretic.   HENT:   Head: Normocephalic.   Eyes: EOM are normal.   Cardiovascular:  Normal rate and intact distal pulses.           No murmur heard.  Pulmonary/Chest: Breath sounds normal. No respiratory distress.   Abdominal: Abdomen is soft. Bowel sounds are normal. She exhibits no distension. There is no abdominal tenderness.     Neurological: She is oriented to person, place, and time.   Skin: Skin is warm. Capillary refill takes less than 2 seconds.   Psychiatric: She has a normal mood and affect. Thought content normal.         Medical Screening Exam   See Full Note    ED Course   Procedures  Labs Reviewed - No data to display       Imaging Results              X-Ray Shoulder 1 View Right (Final result)  Result time 12/10/23 09:45:25      Final result by Taco Holbrook II, MD (12/10/23 09:45:25)                   Impression:      Mild shoulder osteoarthrosis no      Electronically signed by: Taco Holbrook  Date:    12/10/2023  Time:    09:45               Narrative:    EXAMINATION:  XR SHOULDER 1 VIEW RIGHT    CLINICAL HISTORY:  Pain, unspecified    COMPARISON:  None available    TECHNIQUE:  XR SHOULDER 1 VIEW RIGHT    FINDINGS:  No evidence of fracture seen.  The alignment of the joints appears normal.  Mild acromioclavicular and glenohumeral joint degenerative change is present.  Calcified and fracture of the ventriculoperitoneal shunt catheter.  No soft tissue abnormality is seen.                                       X-Ray Thoracic Spine AP Lateral (Final result)  Result time 12/10/23 09:47:13      Final result by Taco Holbrook II, MD (12/10/23 09:47:13)                   Impression:      No evidence of significant abnormality demonstrated      Electronically signed by: Taco Holbrook  Date:    12/10/2023  Time:    09:47               Narrative:    EXAMINATION:  XR THORACIC SPINE AP LATERAL    CLINICAL HISTORY:  Pain,  unspecified    COMPARISON:  [facility]    TECHNIQUE:  XR THORACIC SPINE AP LATERAL    FINDINGS:  No fracture is seen. Vertebral body heights and alignment are stable with mild dextroscoliosis.  The disc space heights are well-maintained.  No significant degenerative change is present.                                       Medications   orphenadrine injection 30 mg (30 mg Intramuscular Given 12/10/23 1011)     Medical Decision Making  Amount and/or Complexity of Data Reviewed  Radiology: ordered.    Risk  Prescription drug management.                                      Clinical Impression:   Final diagnoses:  [R52] Pain  [M25.511] Acute pain of right shoulder (Primary)        ED Disposition Condition    Discharge Stable          ED Prescriptions       Medication Sig Dispense Start Date End Date Auth. Provider    cyclobenzaprine (FLEXERIL) 10 MG tablet Take 1 tablet (10 mg total) by mouth 3 (three) times daily as needed for Muscle spasms. 15 tablet 12/10/2023 12/15/2023 Francois Boyd ACNP          Follow-up Information    None          Francois Boyd ACNP  12/10/23 6950

## 2023-12-10 NOTE — ED TRIAGE NOTES
PT STATES LAST NIGHT SHE COULD NOT LIFT HER LEFT LEG GOOD ENOUGH TO GET INTO THE BED AND FELL ONTO THE BED, PT STATES SHE DID NOT HIT ANYTHING BUT THE MATTRESS. PT STATES HER RIGHT SHOULDER WAS HURTING HER LAST NIGHT AND THIS MORNING HER REESE AND UPPER BACK WAS HURTING ALONG WITH HER RIGHT SHOULDER AND LEFT LEG. PT DENIES ISSUES WITH URINATING OR BOWEL ISSUES. PT DENIES SOB OR CHEST PAIN.

## 2023-12-31 ENCOUNTER — EXTERNAL CHRONIC CARE MANAGEMENT (OUTPATIENT)
Dept: FAMILY MEDICINE | Facility: CLINIC | Age: 48
End: 2023-12-31
Payer: MEDICARE

## 2023-12-31 PROCEDURE — G0511 CCM/BHI BY RHC/FQHC 20MIN MO: HCPCS | Mod: ,,, | Performed by: FAMILY MEDICINE

## 2024-01-05 ENCOUNTER — OFFICE VISIT (OUTPATIENT)
Dept: FAMILY MEDICINE | Facility: CLINIC | Age: 49
End: 2024-01-05
Payer: MEDICARE

## 2024-01-05 VITALS
BODY MASS INDEX: 22.68 KG/M2 | WEIGHT: 128 LBS | RESPIRATION RATE: 20 BRPM | TEMPERATURE: 99 F | HEART RATE: 81 BPM | SYSTOLIC BLOOD PRESSURE: 118 MMHG | OXYGEN SATURATION: 96 % | DIASTOLIC BLOOD PRESSURE: 70 MMHG | HEIGHT: 63 IN

## 2024-01-05 DIAGNOSIS — U07.1 COVID: Primary | ICD-10-CM

## 2024-01-05 DIAGNOSIS — R51.9 ACUTE NONINTRACTABLE HEADACHE, UNSPECIFIED HEADACHE TYPE: ICD-10-CM

## 2024-01-05 DIAGNOSIS — J34.89 NASAL DRAINAGE: ICD-10-CM

## 2024-01-05 DIAGNOSIS — G91.0 COMMUNICATING HYDROCEPHALUS: ICD-10-CM

## 2024-01-05 DIAGNOSIS — G40.309 NONINTRACTABLE GENERALIZED IDIOPATHIC EPILEPSY WITHOUT STATUS EPILEPTICUS: ICD-10-CM

## 2024-01-05 DIAGNOSIS — R05.9 COUGH, UNSPECIFIED TYPE: ICD-10-CM

## 2024-01-05 DIAGNOSIS — F33.1 MODERATE EPISODE OF RECURRENT MAJOR DEPRESSIVE DISORDER: ICD-10-CM

## 2024-01-05 LAB
CTP QC/QA: YES
SARS-COV-2 RDRP RESP QL NAA+PROBE: POSITIVE

## 2024-01-05 PROCEDURE — 99213 OFFICE O/P EST LOW 20 MIN: CPT | Mod: ,,, | Performed by: NURSE PRACTITIONER

## 2024-01-05 PROCEDURE — 87635 SARS-COV-2 COVID-19 AMP PRB: CPT | Mod: QW,,, | Performed by: NURSE PRACTITIONER

## 2024-01-05 RX ORDER — PROMETHAZINE HYDROCHLORIDE AND DEXTROMETHORPHAN HYDROBROMIDE 6.25; 15 MG/5ML; MG/5ML
5 SYRUP ORAL NIGHTLY PRN
Qty: 120 ML | Refills: 0 | Status: SHIPPED | OUTPATIENT
Start: 2024-01-05

## 2024-01-05 RX ORDER — KETOCONAZOLE 20 MG/G
CREAM TOPICAL 2 TIMES DAILY
COMMUNITY
Start: 2023-12-01

## 2024-01-05 RX ORDER — NIRMATRELVIR AND RITONAVIR 300-100 MG
KIT ORAL
Qty: 30 TABLET | Refills: 0 | Status: SHIPPED | OUTPATIENT
Start: 2024-01-05 | End: 2024-01-10

## 2024-01-05 NOTE — PROGRESS NOTES
"Subjective:       Brian Duong is a 48 y.o. female who presents for evaluation of symptoms of a URI. Symptoms include achiness, congestion, cough described as productive, headache described as dull, sore throat, and fatgiue . Onset of symptoms was 3 days ago, and has been gradually worsening since that time. Treatment to date: cough suppressants.    Review of Systems  Pertinent items are noted in HPI.     Objective:      /70   Pulse 81   Temp 98.6 °F (37 °C)   Resp 20   Ht 5' 3" (1.6 m)   Wt 58.1 kg (128 lb)   SpO2 96%   BMI 22.67 kg/m²   General appearance: alert, appears stated age, and cooperative  Head: Normocephalic, without obvious abnormality, atraumatic  Eyes: conjunctivae/corneas clear. PERRL, EOM's intact. Fundi benign.  Ears: abnormal TM right ear - dull and abnormal TM left ear - dull  Nose: Nares normal. Septum midline. Mucosa normal. No drainage or sinus tenderness., mild congestion  Throat: abnormal findings: PND  Lungs: clear to auscultation bilaterally  Heart: regular rate and rhythm, S1, S2 normal, no murmur, click, rub or gallop  Extremities: extremities normal, atraumatic, no cyanosis or edema  Skin: Skin color, texture, turgor normal. No rashes or lesions  Lymph nodes: Cervical, supraclavicular, and axillary nodes normal.  Neurologic: Alert and oriented X 3, normal strength and tone. Normal symmetric reflexes. Normal coordination and gait     Assessment:      COVID     Plan:      Discussed diagnosis and treatment of URI.  Suggested symptomatic OTC remedies.  Nasal saline spray for congestion.  Follow up as needed.  Take paxlovid as prescribed.   "

## 2024-01-31 ENCOUNTER — EXTERNAL CHRONIC CARE MANAGEMENT (OUTPATIENT)
Dept: FAMILY MEDICINE | Facility: CLINIC | Age: 49
End: 2024-01-31
Payer: MEDICARE

## 2024-01-31 PROCEDURE — G0511 CCM/BHI BY RHC/FQHC 20MIN MO: HCPCS | Mod: ,,, | Performed by: FAMILY MEDICINE

## 2024-02-01 DIAGNOSIS — K21.9 GASTROESOPHAGEAL REFLUX DISEASE, UNSPECIFIED WHETHER ESOPHAGITIS PRESENT: ICD-10-CM

## 2024-02-01 RX ORDER — OMEPRAZOLE 20 MG/1
20 CAPSULE, DELAYED RELEASE ORAL
Qty: 90 CAPSULE | Refills: 3 | Status: SHIPPED | OUTPATIENT
Start: 2024-02-01

## 2024-02-14 DIAGNOSIS — F33.1 MODERATE EPISODE OF RECURRENT MAJOR DEPRESSIVE DISORDER: Chronic | ICD-10-CM

## 2024-02-14 RX ORDER — ARIPIPRAZOLE 5 MG/1
5 TABLET ORAL DAILY
Qty: 90 TABLET | Refills: 3 | Status: SHIPPED | OUTPATIENT
Start: 2024-02-14 | End: 2025-02-13

## 2024-02-19 ENCOUNTER — HOSPITAL ENCOUNTER (EMERGENCY)
Facility: HOSPITAL | Age: 49
Discharge: HOME | End: 2024-02-19
Attending: EMERGENCY MEDICINE
Payer: MEDICARE

## 2024-02-19 VITALS
HEIGHT: 63 IN | RESPIRATION RATE: 16 BRPM | OXYGEN SATURATION: 100 % | SYSTOLIC BLOOD PRESSURE: 112 MMHG | DIASTOLIC BLOOD PRESSURE: 68 MMHG | TEMPERATURE: 99 F | WEIGHT: 152 LBS | BODY MASS INDEX: 26.93 KG/M2 | HEART RATE: 67 BPM

## 2024-02-19 DIAGNOSIS — T85.01XA: Primary | ICD-10-CM

## 2024-02-19 DIAGNOSIS — R53.1 WEAKNESS: ICD-10-CM

## 2024-02-19 DIAGNOSIS — M54.32 SCIATICA OF LEFT SIDE: ICD-10-CM

## 2024-02-19 LAB
ALBUMIN SERPL BCP-MCNC: 3 G/DL (ref 3.5–5)
ALBUMIN/GLOB SERPL: 0.8 {RATIO}
ALP SERPL-CCNC: 107 U/L (ref 39–100)
ALT SERPL W P-5'-P-CCNC: 31 U/L (ref 13–56)
ANION GAP SERPL CALCULATED.3IONS-SCNC: 6 MMOL/L (ref 7–16)
APTT PPP: 31.3 SECONDS (ref 25.2–37.3)
AST SERPL W P-5'-P-CCNC: 37 U/L (ref 15–37)
B-HCG UR QL: NEGATIVE
BASOPHILS # BLD AUTO: 0.05 K/UL (ref 0–0.2)
BASOPHILS NFR BLD AUTO: 0.8 % (ref 0–1)
BILIRUB SERPL-MCNC: 0.5 MG/DL (ref ?–1.2)
BILIRUB UR QL STRIP: NEGATIVE
BUN SERPL-MCNC: 12 MG/DL (ref 7–18)
BUN/CREAT SERPL: 14 (ref 6–20)
CALCIUM SERPL-MCNC: 8.8 MG/DL (ref 8.5–10.1)
CHLORIDE SERPL-SCNC: 108 MMOL/L (ref 98–107)
CLARITY UR: CLEAR
CO2 SERPL-SCNC: 31 MMOL/L (ref 21–32)
COLOR UR: YELLOW
CREAT SERPL-MCNC: 0.83 MG/DL (ref 0.55–1.02)
CTP QC/QA: YES
DIFFERENTIAL METHOD BLD: ABNORMAL
EGFR (NO RACE VARIABLE) (RUSH/TITUS): 88 ML/MIN/1.73M2
EOSINOPHIL # BLD AUTO: 0.03 K/UL (ref 0–0.5)
EOSINOPHIL NFR BLD AUTO: 0.5 % (ref 1–4)
ERYTHROCYTE [DISTWIDTH] IN BLOOD BY AUTOMATED COUNT: 12 % (ref 11.5–14.5)
GLOBULIN SER-MCNC: 3.9 G/DL (ref 2–4)
GLUCOSE SERPL-MCNC: 96 MG/DL (ref 74–106)
GLUCOSE UR STRIP-MCNC: NORMAL MG/DL
HCT VFR BLD AUTO: 38.4 % (ref 38–47)
HGB BLD-MCNC: 12.4 G/DL (ref 12–16)
IMM GRANULOCYTES # BLD AUTO: 0.02 K/UL (ref 0–0.04)
IMM GRANULOCYTES NFR BLD: 0.3 % (ref 0–0.4)
INR BLD: 0.91
KETONES UR STRIP-SCNC: NEGATIVE MG/DL
LEUKOCYTE ESTERASE UR QL STRIP: NEGATIVE
LYMPHOCYTES # BLD AUTO: 1.7 K/UL (ref 1–4.8)
LYMPHOCYTES NFR BLD AUTO: 28.4 % (ref 27–41)
MAGNESIUM SERPL-MCNC: 1.8 MG/DL (ref 1.7–2.3)
MCH RBC QN AUTO: 30.7 PG (ref 27–31)
MCHC RBC AUTO-ENTMCNC: 32.3 G/DL (ref 32–36)
MCV RBC AUTO: 95 FL (ref 80–96)
MONOCYTES # BLD AUTO: 0.54 K/UL (ref 0–0.8)
MONOCYTES NFR BLD AUTO: 9 % (ref 2–6)
MPC BLD CALC-MCNC: 9 FL (ref 9.4–12.4)
MUCOUS, UA: ABNORMAL /LPF
NEUTROPHILS # BLD AUTO: 3.65 K/UL (ref 1.8–7.7)
NEUTROPHILS NFR BLD AUTO: 61 % (ref 53–65)
NITRITE UR QL STRIP: NEGATIVE
NRBC # BLD AUTO: 0 X10E3/UL
NRBC, AUTO (.00): 0 %
NT-PROBNP SERPL-MCNC: 270 PG/ML (ref 1–125)
PH UR STRIP: 6.5 PH UNITS
PLATELET # BLD AUTO: 311 K/UL (ref 150–400)
POTASSIUM SERPL-SCNC: 4.5 MMOL/L (ref 3.5–5.1)
PROT SERPL-MCNC: 6.9 G/DL (ref 6.4–8.2)
PROT UR QL STRIP: NEGATIVE
PROTHROMBIN TIME: 12.2 SECONDS (ref 11.7–14.7)
RBC # BLD AUTO: 4.04 M/UL (ref 4.2–5.4)
RBC # UR STRIP: ABNORMAL /UL
RBC #/AREA URNS HPF: 36 /HPF
SODIUM SERPL-SCNC: 140 MMOL/L (ref 136–145)
SP GR UR STRIP: 1.02
SQUAMOUS #/AREA URNS LPF: ABNORMAL /HPF
TROPONIN I SERPL DL<=0.01 NG/ML-MCNC: <4 PG/ML
UROBILINOGEN UR STRIP-ACNC: 2 MG/DL
WBC # BLD AUTO: 5.99 K/UL (ref 4.5–11)
WBC #/AREA URNS HPF: 4 /HPF

## 2024-02-19 PROCEDURE — 85610 PROTHROMBIN TIME: CPT | Performed by: NURSE PRACTITIONER

## 2024-02-19 PROCEDURE — 84484 ASSAY OF TROPONIN QUANT: CPT | Performed by: NURSE PRACTITIONER

## 2024-02-19 PROCEDURE — 99285 EMERGENCY DEPT VISIT HI MDM: CPT | Mod: 25

## 2024-02-19 PROCEDURE — 96361 HYDRATE IV INFUSION ADD-ON: CPT

## 2024-02-19 PROCEDURE — 81003 URINALYSIS AUTO W/O SCOPE: CPT | Performed by: NURSE PRACTITIONER

## 2024-02-19 PROCEDURE — 85025 COMPLETE CBC W/AUTO DIFF WBC: CPT | Performed by: NURSE PRACTITIONER

## 2024-02-19 PROCEDURE — 81025 URINE PREGNANCY TEST: CPT | Performed by: NURSE PRACTITIONER

## 2024-02-19 PROCEDURE — 63600175 PHARM REV CODE 636 W HCPCS: Performed by: NURSE PRACTITIONER

## 2024-02-19 PROCEDURE — 80053 COMPREHEN METABOLIC PANEL: CPT | Performed by: NURSE PRACTITIONER

## 2024-02-19 PROCEDURE — 99284 EMERGENCY DEPT VISIT MOD MDM: CPT | Mod: ,,, | Performed by: NURSE PRACTITIONER

## 2024-02-19 PROCEDURE — 96374 THER/PROPH/DIAG INJ IV PUSH: CPT

## 2024-02-19 PROCEDURE — 25000003 PHARM REV CODE 250: Performed by: NURSE PRACTITIONER

## 2024-02-19 PROCEDURE — 85730 THROMBOPLASTIN TIME PARTIAL: CPT | Performed by: NURSE PRACTITIONER

## 2024-02-19 PROCEDURE — 83880 ASSAY OF NATRIURETIC PEPTIDE: CPT | Performed by: NURSE PRACTITIONER

## 2024-02-19 PROCEDURE — 83735 ASSAY OF MAGNESIUM: CPT | Performed by: NURSE PRACTITIONER

## 2024-02-19 RX ORDER — ORPHENADRINE CITRATE 30 MG/ML
60 INJECTION INTRAMUSCULAR; INTRAVENOUS
Status: DISCONTINUED | OUTPATIENT
Start: 2024-02-19 | End: 2024-02-19

## 2024-02-19 RX ORDER — IBUPROFEN 800 MG/1
800 TABLET ORAL 3 TIMES DAILY
Qty: 20 TABLET | Refills: 0 | Status: SHIPPED | OUTPATIENT
Start: 2024-02-19

## 2024-02-19 RX ORDER — ORPHENADRINE CITRATE 30 MG/ML
30 INJECTION INTRAMUSCULAR; INTRAVENOUS
Status: COMPLETED | OUTPATIENT
Start: 2024-02-19 | End: 2024-02-19

## 2024-02-19 RX ORDER — TIZANIDINE 4 MG/1
4 TABLET ORAL EVERY 6 HOURS PRN
Qty: 20 TABLET | Refills: 0 | Status: SHIPPED | OUTPATIENT
Start: 2024-02-19 | End: 2024-02-29

## 2024-02-19 RX ADMIN — SODIUM CHLORIDE 1000 ML: 9 INJECTION, SOLUTION INTRAVENOUS at 03:02

## 2024-02-19 RX ADMIN — ORPHENADRINE CITRATE 30 MG: 60 INJECTION INTRAMUSCULAR; INTRAVENOUS at 06:02

## 2024-02-19 NOTE — ED PROVIDER NOTES
"Encounter Date: 2/19/2024       History     Chief Complaint   Patient presents with    Extremity Weakness     Patient presents to the ER with complaint of left leg pain.  Patient reports she fell yesterday when she was trying to get into the [employer] device.  She states she went down onto her left knee.  She states she fell because it felt like her left leg "gave out." She states she was unable to reach with her left arm.  She states both her left arm and leg has felt weak after the fall.  She states she went home and thought she was going to be okay.  She states when she got up this morning she was able to walk around with her walker.  She states her left side again became very weak and she was to sit down.  She was said at that time she was very weak and conscious slumped over.  LOC is unknown, the incident was not witnessed.  She was brought to the ER by family member.  Patient has history of hydrocephalus which required a shunt.  She also has a vagal nerve stimulator.  She was history of migraines and epilepsy.  She also has history of hypertension.    The history is provided by the patient and a relative. No  was used.     Review of patient's allergies indicates:  No Known Allergies  Past Medical History:   Diagnosis Date    Aneurysm     Arthritis     Chronic migraine without aura, intractable, without status migrainosus     Epilepsy characterized by intractable complex partial seizures     GERD (gastroesophageal reflux disease)     Headache     Hydrocephalus     Hypertension      Past Surgical History:   Procedure Laterality Date    VAGUS NERVE STIMULATOR INSERTION      VENTRICULOPERITONEAL SHUNT       Family History   Problem Relation Age of Onset    Cancer Mother     Thyroid disease Mother     Hyperlipidemia Father     Breast cancer Maternal Aunt      Social History     Tobacco Use    Smoking status: Never     Passive exposure: Never    Smokeless tobacco: Never   Substance Use Topics    " Alcohol use: Never    Drug use: Never     Review of Systems   Constitutional:  Positive for activity change and fatigue.   Musculoskeletal:  Positive for myalgias.   Neurological:  Positive for syncope (unwitnessed and unknown duration), weakness (Left arm and leg) and light-headedness.   All other systems reviewed and are negative.      Physical Exam     Initial Vitals [02/19/24 1328]   BP Pulse Resp Temp SpO2   109/68 98 18 98.4 °F (36.9 °C) 98 %      MAP       --         Physical Exam    Nursing note and vitals reviewed.  Constitutional: She appears well-developed and well-nourished.   HENT:   Head: Normocephalic.   Right Ear: External ear normal.   Left Ear: External ear normal.   Nose: Nose normal.   Mouth/Throat: Oropharynx is clear and moist.   Eyes: Conjunctivae and EOM are normal. Pupils are equal, round, and reactive to light.   Neck: Neck supple.   Normal range of motion.  Cardiovascular:  Normal rate, regular rhythm, normal heart sounds and intact distal pulses.           Pulmonary/Chest: Breath sounds normal.   Abdominal: Abdomen is soft. Bowel sounds are normal.   Musculoskeletal:         General: Edema (1+ edema bilateral lower extremities) present.      Cervical back: Normal range of motion and neck supple.     Neurological: She is alert and oriented to person, place, and time. GCS score is 15. GCS eye subscore is 4. GCS verbal subscore is 5. GCS motor subscore is 6.   Skin: Skin is warm and dry. Capillary refill takes less than 2 seconds.   Psychiatric: She has a normal mood and affect. Her behavior is normal. Judgment and thought content normal.         Medical Screening Exam   See Full Note    ED Course   Procedures  Labs Reviewed   COMPREHENSIVE METABOLIC PANEL - Abnormal; Notable for the following components:       Result Value    Chloride 108 (*)     Anion Gap 6 (*)     Albumin 3.0 (*)     Alk Phos 107 (*)     All other components within normal limits   NT-PRO NATRIURETIC PEPTIDE - Abnormal;  Notable for the following components:    ProBNP 270 (*)     All other components within normal limits   URINALYSIS, REFLEX TO URINE CULTURE - Abnormal; Notable for the following components:    Urobilinogen, UA 2 (*)     Blood, UA Small (*)     All other components within normal limits   CBC WITH DIFFERENTIAL - Abnormal; Notable for the following components:    RBC 4.04 (*)     MPV 9.0 (*)     Monocytes % 9.0 (*)     Eosinophils % 0.5 (*)     All other components within normal limits   URINALYSIS, MICROSCOPIC - Abnormal; Notable for the following components:    RBC, UA 36 (*)     Squamous Epithelial Cells, UA Occasional (*)     Mucous Occasional (*)     All other components within normal limits   APTT - Normal   MAGNESIUM - Normal   TROPONIN I - Normal   PROTIME-INR - Normal   CBC W/ AUTO DIFFERENTIAL    Narrative:     The following orders were created for panel order CBC auto differential.  Procedure                               Abnormality         Status                     ---------                               -----------         ------                     CBC with Differential[[id number]]       Abnormal            Final result                 Please view results for these tests on the individual orders.   POCT URINE PREGNANCY          Imaging Results              CT Head Without Contrast (Final result)  Result time 02/19/24 15:53:04      Final result by William Beavers DO (02/19/24 15:53:04)                   Impression:      No acute CT findings.  Similar appearance to prior.    The CT exam was performed using one or more of the following dose    reduction techniques- Automated exposure control, adjustment of the mA    and/or kV according to patient size, and/or use of iterative    reconstructed technique.    Point of Service: [facility]      Electronically signed by: William Beavers  Date:    02/19/2024  Time:    15:53               Narrative:    EXAMINATION:  CT HEAD WITHOUT CONTRAST    CLINICAL  HISTORY:  Syncope, recurrent;Dizziness, persistent/recurrent, cardiac or vascular cause suspected;    COMPARISON:  CT brain March 20, 2020    TECHNIQUE:  Multiple axial tomographic images of the brain were obtained without the use of intravenous contrast.    FINDINGS:  Similar right parieto-occipital approach ventriculostomy catheters.  Similar metallic density within the periatrial region consistent with foreign bodies.  Similar parieto-occipital encephalomalacia.  No convincing evidence of hydrocephalus.  Midline structures are nondisplaced.  No convincing evidence of acute intracranial hemorrhage.  Visualized paranasal sinuses and mastoid air cells are predominantly clear.                                       X-Ray Knee 1 or 2 View Left (Final result)  Result time 02/19/24 15:51:32      Final result by Darryl Nelson MD (02/19/24 15:51:32)                   Impression:      No acute findings.      Electronically signed by: Darryl Nelson  Date:    02/19/2024  Time:    15:51               Narrative:    EXAMINATION:  XR KNEE 1 OR 2 VIEW LEFT    CLINICAL HISTORY:  left knee pain;    TECHNIQUE:  One or two views of the left knee were performed.    COMPARISON:  None    FINDINGS:  There is no fracture or dislocation detected.  Joint space fairly well maintained.  Soft tissues are unremarkable.                                       X-Ray Chest PA And Lateral (Final result)  Result time 02/19/24 15:35:36      Final result by Darryl Nelson MD (02/19/24 15:35:36)                   Impression:      No acute findings.      Electronically signed by: Darryl Nelson  Date:    02/19/2024  Time:    15:35               Narrative:    EXAMINATION:  XR CHEST PA AND LATERAL    CLINICAL HISTORY:  Weakness    TECHNIQUE:  PA and lateral views of the chest were performed.    COMPARISON:  08/05/2022    FINDINGS:  There is a left chest wall stimulator device with leads extending superiorly into the left neck.  There is a fractured similar appearing  "shunt catheter tubing seen of the right neck with areas of calcification over the right chest and into the right upper quadrant similar as well.  The heart size is normal.  Lungs are clear.  No pneumothorax or pleural effusion.                                       Medications   sodium chloride 0.9% bolus 1,000 mL 1,000 mL (0 mLs Intravenous Stopped 2/19/24 1607)   orphenadrine injection 30 mg (30 mg Intravenous Given 2/19/24 1841)     Medical Decision Making  Patient presents to the ER with complaint of left leg pain.  Patient reports she fell yesterday when she was trying to get into the [employer] device.  She states she went down onto her left knee.  She states she fell because it felt like her left leg "gave out." She states she was unable to reach with her left arm.  She states both her left arm and leg has felt weak after the fall.  She states she went home and thought she was going to be okay.  She states when she got up this morning she was able to walk around with her walker.  She states her left side again became very weak and she was to sit down.  She was said at that time she was very weak and conscious slumped over.  LOC is unknown, the incident was not witnessed.  She was brought to the ER by family member.  Patient has history of hydrocephalus which required a shunt.  She also has a vagal nerve stimulator.  She was history of migraines and epilepsy.  She also has history of hypertension.      Amount and/or Complexity of Data Reviewed  Labs: ordered. Decision-making details documented in ED Course.  Radiology: ordered. Decision-making details documented in ED Course.  ECG/medicine tests: ordered. Decision-making details documented in ED Course.  Discussion of management or test interpretation with external provider(s): Initiate IV, collect lab work  Patient was discussed with Dr. Warner who is also assisted in the medical management of this patient.    CT scan and chest x-ray reviewed with Dr. Sagastume.  " Chest x-ray reveals a fracture in shunt from patient was brain to her abdomen.  This was also noted on x-ray in December of 2023.  CT scan of head :  No new changes similar to previous scan.    Patient was discharged home with diagnosis of weakness, malfunction of cerebral ventricle shunt and sciatica of the left side.  She was given a prescription for tizanidine and ibuprofen.  She was instructed to follow up with Dr. See in 2 days for recheck.  She was told to schedule follow up appointment in [facility]rology Clinic for further recommendations regarding shunt.  Patient verbalizes understanding along with her mother and stepmother.    Risk  Prescription drug management.    ,                                  Clinical Impression:   Final diagnoses:  [R53.1] Weakness  [M54.32] Sciatica of left side  [T85.01XA] Malfunction of cerebral ventricular shunt, initial encounter (Primary)        ED Disposition Condition    Discharge Stable          ED Prescriptions       Medication Sig Dispense Start Date End Date Auth. Provider    tiZANidine (ZANAFLEX) 4 MG tablet Take 1 tablet (4 mg total) by mouth every 6 (six) hours as needed (muscle spasms). 20 tablet 2/19/2024 2/29/2024 Noemi Trimble FNP    ibuprofen (ADVIL,MOTRIN) 800 MG tablet Take 1 tablet (800 mg total) by mouth 3 (three) times daily. 20 tablet 2/19/2024 -- Noemi Trimble FNP          Follow-up Information       Follow up With Specialties Details Why Contact Info    Anna See MD Family Medicine Schedule an appointment as soon as possible for a visit in 2 days If symptoms worsen [address] [facility]  [facility] [address]  [phone]               Noemi Trimble FNP  02/20/24 3845

## 2024-02-19 NOTE — ED TRIAGE NOTES
"Pt c/o left leg "going out" since yesterday. Reports generalized weakness and frequent falls for months now.   "

## 2024-02-20 NOTE — DISCHARGE INSTRUCTIONS
Take medication as prescribed.  Alternate heat and ice compresses for comfort of the left lower back and leg.  Follow up with the primary care provider for further recommendations for shunt malfunction.  Return to the ER with new or worsening symptoms.

## 2024-02-29 ENCOUNTER — EXTERNAL CHRONIC CARE MANAGEMENT (OUTPATIENT)
Dept: FAMILY MEDICINE | Facility: CLINIC | Age: 49
End: 2024-02-29
Payer: MEDICARE

## 2024-02-29 PROCEDURE — G0511 CCM/BHI BY RHC/FQHC 20MIN MO: HCPCS | Mod: ,,, | Performed by: FAMILY MEDICINE

## 2024-03-04 ENCOUNTER — OFFICE VISIT (OUTPATIENT)
Dept: FAMILY MEDICINE | Facility: CLINIC | Age: 49
End: 2024-03-04
Payer: MEDICARE

## 2024-03-04 VITALS
SYSTOLIC BLOOD PRESSURE: 104 MMHG | HEIGHT: 63 IN | TEMPERATURE: 98 F | BODY MASS INDEX: 25.69 KG/M2 | RESPIRATION RATE: 18 BRPM | HEART RATE: 77 BPM | DIASTOLIC BLOOD PRESSURE: 68 MMHG | WEIGHT: 145 LBS | OXYGEN SATURATION: 98 %

## 2024-03-04 DIAGNOSIS — E78.2 MIXED HYPERLIPIDEMIA: ICD-10-CM

## 2024-03-04 DIAGNOSIS — E55.9 VITAMIN D DEFICIENCY: ICD-10-CM

## 2024-03-04 DIAGNOSIS — R76.8 HEPATITIS C ANTIBODY TEST POSITIVE: Primary | Chronic | ICD-10-CM

## 2024-03-04 DIAGNOSIS — F41.9 ANXIETY: Chronic | ICD-10-CM

## 2024-03-04 DIAGNOSIS — F33.1 MODERATE EPISODE OF RECURRENT MAJOR DEPRESSIVE DISORDER: Chronic | ICD-10-CM

## 2024-03-04 PROCEDURE — 99214 OFFICE O/P EST MOD 30 MIN: CPT | Mod: ,,, | Performed by: FAMILY MEDICINE

## 2024-03-04 RX ORDER — CITALOPRAM 40 MG/1
40 TABLET, FILM COATED ORAL DAILY
Qty: 90 TABLET | Refills: 3 | Status: SHIPPED | OUTPATIENT
Start: 2024-03-04

## 2024-03-10 NOTE — PROGRESS NOTES
Subjective     Patient ID: Brian Duong is a 48 y.o. female.    Chief Complaint: 6 month check up, Headache, Problem with her lags, and Fall    49 yo WF here for check up and lab. Has been lost to follow up on her Hep C treatment because of GI doctor leaving. Needs to get someone new.     Headache   Pertinent negatives include no abdominal pain.   Fall  Associated symptoms include headaches. Pertinent negatives include no abdominal pain.     Review of Systems   Constitutional:  Negative for activity change.   Eyes:  Negative for visual disturbance.   Respiratory:  Negative for shortness of breath.    Cardiovascular:  Negative for chest pain.   Gastrointestinal:  Negative for abdominal pain.   Neurological:  Positive for headaches.   Psychiatric/Behavioral:  Negative for dysphoric mood.      No visits with results within 1 Week(s) from this visit.   Latest known visit with results is:   Admission on 02/19/2024, Discharged on 02/19/2024   Component Date Value Ref Range Status    PTT 02/19/2024 31.3  25.2 - 37.3 seconds Final    Sodium 02/19/2024 140  136 - 145 mmol/L Final    Potassium 02/19/2024 4.5  3.5 - 5.1 mmol/L Final    Chloride 02/19/2024 108 (H)  98 - 107 mmol/L Final    CO2 02/19/2024 31  21 - 32 mmol/L Final    Anion Gap 02/19/2024 6 (L)  7 - 16 mmol/L Final    Glucose 02/19/2024 96  74 - 106 mg/dL Final    BUN 02/19/2024 12  7 - 18 mg/dL Final    Creatinine 02/19/2024 0.83  0.55 - 1.02 mg/dL Final    BUN/Creatinine Ratio 02/19/2024 14  6 - 20 Final    Calcium 02/19/2024 8.8  8.5 - 10.1 mg/dL Final    Total Protein 02/19/2024 6.9  6.4 - 8.2 g/dL Final    Albumin 02/19/2024 3.0 (L)  3.5 - 5.0 g/dL Final    Globulin 02/19/2024 3.9  2.0 - 4.0 g/dL Final    A/G Ratio 02/19/2024 0.8   Final    Bilirubin, Total 02/19/2024 0.5  >0.0 - 1.2 mg/dL Final    Alk Phos 02/19/2024 107 (H)  39 - 100 U/L Final    ALT 02/19/2024 31  13 - 56 U/L Final    AST 02/19/2024 37  15 - 37 U/L Final    eGFR 02/19/2024 88  >=60  mL/min/1.73m2 Final    ProBNP 02/19/2024 270 (H)  1 - 125 pg/mL Final    Magnesium 02/19/2024 1.8  1.7 - 2.3 mg/dL Final    Troponin I High Sensitivity 02/19/2024 <4.0  <=60.4 pg/mL Final    PT 02/19/2024 12.2  11.7 - 14.7 seconds Final    INR 02/19/2024 0.91  <=3.30 Final    Color, UA 02/19/2024 Yellow  Colorless, Straw, Yellow, Light Yellow, Dark Yellow Final    Clarity, UA 02/19/2024 Clear  Clear Final    pH, UA 02/19/2024 6.5  5.0 to 8.0 pH Units Final    Leukocytes, UA 02/19/2024 Negative  Negative Final    Nitrites, UA 02/19/2024 Negative  Negative Final    Protein, UA 02/19/2024 Negative  Negative Final    Glucose, UA 02/19/2024 Normal  Normal mg/dL Final    Ketones, UA 02/19/2024 Negative  Negative mg/dL Final    Urobilinogen, UA 02/19/2024 2 (A)  0.2, 1.0, Normal mg/dL Final    Bilirubin, UA 02/19/2024 Negative  Negative Final    Blood, UA 02/19/2024 Small (A)  Negative Final    Specific Gravity, UA 02/19/2024 1.020  <=1.030 Final    POC Preg Test, Ur 02/19/2024 Negative  Negative Final     Acceptable 02/19/2024 Yes   Final    WBC 02/19/2024 5.99  4.50 - 11.00 K/uL Final    RBC 02/19/2024 4.04 (L)  4.20 - 5.40 M/uL Final    Hemoglobin 02/19/2024 12.4  12.0 - 16.0 g/dL Final    Hematocrit 02/19/2024 38.4  38.0 - 47.0 % Final    MCV 02/19/2024 95.0  80.0 - 96.0 fL Final    MCH 02/19/2024 30.7  27.0 - 31.0 pg Final    MCHC 02/19/2024 32.3  32.0 - 36.0 g/dL Final    RDW 02/19/2024 12.0  11.5 - 14.5 % Final    Platelet Count 02/19/2024 311  150 - 400 K/uL Final    MPV 02/19/2024 9.0 (L)  9.4 - 12.4 fL Final    Neutrophils % 02/19/2024 61.0  53.0 - 65.0 % Final    Lymphocytes % 02/19/2024 28.4  27.0 - 41.0 % Final    Monocytes % 02/19/2024 9.0 (H)  2.0 - 6.0 % Final    Eosinophils % 02/19/2024 0.5 (L)  1.0 - 4.0 % Final    Basophils % 02/19/2024 0.8  0.0 - 1.0 % Final    Immature Granulocytes % 02/19/2024 0.3  0.0 - 0.4 % Final    nRBC, Auto 02/19/2024 0.0  <=0.0 % Final    Neutrophils, Abs  "02/19/2024 3.65  1.80 - 7.70 K/uL Final    Lymphocytes, Absolute 02/19/2024 1.70  1.00 - 4.80 K/uL Final    Monocytes, Absolute 02/19/2024 0.54  0.00 - 0.80 K/uL Final    Eosinophils, Absolute 02/19/2024 0.03  0.00 - 0.50 K/uL Final    Basophils, Absolute 02/19/2024 0.05  0.00 - 0.20 K/uL Final    Immature Granulocytes, Absolute 02/19/2024 0.02  0.00 - 0.04 K/uL Final    nRBC, Absolute 02/19/2024 0.00  <=0.00 x10e3/uL Final    Diff Type 02/19/2024 Auto   Final    WBC, UA 02/19/2024 4  <=5 /hpf Final    RBC, UA 02/19/2024 36 (H)  <=3 /hpf Final    Squamous Epithelial Cells, UA 02/19/2024 Occasional (A)  None Seen /HPF Final    Mucous 02/19/2024 Occasional (A)  None Seen /LPF Final          Objective   Blood pressure 104/68, pulse 77, temperature 98.2 °F (36.8 °C), temperature source Oral, resp. rate 18, height 5' 3" (1.6 m), weight 65.8 kg (145 lb), last menstrual period 02/19/2024, SpO2 98 %.    Physical Exam  Constitutional:       Appearance: Normal appearance.   HENT:      Head: Normocephalic and atraumatic.      Nose: Nose normal.      Mouth/Throat:      Mouth: Mucous membranes are moist.   Eyes:      Pupils: Pupils are equal, round, and reactive to light.   Cardiovascular:      Rate and Rhythm: Normal rate and regular rhythm.      Pulses: Normal pulses.      Heart sounds: Normal heart sounds.   Pulmonary:      Effort: Pulmonary effort is normal.      Breath sounds: Normal breath sounds.   Abdominal:      General: Abdomen is flat.      Palpations: Abdomen is soft.   Skin:     General: Skin is warm and dry.   Neurological:      Mental Status: She is alert and oriented to person, place, and time. Mental status is at baseline.      Motor: Weakness present.      Coordination: Coordination abnormal.      Gait: Gait abnormal.   Psychiatric:         Mood and Affect: Mood normal.         Behavior: Behavior normal.         Thought Content: Thought content normal.         Judgment: Judgment normal.            Assessment " and Plan     1. Hepatitis C antibody test positive  -     Ambulatory referral/consult to Gastroenterology; Future; Expected date: 03/11/2024    2. Anxiety  -     citalopram (CELEXA) 40 MG tablet; Take 1 tablet (40 mg total) by mouth once daily.  Dispense: 90 tablet; Refill: 3    3. Moderate episode of recurrent major depressive disorder  -     citalopram (CELEXA) 40 MG tablet; Take 1 tablet (40 mg total) by mouth once daily.  Dispense: 90 tablet; Refill: 3    4. Mixed hyperlipidemia  -     Lipid Panel; Future; Expected date: 10/02/2024  -     TSH; Future; Expected date: 10/02/2024    5. Vitamin D deficiency  -     Vitamin D; Future; Expected date: 10/02/2024        RTC prn.          Follow up in about 30 weeks (around 9/30/2024) for for check up and fasting lab and flu shot. (lipid, TSH and vitamin D).

## 2024-03-19 ENCOUNTER — OFFICE VISIT (OUTPATIENT)
Dept: NEUROLOGY | Facility: CLINIC | Age: 49
End: 2024-03-19
Payer: MEDICARE

## 2024-03-19 VITALS
SYSTOLIC BLOOD PRESSURE: 108 MMHG | HEIGHT: 63 IN | BODY MASS INDEX: 25.34 KG/M2 | OXYGEN SATURATION: 97 % | WEIGHT: 143 LBS | DIASTOLIC BLOOD PRESSURE: 58 MMHG | HEART RATE: 69 BPM

## 2024-03-19 DIAGNOSIS — G40.309 NONINTRACTABLE GENERALIZED IDIOPATHIC EPILEPSY WITHOUT STATUS EPILEPTICUS: Primary | ICD-10-CM

## 2024-03-19 DIAGNOSIS — Z45.42 ENCOUNTER FOR FITTING AND ADJUSTMENT OF VAGUS NERVE STIMULATOR: ICD-10-CM

## 2024-03-19 DIAGNOSIS — F32.89 OTHER DEPRESSION: ICD-10-CM

## 2024-03-19 DIAGNOSIS — G44.229 CHRONIC TENSION-TYPE HEADACHE, NOT INTRACTABLE: ICD-10-CM

## 2024-03-19 PROCEDURE — 95970 ALYS NPGT W/O PRGRMG: CPT | Mod: S$PBB,,, | Performed by: NURSE PRACTITIONER

## 2024-03-19 PROCEDURE — 99215 OFFICE O/P EST HI 40 MIN: CPT | Mod: PBBFAC | Performed by: NURSE PRACTITIONER

## 2024-03-19 PROCEDURE — 95970 ALYS NPGT W/O PRGRMG: CPT | Mod: PBBFAC | Performed by: NURSE PRACTITIONER

## 2024-03-19 PROCEDURE — 99213 OFFICE O/P EST LOW 20 MIN: CPT | Mod: 25,S$PBB,, | Performed by: NURSE PRACTITIONER

## 2024-03-19 RX ORDER — LAMOTRIGINE 200 MG/1
TABLET ORAL
Qty: 270 TABLET | Refills: 3 | Status: SHIPPED | OUTPATIENT
Start: 2024-03-19 | End: 2024-04-25 | Stop reason: SDUPTHER

## 2024-03-19 RX ORDER — LEVETIRACETAM 1000 MG/1
1000 TABLET ORAL 2 TIMES DAILY
Qty: 270 TABLET | Refills: 1 | Status: SHIPPED | OUTPATIENT
Start: 2024-03-19

## 2024-03-19 NOTE — PATIENT INSTRUCTIONS
1. Continue the current keppra and lamictal as dircted  2. VNS evaluation done, no changes in settings today  3. Continue the celexa 40mg daily  4. Continue the once monthly Aimovig injections as directed  5. Trough lamictal level today      Seizure Precautions:  1. Take medications as directed  2. Avoid open flames and hot surfaces such as fires and grills  3. Avoid elevations such as ladders or stools  4. Avoid swimming alone  5. Make sure getting plenty of sleep, recommend 7-8 hours per day  6. Avoid alcohol and illicit drugs  7. No driving or operating heavy machinery for 6 months after last known seizure

## 2024-03-19 NOTE — PROGRESS NOTES
Subjective:       Patient ID: Brian Duong is a 48 y.o. female     Chief Complaint:    Chief Complaint   Patient presents with    Follow-up            History of Present Illness  47 y/o WF followed in clinic for history of epilepsy, s/p VNS, hydrocephalus with history of  shunt placement >15 years ago, and headaches.  Family present with her.    Last lamictal level was in August 2022, and was WNL at 13    I checked her VNS today, we did not make any changes to her settings.  Battery life listed now is 11-25%, last visit 6 months ago was 25-50%.  I did discuss with them that likely at next follow-up we would need to start process of setting her up for replacement.    She has the Lamictal 200mg tablet taking 1.5 tabs BID and the Keppra 1000mg BID as well.  There was reported visit to the emergency department which that encounter was reviewed, possibly syncope, not witnessed, she had reported her left leg seemed to give out on her.  She had a CT head which noted no changes in shunt or ventricles from prior imaging.    Headaches are reported as very well.  This was greatly improved after we got her started on the Aimovig injections.     She is also on Celexa for depression and denies any worsening in mood to me, is also on abilify           Review of Systems  Review of Systems   Constitutional:  Negative for diaphoresis and fever.   HENT:  Negative for congestion, hearing loss and tinnitus.    Eyes:  Negative for blurred vision, double vision, photophobia, discharge and redness.   Respiratory:  Negative for cough and shortness of breath.    Cardiovascular:  Negative for chest pain.   Gastrointestinal:  Negative for abdominal pain, nausea and vomiting.   Musculoskeletal:  Negative for back pain, falls, joint pain, myalgias and neck pain.   Skin:  Negative for itching and rash.   Neurological:  Positive for seizures and headaches. Negative for dizziness, tremors, sensory change, speech change, focal weakness, loss  of consciousness and weakness.   Psychiatric/Behavioral:  Positive for depression and memory loss. Negative for hallucinations. The patient does not have insomnia.    All other systems reviewed and are negative.     Objective:     NEUROLOGICAL EXAMINATION:     MENTAL STATUS   Oriented to person, place, and time.   Registration: recalls 3 of 3 objects. Recall at 5 minutes: recalls 3 of 3 objects.   Attention: normal. Concentration: normal.   Speech: speech is normal   Level of consciousness: alert  Knowledge: good and consistent with education.   Normal comprehension.     CRANIAL NERVES     CN II   Visual fields full to confrontation.   Visual acuity: normal  Right visual field deficit: none  Left visual field deficit: none     CN III, IV, VI   Pupils are equal, round, and reactive to light.  Extraocular motions are normal.   Right pupil: Size: 3 mm. Shape: regular. Reactivity: brisk. Consensual response: intact. Accommodation: intact.   Left pupil: Size: 3 mm. Shape: regular. Reactivity: brisk. Consensual response: intact. Accommodation: intact.   CN III: no CN III palsy  CN VI: no CN VI palsy  Nystagmus: none   Diplopia: none  Upgaze: normal  Downgaze: normal  Conjugate gaze: present  Vestibulo-ocular reflex: present    CN V   Facial sensation intact.   Right facial sensation deficit: none  Left facial sensation deficit: none  Right corneal reflex: normal  Left corneal reflex: normal    CN VII   Facial expression full, symmetric.   Right facial weakness: none  Left facial weakness: none  Right taste: normal  Left taste: normal    CN VIII   CN VIII normal.   Hearing: intact    CN IX, X   CN IX normal.   CN X normal.   Palate: symmetric    CN XI   CN XI normal.   Right sternocleidomastoid strength: normal  Left sternocleidomastoid strength: normal  Right trapezius strength: normal  Left trapezius strength: normal    CN XII   CN XII normal.   Tongue: not atrophic  Fasciculations: absent  Tongue deviation:  none    MOTOR EXAM   Muscle bulk: normal  Overall muscle tone: normal  Right arm tone: normal  Left arm tone: increased  Right arm pronator drift: absent  Left arm pronator drift: absent  Right leg tone: normal  Left leg tone: increased    Strength   Right neck flexion: 5/5  Left neck flexion: 5/5  Right neck extension: 5/5  Left neck extension: 5/5  Right deltoid: 5/5  Left deltoid: 4/5  Right biceps: 5/5  Left biceps: 4/5  Right triceps: 5/5  Left triceps: 4/5  Right wrist flexion: 5/5  Left wrist flexion: 4/5  Right wrist extension: 5/5  Left wrist extension: 4/5  Right interossei: 5/5  Left interossei: 4/5  Right iliopsoas: 5/5  Left iliopsoas: 4/5  Right quadriceps: 5/5  Left quadriceps: 4/5  Right hamstrin/5  Left hamstrin/5  Right anterior tibial: 5/5  Left anterior tibial: 4/5  Right posterior tibial: 5/5  Left posterior tibial: 4/5  Right gastroc: 5/5  Left gastroc: 4/5    REFLEXES     Reflexes   Right brachioradialis: 2+  Left brachioradialis: 2+  Right biceps: 2+  Left biceps: 2+  Right triceps: 2+  Left triceps: 2+  Right patellar: 2+  Left patellar: 2+  Right achilles: 2+  Left achilles: 2+  Right plantar: normal  Left plantar: normal  Right Hardin: absent  Left Hardin: absent  Right ankle clonus: absent  Left ankle clonus: absent  Right pendular knee jerk: absent  Left pendular knee jerk: absent    SENSORY EXAM   Light touch normal.   Right arm light touch: normal  Left arm light touch: normal  Right leg light touch: normal  Left leg light touch: normal  Vibration normal.   Right arm vibration: normal  Left arm vibration: normal  Right leg vibration: normal  Left leg vibration: normal  Proprioception normal.   Right arm proprioception: normal  Left arm proprioception: normal  Right leg proprioception: normal  Left leg proprioception: normal  Pinprick normal.   Right arm pinprick: normal  Left arm pinprick: normal  Right leg pinprick: normal  Left leg pinprick: normal  Graphesthesia:  normal  Romberg: negative  Stereognosis: normal    GAIT AND COORDINATION      Coordination   Finger to nose coordination: abnormal  Heel to shin coordination: abnormal  Tandem walking coordination: abnormal    Tremor   Resting tremor: absent  Intention tremor: absent  Action tremor: absent       Physical Exam  Vitals and nursing note reviewed.   Constitutional:       Appearance: Normal appearance.   HENT:      Head: Normocephalic.   Eyes:      Extraocular Movements: Extraocular movements intact and EOM normal.      Pupils: Pupils are equal, round, and reactive to light.   Cardiovascular:      Rate and Rhythm: Normal rate and regular rhythm.   Pulmonary:      Effort: Pulmonary effort is normal.      Breath sounds: Normal breath sounds.   Musculoskeletal:         General: No swelling or tenderness. Normal range of motion.      Cervical back: Normal range of motion and neck supple.      Right lower leg: No edema.      Left lower leg: No edema.   Skin:     General: Skin is warm and dry.      Coloration: Skin is not jaundiced.      Findings: No rash.   Neurological:      General: No focal deficit present.      Mental Status: She is alert and oriented to person, place, and time. Mental status is at baseline.      GCS: GCS eye subscore is 4. GCS verbal subscore is 5. GCS motor subscore is 6.      Cranial Nerves: No cranial nerve deficit.      Sensory: No sensory deficit.      Motor: Motor function is intact. No weakness.      Coordination: Coordination abnormal. Finger-Nose-Finger Test abnormal and Heel to Shin Test abnormal. Romberg Test normal.      Gait: Gait abnormal and tandem walk abnormal.      Deep Tendon Reflexes: Reflexes normal.      Reflex Scores:       Tricep reflexes are 2+ on the right side and 2+ on the left side.       Bicep reflexes are 2+ on the right side and 2+ on the left side.       Brachioradialis reflexes are 2+ on the right side and 2+ on the left side.       Patellar reflexes are 2+ on the right  side and 2+ on the left side.       Achilles reflexes are 2+ on the right side and 2+ on the left side.  Psychiatric:         Mood and Affect: Mood normal.         Speech: Speech normal.         Behavior: Behavior normal.          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 Need labs next visit  2. VNS device evaluated, no changes were made  3. Continue the lamictal and keppra as directed  4. Notify clinic if any further seizures, as will need to obtain labs    Seizure precautions:  1. Take medications as directed  2. Avoid open flames and hot surfaces  3. Avoid elevations  4. Avoid swimming alone  5. Make sure getting plenty of sleep  6. Avoid alcohol  7. No driving or operating heavy machinery for 6 months after last known seizure

## 2024-03-31 ENCOUNTER — EXTERNAL CHRONIC CARE MANAGEMENT (OUTPATIENT)
Dept: FAMILY MEDICINE | Facility: CLINIC | Age: 49
End: 2024-03-31
Payer: MEDICARE

## 2024-03-31 PROCEDURE — G0511 CCM/BHI BY RHC/FQHC 20MIN MO: HCPCS | Mod: ,,, | Performed by: FAMILY MEDICINE

## 2024-04-16 DIAGNOSIS — R42 DIZZINESS: ICD-10-CM

## 2024-04-16 RX ORDER — MECLIZINE HYDROCHLORIDE 25 MG/1
25 TABLET ORAL 3 TIMES DAILY PRN
Qty: 60 TABLET | Refills: 11 | Status: SHIPPED | OUTPATIENT
Start: 2024-04-16

## 2024-04-22 ENCOUNTER — OFFICE VISIT (OUTPATIENT)
Dept: NEUROLOGY | Facility: CLINIC | Age: 49
End: 2024-04-22
Payer: MEDICARE

## 2024-04-22 VITALS
DIASTOLIC BLOOD PRESSURE: 78 MMHG | BODY MASS INDEX: 25.34 KG/M2 | SYSTOLIC BLOOD PRESSURE: 122 MMHG | RESPIRATION RATE: 16 BRPM | HEART RATE: 92 BPM | OXYGEN SATURATION: 98 % | HEIGHT: 63 IN | WEIGHT: 143 LBS

## 2024-04-22 DIAGNOSIS — F32.A DEPRESSION, UNSPECIFIED DEPRESSION TYPE: Primary | ICD-10-CM

## 2024-04-22 DIAGNOSIS — F41.9 ANXIETY: ICD-10-CM

## 2024-04-22 DIAGNOSIS — R42 DIZZINESS AND GIDDINESS: ICD-10-CM

## 2024-04-22 DIAGNOSIS — E53.8 VITAMIN B12 DEFICIENCY: ICD-10-CM

## 2024-04-22 DIAGNOSIS — Z79.899 ENCOUNTER FOR LONG-TERM CURRENT USE OF MEDICATION: ICD-10-CM

## 2024-04-22 DIAGNOSIS — E55.9 VITAMIN D DEFICIENCY: ICD-10-CM

## 2024-04-22 DIAGNOSIS — E03.9 HYPOTHYROIDISM, UNSPECIFIED TYPE: ICD-10-CM

## 2024-04-22 PROCEDURE — 99215 OFFICE O/P EST HI 40 MIN: CPT | Mod: PBBFAC | Performed by: NURSE PRACTITIONER

## 2024-04-22 PROCEDURE — 99213 OFFICE O/P EST LOW 20 MIN: CPT | Mod: S$PBB,,, | Performed by: NURSE PRACTITIONER

## 2024-04-22 NOTE — PATIENT INSTRUCTIONS
1. Continue the current keppra and lamictal as dircted  2. CT head  3. Continue the celexa 40mg daily  4. Continue the once monthly Aimovig injections as directed  5. Trough lamictal level today along with other labs      Seizure Precautions:  1. Take medications as directed  2. Avoid open flames and hot surfaces such as fires and grills  3. Avoid elevations such as ladders or stools  4. Avoid swimming alone  5. Make sure getting plenty of sleep, recommend 7-8 hours per day  6. Avoid alcohol and illicit drugs  7. No driving or operating heavy machinery for 6 months after last known seizure

## 2024-04-22 NOTE — PROGRESS NOTES
"    Subjective:       Patient ID: Brian Duong is a 48 y.o. female     Chief Complaint:    Chief Complaint   Patient presents with    Follow-up     Patient states she can not move her head and she can barely see anything.            History of Present Illness  49 y/o WF followed in clinic for history of epilepsy, s/p VNS, hydrocephalus with history of  shunt placement >15 years ago, and headaches.  Family present with her.    She is brought in early today per family reporting patient with blacking out events, dizziness.  No recent labs to review so will certainly obtain.  On exam, patient reports she "can't do anything", states she "can't see", can't move.  Family with her reports she was able to use her walker to get to the car, which would of been totally different.  She is able to speak to me very clearly, but then she acts like she is not able to stick her tongue out, but says "I Can't".  Family with her reports she was doing very well at her mothers, but returned home 7 days ago, had a fall, and has been having the reported events since that time, but the reports by patient and physical assessment do not match.  I have to admit in the past when similar events it was actually found to be more mood component than actual physical disability.  Will obtain labs on her, CT head given her subjective complaints. On physical exam she does not appear to put forth full effort so difficult to determine acute findings.  When I first entered room she was noted smiling when telling me about her symptoms, but later during exam when I asked her to smile she reported she could not do it, said she could not see, but was keeping her eyes closed but when told to open them, which she initially would not do, but when she did she was noted that her pupils immediately focused on me standing to her side.    Last shunt evaluation was with Dr. Pruitt in 2019    Last lamictal level was in August 2022, and was WNL at 13    I checked " her VNS today, we did not make any changes to her settings.  Battery life listed now is 11-25% which was noted last visit 2 months ago.  Our plan is to set her up for battery replacement around the August of 2024 as this had just changed to this percentage last visit.    She has the Lamictal 200mg tablet taking 1.5 tabs BID and the Keppra 1000mg BID as well.  There was reported visit to the emergency department which that encounter was reviewed, possibly syncope, not witnessed, she had reported her left leg seemed to give out on her.  She had a CT head which noted no changes in shunt or ventricles from prior imaging.    Headaches are reported as very well.  This was greatly improved after we got her started on the Aimovig injections.     She is also on Celexa for depression and denies any worsening in mood to me, is also on abilify           Review of Systems  Review of Systems   Constitutional:  Negative for diaphoresis and fever.   HENT:  Negative for congestion, hearing loss and tinnitus.    Eyes:  Negative for blurred vision, double vision, photophobia, discharge and redness.   Respiratory:  Negative for cough and shortness of breath.    Cardiovascular:  Negative for chest pain.   Gastrointestinal:  Negative for abdominal pain, nausea and vomiting.   Musculoskeletal:  Negative for back pain, falls, joint pain, myalgias and neck pain.   Skin:  Negative for itching and rash.   Neurological:  Positive for seizures and headaches. Negative for dizziness, tremors, sensory change, speech change, focal weakness, loss of consciousness and weakness.   Psychiatric/Behavioral:  Positive for depression and memory loss. Negative for hallucinations. The patient does not have insomnia.    All other systems reviewed and are negative.     Objective:     NEUROLOGICAL EXAMINATION:     MENTAL STATUS   Oriented to person, place, and time.   Registration: recalls 3 of 3 objects. Recall at 5 minutes: recalls 3 of 3 objects.    Attention: normal. Concentration: normal.   Speech: speech is normal   Level of consciousness: alert  Knowledge: good and consistent with education.   Normal comprehension.     CRANIAL NERVES     CN II   Visual fields full to confrontation.   Visual acuity: normal  Right visual field deficit: none  Left visual field deficit: none     CN III, IV, VI   Pupils are equal, round, and reactive to light.  Extraocular motions are normal.   Right pupil: Size: 3 mm. Shape: regular. Reactivity: brisk. Consensual response: intact. Accommodation: intact.   Left pupil: Size: 3 mm. Shape: regular. Reactivity: brisk. Consensual response: intact. Accommodation: intact.   CN III: no CN III palsy  CN VI: no CN VI palsy  Nystagmus: none   Diplopia: none  Upgaze: normal  Downgaze: normal  Conjugate gaze: present  Vestibulo-ocular reflex: present    CN V   Facial sensation intact.   Right facial sensation deficit: none  Left facial sensation deficit: none  Right corneal reflex: normal  Left corneal reflex: normal    CN VII   Facial expression full, symmetric.   Right facial weakness: none  Left facial weakness: none  Right taste: normal  Left taste: normal    CN VIII   CN VIII normal.   Hearing: intact    CN IX, X   CN IX normal.   CN X normal.   Palate: symmetric    CN XI   CN XI normal.   Right sternocleidomastoid strength: normal  Left sternocleidomastoid strength: normal  Right trapezius strength: normal  Left trapezius strength: normal    CN XII   CN XII normal.   Tongue: not atrophic  Fasciculations: absent  Tongue deviation: none    MOTOR EXAM   Muscle bulk: normal  Overall muscle tone: normal  Right arm tone: normal  Left arm tone: increased  Right arm pronator drift: absent  Left arm pronator drift: absent  Right leg tone: normal  Left leg tone: increased    Strength   Right neck flexion: 5/5  Left neck flexion: 5/5  Right neck extension: 5/5  Left neck extension: 5/5  Right deltoid: 5/5  Left deltoid: 4/5  Right biceps:  5/5  Left biceps: 4/5  Right triceps: 5/5  Left triceps: 4/5  Right wrist flexion: 5/5  Left wrist flexion: 4/5  Right wrist extension: 5/5  Left wrist extension: 4/5  Right interossei: 5/5  Left interossei: 4/5  Right iliopsoas: 5/5  Left iliopsoas: 4/5  Right quadriceps: 5/5  Left quadriceps: 4/5  Right hamstrin/5  Left hamstrin/5  Right anterior tibial: 5/5  Left anterior tibial: 4/5  Right posterior tibial: 5/5  Left posterior tibial: 4/5  Right gastroc: 5/5  Left gastroc: 4/5    REFLEXES     Reflexes   Right brachioradialis: 2+  Left brachioradialis: 2+  Right biceps: 2+  Left biceps: 2+  Right triceps: 2+  Left triceps: 2+  Right patellar: 2+  Left patellar: 2+  Right achilles: 2+  Left achilles: 2+  Right plantar: normal  Left plantar: normal  Right Hardin: absent  Left Hardin: absent  Right ankle clonus: absent  Left ankle clonus: absent  Right pendular knee jerk: absent  Left pendular knee jerk: absent    SENSORY EXAM   Light touch normal.   Right arm light touch: normal  Left arm light touch: normal  Right leg light touch: normal  Left leg light touch: normal  Vibration normal.   Right arm vibration: normal  Left arm vibration: normal  Right leg vibration: normal  Left leg vibration: normal  Proprioception normal.   Right arm proprioception: normal  Left arm proprioception: normal  Right leg proprioception: normal  Left leg proprioception: normal  Pinprick normal.   Right arm pinprick: normal  Left arm pinprick: normal  Right leg pinprick: normal  Left leg pinprick: normal  Graphesthesia: normal  Romberg: negative  Stereognosis: normal    GAIT AND COORDINATION      Coordination   Finger to nose coordination: abnormal  Heel to shin coordination: abnormal  Tandem walking coordination: abnormal    Tremor   Resting tremor: absent  Intention tremor: absent  Action tremor: absent       Physical Exam  Vitals and nursing note reviewed.   Constitutional:       Appearance: Normal appearance.   HENT:       Head: Normocephalic.   Eyes:      Extraocular Movements: Extraocular movements intact and EOM normal.      Pupils: Pupils are equal, round, and reactive to light.   Cardiovascular:      Rate and Rhythm: Normal rate and regular rhythm.   Pulmonary:      Effort: Pulmonary effort is normal.      Breath sounds: Normal breath sounds.   Musculoskeletal:         General: No swelling or tenderness. Normal range of motion.      Cervical back: Normal range of motion and neck supple.      Right lower leg: No edema.      Left lower leg: No edema.   Skin:     General: Skin is warm and dry.      Coloration: Skin is not jaundiced.      Findings: No rash.   Neurological:      General: No focal deficit present.      Mental Status: She is alert and oriented to person, place, and time. Mental status is at baseline.      GCS: GCS eye subscore is 4. GCS verbal subscore is 5. GCS motor subscore is 6.      Cranial Nerves: No cranial nerve deficit.      Sensory: No sensory deficit.      Motor: Motor function is intact. No weakness.      Coordination: Coordination abnormal. Finger-Nose-Finger Test abnormal and Heel to Shin Test abnormal. Romberg Test normal.      Gait: Gait abnormal and tandem walk abnormal.      Deep Tendon Reflexes: Reflexes normal.      Reflex Scores:       Tricep reflexes are 2+ on the right side and 2+ on the left side.       Bicep reflexes are 2+ on the right side and 2+ on the left side.       Brachioradialis reflexes are 2+ on the right side and 2+ on the left side.       Patellar reflexes are 2+ on the right side and 2+ on the left side.       Achilles reflexes are 2+ on the right side and 2+ on the left side.  Psychiatric:         Mood and Affect: Mood normal.         Speech: Speech normal.         Behavior: Behavior normal.          Assessment:     Problem List Items Addressed This Visit          Psychiatric    Anxiety (Chronic)    Depression - Primary       Endocrine    Vitamin D deficiency    Relevant Orders     Vitamin D     Other Visit Diagnoses       Dizziness and giddiness        Relevant Orders    CBC Auto Differential    Comprehensive Metabolic Panel    Urinalysis, Reflex to Urine Culture    CT Head Without Contrast    Vitamin B12 deficiency        Relevant Orders    Folate    Vitamin B12    Hypothyroidism, unspecified type        Relevant Orders    TSH    Encounter for long-term current use of medication        Relevant Orders    Lamotrigine Level                     Plan:     1. Need labs next visit  2. VNS device evaluated, no changes were made  3. Continue the lamictal and keppra as directed  4. Notify clinic if any further seizures, as will need to obtain labs    Seizure precautions:  1. Take medications as directed  2. Avoid open flames and hot surfaces  3. Avoid elevations  4. Avoid swimming alone  5. Make sure getting plenty of sleep  6. Avoid alcohol  7. No driving or operating heavy machinery for 6 months after last known seizure

## 2024-04-23 ENCOUNTER — HOSPITAL ENCOUNTER (EMERGENCY)
Facility: HOSPITAL | Age: 49
Discharge: HOME | End: 2024-04-23
Payer: MEDICARE

## 2024-04-23 VITALS
WEIGHT: 155 LBS | HEART RATE: 74 BPM | SYSTOLIC BLOOD PRESSURE: 125 MMHG | RESPIRATION RATE: 17 BRPM | BODY MASS INDEX: 28.52 KG/M2 | OXYGEN SATURATION: 99 % | HEIGHT: 62 IN | TEMPERATURE: 99 F | DIASTOLIC BLOOD PRESSURE: 73 MMHG

## 2024-04-23 DIAGNOSIS — S09.90XA INJURY OF HEAD, INITIAL ENCOUNTER: ICD-10-CM

## 2024-04-23 DIAGNOSIS — S00.01XA ABRASION OF SCALP, INITIAL ENCOUNTER: ICD-10-CM

## 2024-04-23 DIAGNOSIS — R53.1 WEAKNESS: ICD-10-CM
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
## 2024-04-23 LAB
ALBUMIN SERPL BCP-MCNC: 2.9 G/DL (ref 3.5–5)
ALBUMIN/GLOB SERPL: 0.7 {RATIO}
ALP SERPL-CCNC: 95 U/L (ref 39–100)
ALT SERPL W P-5'-P-CCNC: 39 U/L (ref 13–56)
ANION GAP SERPL CALCULATED.3IONS-SCNC: 12 MMOL/L (ref 7–16)
AST SERPL W P-5'-P-CCNC: 47 U/L (ref 15–37)
BASOPHILS # BLD AUTO: 0.02 K/UL (ref 0–0.2)
BASOPHILS NFR BLD AUTO: 0.3 % (ref 0–1)
BILIRUB SERPL-MCNC: 0.5 MG/DL (ref ?–1.2)
BILIRUB UR QL STRIP: NEGATIVE
BUN SERPL-MCNC: 13 MG/DL (ref 7–18)
BUN/CREAT SERPL: 19 (ref 6–20)
CALCIUM SERPL-MCNC: 8.8 MG/DL (ref 8.5–10.1)
CHLORIDE SERPL-SCNC: 103 MMOL/L (ref 98–107)
CLARITY UR: CLEAR
CO2 SERPL-SCNC: 29 MMOL/L (ref 21–32)
COLOR UR: YELLOW
CREAT SERPL-MCNC: 0.67 MG/DL (ref 0.55–1.02)
DIFFERENTIAL METHOD BLD: ABNORMAL
EGFR (NO RACE VARIABLE) (RUSH/TITUS): 109 ML/MIN/1.73M2
EOSINOPHIL # BLD AUTO: 0.02 K/UL (ref 0–0.5)
EOSINOPHIL NFR BLD AUTO: 0.3 % (ref 1–4)
ERYTHROCYTE [DISTWIDTH] IN BLOOD BY AUTOMATED COUNT: 11.9 % (ref 11.5–14.5)
GLOBULIN SER-MCNC: 4 G/DL (ref 2–4)
GLUCOSE SERPL-MCNC: 78 MG/DL (ref 74–106)
GLUCOSE UR STRIP-MCNC: NORMAL MG/DL
HCT VFR BLD AUTO: 38 % (ref 38–47)
HGB BLD-MCNC: 12.7 G/DL (ref 12–16)
IMM GRANULOCYTES # BLD AUTO: 0.03 K/UL (ref 0–0.04)
IMM GRANULOCYTES NFR BLD: 0.5 % (ref 0–0.4)
KETONES UR STRIP-SCNC: ABNORMAL MG/DL
LEUKOCYTE ESTERASE UR QL STRIP: NEGATIVE
LYMPHOCYTES # BLD AUTO: 1.46 K/UL (ref 1–4.8)
LYMPHOCYTES NFR BLD AUTO: 23.1 % (ref 27–41)
MAGNESIUM SERPL-MCNC: 1.8 MG/DL (ref 1.7–2.3)
MCH RBC QN AUTO: 32.4 PG (ref 27–31)
MCHC RBC AUTO-ENTMCNC: 33.4 G/DL (ref 32–36)
MCV RBC AUTO: 96.9 FL (ref 80–96)
MONOCYTES # BLD AUTO: 0.41 K/UL (ref 0–0.8)
MONOCYTES NFR BLD AUTO: 6.5 % (ref 2–6)
MPC BLD CALC-MCNC: 9.5 FL (ref 9.4–12.4)
NEUTROPHILS # BLD AUTO: 4.37 K/UL (ref 1.8–7.7)
NEUTROPHILS NFR BLD AUTO: 69.3 % (ref 53–65)
NITRITE UR QL STRIP: NEGATIVE
NRBC # BLD AUTO: 0 X10E3/UL
NRBC, AUTO (.00): 0 %
PH UR STRIP: 7 PH UNITS
PLATELET # BLD AUTO: 289 K/UL (ref 150–400)
POTASSIUM SERPL-SCNC: 4.5 MMOL/L (ref 3.5–5.1)
PROT SERPL-MCNC: 6.9 G/DL (ref 6.4–8.2)
PROT UR QL STRIP: 10
RBC # BLD AUTO: 3.92 M/UL (ref 4.2–5.4)
RBC # UR STRIP: NEGATIVE /UL
SODIUM SERPL-SCNC: 139 MMOL/L (ref 136–145)
SP GR UR STRIP: 1.02
TROPONIN I SERPL DL<=0.01 NG/ML-MCNC: 4.5 PG/ML
UROBILINOGEN UR STRIP-ACNC: 2 MG/DL
WBC # BLD AUTO: 6.31 K/UL (ref 4.5–11)

## 2024-04-23 PROCEDURE — 81003 URINALYSIS AUTO W/O SCOPE: CPT | Performed by: NURSE PRACTITIONER

## 2024-04-23 PROCEDURE — 93010 ELECTROCARDIOGRAM REPORT: CPT | Mod: ,,, | Performed by: HOSPITALIST

## 2024-04-23 PROCEDURE — 93005 ELECTROCARDIOGRAM TRACING: CPT

## 2024-04-23 PROCEDURE — 99284 EMERGENCY DEPT VISIT MOD MDM: CPT | Mod: ,,, | Performed by: NURSE PRACTITIONER

## 2024-04-23 PROCEDURE — 25000003 PHARM REV CODE 250: Performed by: NURSE PRACTITIONER

## 2024-04-23 PROCEDURE — 83735 ASSAY OF MAGNESIUM: CPT | Performed by: NURSE PRACTITIONER

## 2024-04-23 PROCEDURE — 80053 COMPREHEN METABOLIC PANEL: CPT | Performed by: NURSE PRACTITIONER

## 2024-04-23 PROCEDURE — 84484 ASSAY OF TROPONIN QUANT: CPT | Performed by: NURSE PRACTITIONER

## 2024-04-23 PROCEDURE — 85025 COMPLETE CBC W/AUTO DIFF WBC: CPT | Performed by: NURSE PRACTITIONER

## 2024-04-23 PROCEDURE — 99285 EMERGENCY DEPT VISIT HI MDM: CPT | Mod: 25

## 2024-04-23 PROCEDURE — 96360 HYDRATION IV INFUSION INIT: CPT

## 2024-04-23 RX ADMIN — SODIUM CHLORIDE 1000 ML: 9 INJECTION, SOLUTION INTRAVENOUS at 12:04

## 2024-04-23 NOTE — ED PROVIDER NOTES
Encounter Date: 4/23/2024       History     Chief Complaint   Patient presents with    Fall     Patient presents to ER with complaint of fall.  Patient reports she was getting out of the car and family member was getting the walker for her and she tumbled forward and hit her head.  She states her head started bleeding right away.  She denies LOC.  Family member reports patient has fallen several times over the last week. She is awake and alert and responds appropriately to questions.  She denies other injury and only complains of headache and pain at site of abrasion. She denies dizziness.  No chest pain or shortness of breath.  She states she's had more difficulty walking over the last few weeks and has been using her walker more.     The history is provided by the patient, a parent and a relative. No  was used.     Review of patient's allergies indicates:  No Known Allergies  Past Medical History:   Diagnosis Date    Aneurysm     Arthritis     Chronic migraine without aura, intractable, without status migrainosus     Epilepsy characterized by intractable complex partial seizures     GERD (gastroesophageal reflux disease)     Headache     Hydrocephalus     Hypertension      Past Surgical History:   Procedure Laterality Date    VAGUS NERVE STIMULATOR INSERTION      VENTRICULOPERITONEAL SHUNT       Family History   Problem Relation Name Age of Onset    Cancer Mother      Thyroid disease Mother      Hyperlipidemia Father      Breast cancer Maternal Aunt       Social History     Tobacco Use    Smoking status: Never     Passive exposure: Never    Smokeless tobacco: Never   Substance Use Topics    Alcohol use: Never    Drug use: Never     Review of Systems   Constitutional:  Positive for activity change and fatigue.   Skin:  Positive for wound (laceration to posterior scalp after fall).   Neurological:  Positive for headaches.   All other systems reviewed and are negative.      Physical Exam      Initial Vitals [04/23/24 1157]   BP Pulse Resp Temp SpO2   125/73 74 17 98.9 °F (37.2 °C) 99 %      MAP       --         Physical Exam    Nursing note and vitals reviewed.  Constitutional: She appears well-developed and well-nourished.   HENT:   Head: Normocephalic.   Nose: Nose normal.   Mouth/Throat: Oropharynx is clear and moist.   Eyes: EOM are normal. Pupils are equal, round, and reactive to light.   Neck: Neck supple.   Normal range of motion.  Cardiovascular:  Normal rate, normal heart sounds and intact distal pulses.           Pulmonary/Chest: Breath sounds normal.   Abdominal: Abdomen is soft. Bowel sounds are normal.   Musculoskeletal:         General: Normal range of motion.      Cervical back: Normal range of motion and neck supple.     Neurological: She is alert and oriented to person, place, and time. She has normal strength. GCS score is 15. GCS eye subscore is 4. GCS verbal subscore is 5. GCS motor subscore is 6.   Skin: Skin is warm and dry. Capillary refill takes less than 2 seconds.   Small abrasion noted posterior scalp   Psychiatric: She has a normal mood and affect.         Medical Screening Exam   See Full Note    ED Course   Procedures  Labs Reviewed   COMPREHENSIVE METABOLIC PANEL - Abnormal; Notable for the following components:       Result Value    Albumin 2.9 (*)     AST 47 (*)     All other components within normal limits   URINALYSIS, REFLEX TO URINE CULTURE - Abnormal; Notable for the following components:    Protein, UA 10 (*)     Urobilinogen, UA 2 (*)     All other components within normal limits   CBC WITH DIFFERENTIAL - Abnormal; Notable for the following components:    RBC 3.92 (*)     MCV 96.9 (*)     MCH 32.4 (*)     Neutrophils % 69.3 (*)     Lymphocytes % 23.1 (*)     Monocytes % 6.5 (*)     Eosinophils % 0.3 (*)     Immature Granulocytes % 0.5 (*)     All other components within normal limits   MAGNESIUM - Normal   TROPONIN I - Normal   CBC W/ AUTO DIFFERENTIAL     Narrative:     The following orders were created for panel order CBC auto differential.  Procedure                               Abnormality         Status                     ---------                               -----------         ------                     CBC with Differential[[id number]]       Abnormal            Final result                 Please view results for these tests on the individual orders.   EXTRA TUBES    Narrative:     The following orders were created for panel order EXTRA TUBES.  Procedure                               Abnormality         Status                     ---------                               -----------         ------                     Light Blue Top Hold[[id number]]                             In process                 Gold Top Hold[[id number]]                                   In process                   Please view results for these tests on the individual orders.   LIGHT BLUE TOP HOLD   GOLD TOP HOLD          Imaging Results              X-Ray Chest AP Portable (Final result)  Result time 04/23/24 12:49:32      Final result by Taco Holbrook II, MD (04/23/24 12:49:32)                   Impression:      No evidence of acute cardiopulmonary disease.      Electronically signed by: Taco Holbrook  Date:    04/23/2024  Time:    12:49               Narrative:    EXAMINATION:  XR CHEST AP PORTABLE    CLINICAL HISTORY:  weakness;    COMPARISON:  19 February 2024    TECHNIQUE:  XR CHEST AP PORTABLE    FINDINGS:  The heart and mediastinum are normal in size and configuration.  A stimulator device is unchanged.  The pulmonary vascularity is normal in caliber.  No lung infiltrates, effusions, pneumothorax or other abnormality is demonstrated.                                       CT Head Without Contrast (Final result)  Result time 04/23/24 12:47:44      Final result by Taco Holbrook II, MD (04/23/24 12:47:44)                   Impression:      No evidence of acute process  or interval change.      Electronically signed by: Taco Holbrook  Date:    04/23/2024  Time:    12:47               Narrative:    EXAMINATION:  CT HEAD WITHOUT CONTRAST    CLINICAL HISTORY:  Head trauma, moderate-severe;    TECHNIQUE:  Axial CT imaging of the brain is performed without contrast with 3 mm increments.    CT dose reduction technique used - Dose Rite and tube current modulation.    COMPARISON:  19 February 2024    FINDINGS:  No evidence of hemorrhage, mass, mass effect, midline shift or acute infarct seen.  Focal volume loss right posterior parietal lobe.  Ventricular shunts present similar to previous.  Remaining brain parenchyma attenuation and differentiation appears within normal limits for age. The ventricles and cisterns are normal in caliber.  No cranial or skull base abnormality is identified.                                       Medications   sodium chloride 0.9% bolus 1,000 mL 1,000 mL (0 mLs Intravenous Stopped 4/23/24 1356)     Medical Decision Making  Patient presents to ER with complaint of fall.  Patient reports she was getting out of the car and family member was getting the walker for her and she tumbled forward and hit her head.  She states her head started bleeding right away.  She denies LOC.  Family member reports patient has fallen several times over the last week. She is awake and alert and responds appropriately to questions.  She denies other injury and only complains of headache and pain at site of abrasion. She denies dizziness.  No chest pain or shortness of breath.  She states she's had more difficulty walking over the last few weeks and has been using her walker more.       Amount and/or Complexity of Data Reviewed  Labs: ordered. Decision-making details documented in ED Course.  Radiology: ordered. Decision-making details documented in ED Course.  ECG/medicine tests: ordered. Decision-making details documented in ED Course.  Discussion of management or test  interpretation with external provider(s): Initiate IV, collect lab work  1 l ns bolus    Patient is dc home with dx of fall, weakness, head injury, and scalp abrasion.  Patient is instructed to follow head injury precautions given handout.  She was told she can wash wound on her scalp with antibacterial soap or shampoo.  She was told to follow up with primary care provider in 2 days for recheck.  Return to the ER with new or worsening symptoms.  She was given fall precautions.  Patient and family both verbalized understanding and agree with plan of care.                                      Clinical Impression:   Final diagnoses:  [R53.1] Weakness  [W19.XXXA] Fall, initial encounter (Primary)  [S09.90XA] Injury of head, initial encounter  [S00.01XA] Abrasion of scalp, initial encounter        ED Disposition Condition    Discharge Stable          ED Prescriptions    None       Follow-up Information       Follow up With Specialties Details Why Contact Info    Anna See MD Family Medicine Schedule an appointment as soon as possible for a visit in 2 days  [address] [facility]  [facility]  [facility] [address]  [phone]               Noemi Trimble, FNP  04/23/24 0862

## 2024-04-23 NOTE — DISCHARGE INSTRUCTIONS
Follow head injury precautions given to you in handout.  Follow up with the primary care provider in 2 days for recheck.  Return to the ER with new or worsening symptoms.

## 2024-04-23 NOTE — ED TRIAGE NOTES
"Pt presents to ed via ems, c/o a fall with an approximate 1" laceration to the top of her head. Laceration is currently not bleeding.   "

## 2024-04-24 ENCOUNTER — OFFICE VISIT (OUTPATIENT)
Dept: FAMILY MEDICINE | Facility: CLINIC | Age: 49
End: 2024-04-24
Payer: MEDICARE

## 2024-04-24 ENCOUNTER — TELEPHONE (OUTPATIENT)
Dept: EMERGENCY MEDICINE | Facility: HOSPITAL | Age: 49
End: 2024-04-24
Payer: MEDICARE

## 2024-04-24 ENCOUNTER — TELEPHONE (OUTPATIENT)
Dept: NEUROLOGY | Facility: CLINIC | Age: 49
End: 2024-04-24
Payer: MEDICARE

## 2024-04-24 VITALS
OXYGEN SATURATION: 97 % | RESPIRATION RATE: 18 BRPM | WEIGHT: 140.63 LBS | HEART RATE: 81 BPM | HEIGHT: 62 IN | DIASTOLIC BLOOD PRESSURE: 76 MMHG | SYSTOLIC BLOOD PRESSURE: 116 MMHG | BODY MASS INDEX: 25.88 KG/M2 | TEMPERATURE: 34 F

## 2024-04-24 DIAGNOSIS — S01.01XD LACERATION OF SCALP, SUBSEQUENT ENCOUNTER: Primary | ICD-10-CM

## 2024-04-24 DIAGNOSIS — G40.309 NONINTRACTABLE GENERALIZED IDIOPATHIC EPILEPSY WITHOUT STATUS EPILEPTICUS: Primary | ICD-10-CM

## 2024-04-24 DIAGNOSIS — R29.6 FREQUENT FALLS: ICD-10-CM

## 2024-04-24 DIAGNOSIS — Z45.42 ENCOUNTER FOR FITTING AND ADJUSTMENT OF VAGUS NERVE STIMULATOR: ICD-10-CM

## 2024-04-24 LAB
OHS QRS DURATION: 86 MS
OHS QTC CALCULATION: 458 MS

## 2024-04-24 PROCEDURE — 99213 OFFICE O/P EST LOW 20 MIN: CPT | Mod: ,,, | Performed by: FAMILY MEDICINE

## 2024-04-24 NOTE — TELEPHONE ENCOUNTER
Called pt got v/m left message to call office asap.          ----- Message from AMY Reid sent at 4/24/2024  1:20 PM CDT -----  Her lamictal level is 27, twice as high as it was last time her levels were checked, and this was on the same dosing so not sure how this could be.  She needs to immediately reduce dosing to 1 tablet twice daily.  Need to repeat the trough level (absolutely must be done first thing in the morning before taking the lamictal).  This could certainly explain her general weakness symptoms.  I have placed order for the repeat level.  She needs to see us in about a month

## 2024-04-24 NOTE — TELEPHONE ENCOUNTER
Ms Duong called back and I gave her the information below from ZE Jacobson NP. She v/u.          ----- Message from AMY Reid sent at 4/24/2024  1:20 PM CDT -----  Her lamictal level is 27, twice as high as it was last time her levels were checked, and this was on the same dosing so not sure how this could be.  She needs to immediately reduce dosing to 1 tablet twice daily.  Need to repeat the trough level (absolutely must be done first thing in the morning before taking the lamictal).  This could certainly explain her general weakness symptoms.  I have placed order for the repeat level.  She needs to see us in about a month

## 2024-04-25 DIAGNOSIS — G40.309 NONINTRACTABLE GENERALIZED IDIOPATHIC EPILEPSY WITHOUT STATUS EPILEPTICUS: ICD-10-CM

## 2024-04-25 RX ORDER — LAMOTRIGINE 200 MG/1
200 TABLET ORAL 2 TIMES DAILY
Qty: 180 TABLET | Refills: 1 | Status: SHIPPED | OUTPATIENT
Start: 2024-04-25 | End: 2024-05-08

## 2024-04-29 ENCOUNTER — OFFICE VISIT (OUTPATIENT)
Dept: FAMILY MEDICINE | Facility: CLINIC | Age: 49
End: 2024-04-29
Payer: MEDICARE

## 2024-04-29 VITALS
HEART RATE: 71 BPM | BODY MASS INDEX: 25.76 KG/M2 | RESPIRATION RATE: 18 BRPM | SYSTOLIC BLOOD PRESSURE: 94 MMHG | HEIGHT: 62 IN | WEIGHT: 140 LBS | OXYGEN SATURATION: 97 % | TEMPERATURE: 98 F | DIASTOLIC BLOOD PRESSURE: 63 MMHG

## 2024-04-29 DIAGNOSIS — Z51.89 ENCOUNTER FOR POST-TRAUMATIC WOUND CHECK: Primary | ICD-10-CM

## 2024-04-29 PROCEDURE — 99212 OFFICE O/P EST SF 10 MIN: CPT | Mod: ICN,,, | Performed by: FAMILY MEDICINE

## 2024-04-29 NOTE — PROGRESS NOTES
"Subjective     Patient ID: Brian Duong is a 48 y.o. female.    Chief Complaint: Suture / Staple Removal    47 yo WF here for wound check. The patient and the family were under the impression that 5 sutures were place in her scalp. After further review of the ER note, there is no mention of any sutures.     Suture / Staple Removal      Review of Systems   Constitutional:  Negative for activity change.   Eyes:  Negative for visual disturbance.   Respiratory:  Negative for shortness of breath.    Cardiovascular:  Negative for chest pain.   Gastrointestinal:  Negative for abdominal pain.   Neurological:  Negative for headaches.   Psychiatric/Behavioral:  Negative for dysphoric mood.           Objective   Blood pressure 94/63, pulse 71, temperature 98.2 °F (36.8 °C), temperature source Oral, resp. rate 18, height 5' 2" (1.575 m), weight 63.5 kg (140 lb), SpO2 97%.    Physical Exam  Constitutional:       Appearance: Normal appearance.   HENT:      Head: Normocephalic and atraumatic.      Nose: Nose normal.      Mouth/Throat:      Mouth: Mucous membranes are moist.   Eyes:      Pupils: Pupils are equal, round, and reactive to light.   Cardiovascular:      Rate and Rhythm: Normal rate and regular rhythm.      Pulses: Normal pulses.      Heart sounds: Normal heart sounds.   Pulmonary:      Effort: Pulmonary effort is normal.      Breath sounds: Normal breath sounds.   Abdominal:      General: Abdomen is flat.      Palpations: Abdomen is soft.   Skin:     General: Skin is warm and dry.      Comments: The site of the laceration/ abrasion to the scalp is healed with no active bleeding. No sutures are identified.    Neurological:      General: No focal deficit present.      Mental Status: She is alert and oriented to person, place, and time.   Psychiatric:         Mood and Affect: Mood normal.         Behavior: Behavior normal.         Thought Content: Thought content normal.         Judgment: Judgment normal.          "   Assessment and Plan     1. Encounter for post-traumatic wound check                   Follow up if symptoms worsen or fail to improve.

## 2024-04-30 ENCOUNTER — EXTERNAL CHRONIC CARE MANAGEMENT (OUTPATIENT)
Dept: FAMILY MEDICINE | Facility: CLINIC | Age: 49
End: 2024-04-30
Payer: MEDICARE

## 2024-04-30 PROCEDURE — G0511 CCM/BHI BY RHC/FQHC 20MIN MO: HCPCS | Mod: ,,, | Performed by: FAMILY MEDICINE

## 2024-05-08 DIAGNOSIS — G40.309 NONINTRACTABLE GENERALIZED IDIOPATHIC EPILEPSY WITHOUT STATUS EPILEPTICUS: ICD-10-CM

## 2024-05-08 RX ORDER — LAMOTRIGINE 200 MG/1
TABLET ORAL
Qty: 270 TABLET | Refills: 1 | Status: SHIPPED | OUTPATIENT
Start: 2024-05-08 | End: 2024-06-11 | Stop reason: SDUPTHER

## 2024-05-15 ENCOUNTER — OFFICE VISIT (OUTPATIENT)
Dept: GASTROENTEROLOGY | Facility: CLINIC | Age: 49
End: 2024-05-15
Payer: MEDICARE

## 2024-05-15 VITALS
DIASTOLIC BLOOD PRESSURE: 64 MMHG | OXYGEN SATURATION: 97 % | BODY MASS INDEX: 27.02 KG/M2 | SYSTOLIC BLOOD PRESSURE: 117 MMHG | HEART RATE: 75 BPM | WEIGHT: 146.81 LBS | HEIGHT: 62 IN

## 2024-05-15 DIAGNOSIS — R94.5 ABNORMAL RESULTS OF LIVER FUNCTION STUDIES: ICD-10-CM

## 2024-05-15 DIAGNOSIS — R76.8 HEPATITIS C ANTIBODY TEST POSITIVE: Chronic | ICD-10-CM

## 2024-05-15 PROCEDURE — 99999 PR PBB SHADOW E&M-EST. PATIENT-LVL V: CPT | Mod: PBBFAC,,,

## 2024-05-15 PROCEDURE — 99215 OFFICE O/P EST HI 40 MIN: CPT | Mod: PBBFAC

## 2024-05-15 PROCEDURE — 99214 OFFICE O/P EST MOD 30 MIN: CPT | Mod: S$PBB,,,

## 2024-05-15 NOTE — PROGRESS NOTES
CC: Hepatitis C     HPI 48 y.o. white female special needs she is positive for hepatitis-C and was seen back a year ago past AMY Zheng.  She was never able to get in touch with patient to have ultrasounds done we will repeat all liver labs genotype being for hepatitis-C and ultrasounds for this patient to start treatment.  Discussed this with stepmother day that this was very important to get done.  Patient denies any abdominal pain, hematochezia or melena at this time denies any nausea vomiting or dysphagia at this visit    LMP was 2 weeks ago  Interval HPI:  Presents for initial visit due to hepatitis-C.  Labs on 05/10 reviewed, hepatitis-C PCR [id number].  Patient denies any previous IV drug use.  Patient does not drink alcohol.  We did discuss ordering liver labs today for further evaluation. Denies previous blood transfusion.  We did discuss that hepatitis-C is contagious through blood and body fluids.  She is been advised to avoid sharing needles or razors.  We discussed that we will do further lab evaluation to determine genotype and then consider further treatment with possible Epclusa.  Patient is on multiple antiseizure medications.  We will also order abdominal ultrasound for imaging as well as elastography.  States that she has had a colonoscopy but that it was greater than 10 years ago.  We did discuss ordering a colonoscopy screening in the future.  Endorses constipation.  She does take a stool softener daily.  No hematochezia or melena.     Medical records reviewed. Additional history supplemented by nursing.     Past Medical History:   Diagnosis Date    Aneurysm     Arthritis     Chronic migraine without aura, intractable, without status migrainosus     Epilepsy characterized by intractable complex partial seizures     GERD (gastroesophageal reflux disease)     Headache     Hydrocephalus     Hypertension          Review of Systems  General ROS: negative for chills, fever or weight  "loss  Cardiovascular ROS: no chest pain or dyspnea on exertion  Gastrointestinal ROS: no abdominal pain, change in bowel habits, or black/ bloody stools    Physical Examination  /64   Pulse 75   Ht 5' 2" (1.575 m)   Wt 66.6 kg (146 lb 12.8 oz)   SpO2 97%   BMI 26.85 kg/m²   General appearance: alert, cooperative, no distress  HENT: Normocephalic, atraumatic, neck symmetrical, no nasal discharge   Lungs: no labored breathing, symmetric chest wall expansion bilaterally  Heart: regular rate and rhythm without rub; no displacement of the PMI   Abdomen: soft, non-tender; bowel sounds normoactive; no organomegaly    Labs:  Lab Results   Component Value Date    WBC 6.31 04/23/2024    HGB 12.7 04/23/2024    HCT 38.0 04/23/2024    MCV 96.9 (H) 04/23/2024     04/23/2024       CMP  Sodium   Date Value Ref Range Status   04/23/2024 139 136 - 145 mmol/L Final     Potassium   Date Value Ref Range Status   04/23/2024 4.5 3.5 - 5.1 mmol/L Final     Chloride   Date Value Ref Range Status   04/23/2024 103 98 - 107 mmol/L Final     CO2   Date Value Ref Range Status   04/23/2024 29 21 - 32 mmol/L Final     Glucose   Date Value Ref Range Status   04/23/2024 78 74 - 106 mg/dL Final     BUN   Date Value Ref Range Status   04/23/2024 13 7 - 18 mg/dL Final     Creatinine   Date Value Ref Range Status   04/23/2024 0.67 0.55 - 1.02 mg/dL Final     Calcium   Date Value Ref Range Status   04/23/2024 8.8 8.5 - 10.1 mg/dL Final     Total Protein   Date Value Ref Range Status   04/23/2024 6.9 6.4 - 8.2 g/dL Final     Albumin   Date Value Ref Range Status   04/23/2024 2.9 (L) 3.5 - 5.0 g/dL Final     Bilirubin, Total   Date Value Ref Range Status   04/23/2024 0.5 >0.0 - 1.2 mg/dL Final     Alk Phos   Date Value Ref Range Status   04/23/2024 95 39 - 100 U/L Final     AST   Date Value Ref Range Status   04/23/2024 47 (H) 15 - 37 U/L Final     ALT   Date Value Ref Range Status   04/23/2024 39 13 - 56 U/L Final     Anion Gap   Date " Value Ref Range Status   04/23/2024 12 7 - 16 mmol/L Final     eGFR    Date Value Ref Range Status   03/20/2020 117       Comment:     The above 10 analytes were performed by [facility] [id number] [place] [address],[place] [id number]     eGFR   Date Value Ref Range Status   08/04/2021 74 >=60 mL/min/1.73m² Final       Imaging:  US of liver with elastography    Independently reviewed    Assessment: Brian Duong 49 yo WF here with:  Chronic hepatitis-C  Chronic constipation    Plan:  Liver labs, hep C testing with genotype  Ultrasound of liver with elastography  Follow-up 6 weeks    30 minutes of total time spent on the encounter, which includes face to face time and non-face to face time preparing to see the patient (eg, review of tests), obtaining and/or reviewing separately obtained history, documenting clinical information in the electronic or other health record, Independently interpreting results (not separately reported) and communicating results to the patient/family/caregiver, or care coordination (not separately reported).         Carla Adams, FNP [facility]

## 2024-05-31 ENCOUNTER — EXTERNAL CHRONIC CARE MANAGEMENT (OUTPATIENT)
Dept: FAMILY MEDICINE | Facility: CLINIC | Age: 49
End: 2024-05-31
Payer: MEDICARE

## 2024-05-31 PROCEDURE — G0511 CCM/BHI BY RHC/FQHC 20MIN MO: HCPCS | Mod: ,,, | Performed by: FAMILY MEDICINE

## 2024-06-01 NOTE — DISCHARGE INSTRUCTIONS
Voltaren gel to left rib area. Deep breathing. See Pcp if symptoms persist or continue.    Morena Nieves

## 2024-06-03 ENCOUNTER — TELEPHONE (OUTPATIENT)
Dept: GASTROENTEROLOGY | Facility: CLINIC | Age: 49
End: 2024-06-03
Payer: MEDICARE

## 2024-06-03 NOTE — TELEPHONE ENCOUNTER
Called and notified patients mother, who is care taker, that we had all the labs back that we needed to treat the patient for Hep C, and to make sure she keeps her rescheduled appt for the ultrasound for 6/10 so we can send with the labs to get her treatment started. Mother verbalized understanding.              ----- Message from Kandice Nair NP sent at 5/31/2024  2:25 PM CDT -----  Please make sure we have gotten the US rescheduled. She needs to have those done before I can prescribed the Epculsa.

## 2024-06-10 ENCOUNTER — HOSPITAL ENCOUNTER (OUTPATIENT)
Dept: RADIOLOGY | Facility: HOSPITAL | Age: 49
Discharge: HOME | End: 2024-06-10
Payer: MEDICARE

## 2024-06-10 DIAGNOSIS — R76.8 HEPATITIS C ANTIBODY TEST POSITIVE: Chronic | ICD-10-CM

## 2024-06-10 PROCEDURE — 76705 ECHO EXAM OF ABDOMEN: CPT | Mod: 26,,, | Performed by: RADIOLOGY

## 2024-06-10 PROCEDURE — 76981 USE PARENCHYMA: CPT | Mod: 26,,, | Performed by: RADIOLOGY

## 2024-06-10 PROCEDURE — 76705 ECHO EXAM OF ABDOMEN: CPT | Mod: TC

## 2024-06-10 PROCEDURE — 76981 USE PARENCHYMA: CPT | Mod: TC

## 2024-06-11 ENCOUNTER — TELEPHONE (OUTPATIENT)
Dept: NEUROLOGY | Facility: CLINIC | Age: 49
End: 2024-06-11
Payer: MEDICARE

## 2024-06-11 DIAGNOSIS — G40.309 NONINTRACTABLE GENERALIZED IDIOPATHIC EPILEPSY WITHOUT STATUS EPILEPTICUS: ICD-10-CM

## 2024-06-11 RX ORDER — LAMOTRIGINE 200 MG/1
200 TABLET ORAL 2 TIMES DAILY
Qty: 180 TABLET | Refills: 1 | Status: SHIPPED | OUTPATIENT
Start: 2024-06-11

## 2024-06-11 NOTE — TELEPHONE ENCOUNTER
Called pt to let her know medication was sent to pharmacy.          ----- Message from Adenike Weeks sent at 6/11/2024  9:25 AM CDT -----  Pt needs refill on lamoTRIgine (LAMICTAL) 200 MG tablet to [facility].   Who Called: Brian Duong    Refill or New Rx:Refill  RX Name and Strength:  How is the patient currently taking it? (ex. 1XDay):  Is this a 30 day or 90 day RX:  Local or Mail Order:  List of preferred pharmacies on file (remove unneeded): [unfilled]  If different Pharmacy is requested, enter Pharmacy information here including location and phone number:    Ordering Provider:      Preferred Method of Contact: Phone Call  Patient's Preferred Phone Number on File: [phone]   Best Call Back Number, if different:  Additional Information:

## 2024-06-19 DIAGNOSIS — R76.8 HEPATITIS C ANTIBODY TEST POSITIVE: Primary | ICD-10-CM

## 2024-06-19 RX ORDER — SOFOSBUVIR, VELPATASVIR, AND VOXILAPREVIR 400; 100; 100 MG/1; MG/1; MG/1
1 TABLET, FILM COATED ORAL DAILY
Qty: 90 TABLET | Refills: 0 | OUTPATIENT
Start: 2024-06-19

## 2024-06-24 ENCOUNTER — TELEPHONE (OUTPATIENT)
Dept: NEUROLOGY | Facility: CLINIC | Age: 49
End: 2024-06-24
Payer: MEDICARE

## 2024-06-24 NOTE — PROGRESS NOTES
I have sent her medicine for her Hepatitis C. She should be getting it soon in the mail. She will need to let us know when she starts the medication and will need a follow up appt with in 1mo of starting the medication and then follow up after completeion 2 mos later and will recheck her labs.

## 2024-06-24 NOTE — TELEPHONE ENCOUNTER
Called Juanis told her we have not sent a rx in here for pt. She v/u.          ----- Message from Alisson Miguel sent at 6/21/2024  2:13 PM CDT -----  Regarding: PRESCRIPTION  Who Called: JUANIS ROWE, SISTER        Who Left Message for Patient:  Does the patient know what this is regarding?:NEW PRESCRIPTION FOR THE PATIENT      Preferred Method of Contact: Phone Call  Patient's Preferred Phone Number on File: [phone]   Best Call Back Number, if different:[phone] SISTER, JUANIS ROWE  Additional Information: SAYS SOMEONE CALLED HER DAD ABOUT A NEW PRESCRIPTION FOR ALLYSON. WOULD LIKE TO SPEAK WITH THE NURSE.

## 2024-06-25 ENCOUNTER — TELEPHONE (OUTPATIENT)
Dept: GASTROENTEROLOGY | Facility: CLINIC | Age: 49
End: 2024-06-25
Payer: MEDICARE

## 2024-06-25 NOTE — TELEPHONE ENCOUNTER
Attempted to call results. No answer or voicemail noted. Patient has office visit scheduled for thurs 6/27/24.              ----- Message from Kandice Nair NP sent at 6/24/2024  6:04 PM CDT -----  I have sent her medicine for her Hepatitis C. She should be getting it soon in the mail. She will need to let us know when she starts the medication and will need a follow up appt with in 1mo of starting the medication and then follow up after completeion 2 mos later and will recheck her labs.

## 2024-07-01 DIAGNOSIS — B18.2 CHRONIC HEPATITIS C VIRUS GENOTYPE 1A INFECTION: ICD-10-CM

## 2024-07-01 DIAGNOSIS — R76.8 HEPATITIS C ANTIBODY TEST POSITIVE: Primary | ICD-10-CM

## 2024-07-01 RX ORDER — VELPATASVIR AND SOFOSBUVIR 100; 400 MG/1; MG/1
1 TABLET, FILM COATED ORAL DAILY
Qty: 90 TABLET | Refills: 0 | OUTPATIENT
Start: 2024-07-01

## 2024-07-19 ENCOUNTER — TELEPHONE (OUTPATIENT)
Dept: GASTROENTEROLOGY | Facility: CLINIC | Age: 49
End: 2024-07-19
Payer: MEDICARE

## 2024-07-19 NOTE — TELEPHONE ENCOUNTER
Called and spoke with patient's mother, who is caretaker, to let her know that the specialty pharmacy has been trying to reach her to get the patients Epclusa shipped to her. Number given to mother to call and speak to specialty pharmacy to get this taken care of.               ----- Message from Kandice Nair NP sent at 7/19/2024  8:22 AM CDT -----  Can we please try to get in touch with this patient about sending the Epclusa? She still has not started her treatment  ----- Message -----  From: Norma Fisher, PharmD  Sent: 7/18/2024  10:43 AM CDT  To: Kandice Nair NP

## 2024-07-25 DIAGNOSIS — K21.9 GASTROESOPHAGEAL REFLUX DISEASE, UNSPECIFIED WHETHER ESOPHAGITIS PRESENT: Primary | ICD-10-CM

## 2024-07-25 RX ORDER — FAMOTIDINE 20 MG/1
20 TABLET, FILM COATED ORAL 2 TIMES DAILY
Qty: 180 TABLET | Refills: 3 | Status: SHIPPED | OUTPATIENT
Start: 2024-07-25 | End: 2025-07-25

## 2024-08-28 ENCOUNTER — HOSPITAL ENCOUNTER (OUTPATIENT)
Dept: RADIOLOGY | Facility: HOSPITAL | Age: 49
Discharge: HOME | End: 2024-08-28
Payer: MEDICARE

## 2024-08-28 VITALS — BODY MASS INDEX: 28.35 KG/M2 | WEIGHT: 155 LBS

## 2024-08-28 DIAGNOSIS — Z12.31 ENCOUNTER FOR SCREENING MAMMOGRAM FOR MALIGNANT NEOPLASM OF BREAST: ICD-10-CM

## 2024-08-28 PROCEDURE — 77067 SCR MAMMO BI INCL CAD: CPT | Mod: TC

## 2024-09-19 ENCOUNTER — OFFICE VISIT (OUTPATIENT)
Dept: NEUROLOGY | Facility: CLINIC | Age: 49
End: 2024-09-19
Payer: MEDICARE

## 2024-09-19 VITALS
RESPIRATION RATE: 18 BRPM | DIASTOLIC BLOOD PRESSURE: 62 MMHG | SYSTOLIC BLOOD PRESSURE: 115 MMHG | WEIGHT: 151.63 LBS | HEIGHT: 62 IN | BODY MASS INDEX: 27.9 KG/M2 | OXYGEN SATURATION: 99 % | HEART RATE: 70 BPM

## 2024-09-19 DIAGNOSIS — G40.309 NONINTRACTABLE GENERALIZED IDIOPATHIC EPILEPSY WITHOUT STATUS EPILEPTICUS: Primary | ICD-10-CM

## 2024-09-19 DIAGNOSIS — Z45.42 ENCOUNTER FOR FITTING AND ADJUSTMENT OF VAGUS NERVE STIMULATOR: ICD-10-CM

## 2024-09-19 DIAGNOSIS — F41.9 ANXIETY: ICD-10-CM

## 2024-09-19 DIAGNOSIS — Z79.899 ENCOUNTER FOR LONG-TERM CURRENT USE OF MEDICATION: ICD-10-CM

## 2024-09-19 DIAGNOSIS — F32.A DEPRESSION, UNSPECIFIED DEPRESSION TYPE: ICD-10-CM

## 2024-09-19 PROCEDURE — 99213 OFFICE O/P EST LOW 20 MIN: CPT | Mod: 25,S$PBB,, | Performed by: NURSE PRACTITIONER

## 2024-09-19 PROCEDURE — 99215 OFFICE O/P EST HI 40 MIN: CPT | Mod: PBBFAC,25 | Performed by: NURSE PRACTITIONER

## 2024-09-19 PROCEDURE — 95970 ALYS NPGT W/O PRGRMG: CPT | Mod: S$PBB,,, | Performed by: NURSE PRACTITIONER

## 2024-09-19 PROCEDURE — 99999 PR PBB SHADOW E&M-EST. PATIENT-LVL V: CPT | Mod: PBBFAC,,, | Performed by: NURSE PRACTITIONER

## 2024-09-19 PROCEDURE — 95970 ALYS NPGT W/O PRGRMG: CPT | Mod: PBBFAC | Performed by: NURSE PRACTITIONER

## 2024-09-19 RX ORDER — OMEPRAZOLE 20 MG/1
20 CAPSULE, DELAYED RELEASE ORAL
COMMUNITY

## 2024-09-19 NOTE — PATIENT INSTRUCTIONS
1. Continue the current keppra and lamictal as dircted  2. Lamictal level and CMP  3. Continue the celexa 40mg daily  4. Continue the once monthly Aimovig injections as directed  5. Will refer for VNS battery replacement      Seizure Precautions:  1. Take medications as directed  2. Avoid open flames and hot surfaces such as fires and grills  3. Avoid elevations such as ladders or stools  4. Avoid swimming alone  5. Make sure getting plenty of sleep, recommend 7-8 hours per day  6. Avoid alcohol and illicit drugs  7. No driving or operating heavy machinery for 6 months after last known seizure

## 2024-09-19 NOTE — PROGRESS NOTES
"    Subjective:       Patient ID: Brian Duong is a 49 y.o. female     Chief Complaint:    No chief complaint on file.           History of Present Illness  48 y/o WF followed in clinic for history of epilepsy, s/p VNS, hydrocephalus with history of  shunt placement >15 years ago, and headaches.  Family present with her.    Last visit there were reports of patient with dizziness, blacking out, ambulatory difficulty.  Labs were obtain, her lamictal level was 27, prior level on same dosing in 2022 was 13, but with elevated level concern about med contribution to symptoms, so I reduced dosing to 200mg bid (prior taking 300mg bid), need to repeat levels.  She is also on keppra 1000mg bid.  They report about 4 possible seizures since last visit, this is up compared to usual 1-2, need to get repeat trough on the lamictal, but has already taken this morning.  I will place order, she will do this at her local clinic    There is some component of psychiatric to this as well, she was reporting she "could not see" and "could not move anything" and acted like she was not seeing me but had appropriate reflex respond on eye exam and family reported that patient had ambulated to the car with her walker just fine at home and up to the floor for the clinic visit.  She also STATED she could not stick her tongue out, but was able to speak without difficulty.    I checked her VNS today, we did not make any changes to her settings.  Battery life listed now is 11-25% so will make referral to setup battery replacement.    Headaches are reported as very well.  This was greatly improved after we got her started on the Aimovig injections.     She is also on Celexa for depression and denies any worsening in mood to me, is also on abilify           Review of Systems  Review of Systems   Constitutional:  Negative for diaphoresis and fever.   HENT:  Negative for congestion, hearing loss and tinnitus.    Eyes:  Negative for blurred vision, " double vision, photophobia, discharge and redness.   Respiratory:  Negative for cough and shortness of breath.    Cardiovascular:  Negative for chest pain.   Gastrointestinal:  Negative for abdominal pain, nausea and vomiting.   Musculoskeletal:  Negative for back pain, falls, joint pain, myalgias and neck pain.   Skin:  Negative for itching and rash.   Neurological:  Positive for seizures and headaches. Negative for dizziness, tremors, sensory change, speech change, focal weakness, loss of consciousness and weakness.   Psychiatric/Behavioral:  Positive for depression and memory loss. Negative for hallucinations. The patient does not have insomnia.    All other systems reviewed and are negative.     Objective:     NEUROLOGICAL EXAMINATION:     MENTAL STATUS   Oriented to person, place, and time.   Registration: recalls 3 of 3 objects. Recall at 5 minutes: recalls 3 of 3 objects.   Attention: normal. Concentration: normal.   Speech: speech is normal   Level of consciousness: alert  Knowledge: good and consistent with education.   Normal comprehension.     CRANIAL NERVES     CN II   Visual fields full to confrontation.   Visual acuity: normal  Right visual field deficit: none  Left visual field deficit: none     CN III, IV, VI   Pupils are equal, round, and reactive to light.  Extraocular motions are normal.   Right pupil: Size: 3 mm. Shape: regular. Reactivity: brisk. Consensual response: intact. Accommodation: intact.   Left pupil: Size: 3 mm. Shape: regular. Reactivity: brisk. Consensual response: intact. Accommodation: intact.   CN III: no CN III palsy  CN VI: no CN VI palsy  Nystagmus: none   Diplopia: none  Upgaze: normal  Downgaze: normal  Conjugate gaze: present  Vestibulo-ocular reflex: present    CN V   Facial sensation intact.   Right facial sensation deficit: none  Left facial sensation deficit: none  Right corneal reflex: normal  Left corneal reflex: normal    CN VII   Facial expression full, symmetric.    Right facial weakness: none  Left facial weakness: none  Right taste: normal  Left taste: normal    CN VIII   CN VIII normal.   Hearing: intact    CN IX, X   CN IX normal.   CN X normal.   Palate: symmetric    CN XI   CN XI normal.   Right sternocleidomastoid strength: normal  Left sternocleidomastoid strength: normal  Right trapezius strength: normal  Left trapezius strength: normal    CN XII   CN XII normal.   Tongue: not atrophic  Fasciculations: absent  Tongue deviation: none    MOTOR EXAM   Muscle bulk: normal  Overall muscle tone: normal  Right arm tone: normal  Left arm tone: increased  Right arm pronator drift: absent  Left arm pronator drift: absent  Right leg tone: normal  Left leg tone: increased    Strength   Right neck flexion: 5/5  Left neck flexion: 5/5  Right neck extension: 5/5  Left neck extension: 5/5  Right deltoid: 5/5  Left deltoid: 4/5  Right biceps: 5/5  Left biceps: 4/5  Right triceps: 5/5  Left triceps: 4/5  Right wrist flexion: 5/5  Left wrist flexion: 4/5  Right wrist extension: 5/5  Left wrist extension: 4/5  Right interossei: 5/5  Left interossei: 4/5  Right iliopsoas: 5/5  Left iliopsoas: 4/5  Right quadriceps: 5/5  Left quadriceps: 4/5  Right hamstrin/5  Left hamstrin/5  Right anterior tibial: 5/5  Left anterior tibial: 4/5  Right posterior tibial: 5/5  Left posterior tibial: 4/5  Right gastroc: 5/5  Left gastroc: 4/5    REFLEXES     Reflexes   Right brachioradialis: 2+  Left brachioradialis: 2+  Right biceps: 2+  Left biceps: 2+  Right triceps: 2+  Left triceps: 2+  Right patellar: 2+  Left patellar: 2+  Right achilles: 2+  Left achilles: 2+  Right plantar: normal  Left plantar: normal  Right Hardin: absent  Left Hardin: absent  Right ankle clonus: absent  Left ankle clonus: absent  Right pendular knee jerk: absent  Left pendular knee jerk: absent    SENSORY EXAM   Light touch normal.   Right arm light touch: normal  Left arm light touch: normal  Right leg light touch:  normal  Left leg light touch: normal  Vibration normal.   Right arm vibration: normal  Left arm vibration: normal  Right leg vibration: normal  Left leg vibration: normal  Proprioception normal.   Right arm proprioception: normal  Left arm proprioception: normal  Right leg proprioception: normal  Left leg proprioception: normal  Pinprick normal.   Right arm pinprick: normal  Left arm pinprick: normal  Right leg pinprick: normal  Left leg pinprick: normal  Graphesthesia: normal  Romberg: negative  Stereognosis: normal    GAIT AND COORDINATION      Coordination   Finger to nose coordination: abnormal  Heel to shin coordination: abnormal  Tandem walking coordination: abnormal    Tremor   Resting tremor: absent  Intention tremor: absent  Action tremor: absent       Physical Exam  Vitals and nursing note reviewed.   Constitutional:       Appearance: Normal appearance.   HENT:      Head: Normocephalic.   Eyes:      Extraocular Movements: Extraocular movements intact and EOM normal.      Pupils: Pupils are equal, round, and reactive to light.   Cardiovascular:      Rate and Rhythm: Normal rate and regular rhythm.   Pulmonary:      Effort: Pulmonary effort is normal.      Breath sounds: Normal breath sounds.   Musculoskeletal:         General: No swelling or tenderness. Normal range of motion.      Cervical back: Normal range of motion and neck supple.      Right lower leg: No edema.      Left lower leg: No edema.   Skin:     General: Skin is warm and dry.      Coloration: Skin is not jaundiced.      Findings: No rash.   Neurological:      General: No focal deficit present.      Mental Status: She is alert and oriented to person, place, and time. Mental status is at baseline.      GCS: GCS eye subscore is 4. GCS verbal subscore is 5. GCS motor subscore is 6.      Cranial Nerves: No cranial nerve deficit.      Sensory: No sensory deficit.      Motor: Motor function is intact. No weakness.      Coordination: Coordination  abnormal. Finger-Nose-Finger Test abnormal and Heel to Shin Test abnormal. Romberg Test normal.      Gait: Gait abnormal and tandem walk abnormal.      Deep Tendon Reflexes: Reflexes normal.      Reflex Scores:       Tricep reflexes are 2+ on the right side and 2+ on the left side.       Bicep reflexes are 2+ on the right side and 2+ on the left side.       Brachioradialis reflexes are 2+ on the right side and 2+ on the left side.       Patellar reflexes are 2+ on the right side and 2+ on the left side.       Achilles reflexes are 2+ on the right side and 2+ on the left side.  Psychiatric:         Mood and Affect: Mood normal.         Speech: Speech normal.         Behavior: Behavior normal.          Assessment:     Problem List Items Addressed This Visit          Neuro    Nonintractable generalized idiopathic epilepsy without status epilepticus - Primary (Chronic)       Psychiatric    Anxiety (Chronic)    Depression       Other    Encounter for long-term current use of medication    Relevant Orders    Lamotrigine Level    Comprehensive Metabolic Panel                  Plan:     1. Need labs next visit  2. VNS device evaluated, no changes were made  3. Continue the lamictal and keppra as directed  4. Notify clinic if any further seizures, as will need to obtain labs    Seizure precautions:  1. Take medications as directed  2. Avoid open flames and hot surfaces  3. Avoid elevations  4. Avoid swimming alone  5. Make sure getting plenty of sleep  6. Avoid alcohol  7. No driving or operating heavy machinery for 6 months after last known seizure

## 2024-09-25 ENCOUNTER — OFFICE VISIT (OUTPATIENT)
Dept: FAMILY MEDICINE | Facility: CLINIC | Age: 49
End: 2024-09-25
Payer: MEDICARE

## 2024-09-25 VITALS
HEIGHT: 62 IN | BODY MASS INDEX: 28.82 KG/M2 | HEART RATE: 71 BPM | DIASTOLIC BLOOD PRESSURE: 73 MMHG | OXYGEN SATURATION: 98 % | RESPIRATION RATE: 16 BRPM | TEMPERATURE: 98 F | WEIGHT: 156.63 LBS | SYSTOLIC BLOOD PRESSURE: 107 MMHG

## 2024-09-25 DIAGNOSIS — B18.2 CHRONIC HEPATITIS C VIRUS GENOTYPE 1A INFECTION: Chronic | ICD-10-CM

## 2024-09-25 DIAGNOSIS — Z23 NEED FOR IMMUNIZATION AGAINST INFLUENZA: ICD-10-CM

## 2024-09-25 DIAGNOSIS — Z79.899 ENCOUNTER FOR LONG-TERM CURRENT USE OF MEDICATION: ICD-10-CM

## 2024-09-25 DIAGNOSIS — N92.1 MENOMETRORRHAGIA: Primary | Chronic | ICD-10-CM

## 2024-09-25 DIAGNOSIS — G40.909 SEIZURE DISORDER: Chronic | ICD-10-CM

## 2024-09-25 DIAGNOSIS — E78.2 MIXED HYPERLIPIDEMIA: Chronic | ICD-10-CM

## 2024-09-25 DIAGNOSIS — E80.20 PORPHYRIA, UNSPECIFIED PORPHYRIA TYPE: Chronic | ICD-10-CM

## 2024-09-25 LAB
ALBUMIN SERPL BCP-MCNC: 3 G/DL (ref 3.5–5)
ALBUMIN/GLOB SERPL: 0.8 {RATIO}
ALP SERPL-CCNC: 82 U/L (ref 39–100)
ALT SERPL W P-5'-P-CCNC: 11 U/L (ref 13–56)
ANION GAP SERPL CALCULATED.3IONS-SCNC: 7 MMOL/L (ref 7–16)
AST SERPL W P-5'-P-CCNC: 11 U/L (ref 15–37)
BILIRUB SERPL-MCNC: 0.4 MG/DL (ref ?–1.2)
BUN SERPL-MCNC: 10 MG/DL (ref 7–18)
BUN/CREAT SERPL: 14 (ref 6–20)
CALCIUM SERPL-MCNC: 8.9 MG/DL (ref 8.5–10.1)
CHLORIDE SERPL-SCNC: 107 MMOL/L (ref 98–107)
CHOLEST SERPL-MCNC: 186 MG/DL (ref 0–200)
CHOLEST/HDLC SERPL: 3.3 {RATIO}
CO2 SERPL-SCNC: 30 MMOL/L (ref 21–32)
CREAT SERPL-MCNC: 0.7 MG/DL (ref 0.55–1.02)
EGFR (NO RACE VARIABLE) (RUSH/TITUS): 107 ML/MIN/1.73M2
GLOBULIN SER-MCNC: 3.7 G/DL (ref 2–4)
GLUCOSE SERPL-MCNC: 74 MG/DL (ref 74–106)
HDLC SERPL-MCNC: 56 MG/DL (ref 40–60)
LDLC SERPL CALC-MCNC: 113 MG/DL
LDLC/HDLC SERPL: 2 {RATIO}
NONHDLC SERPL-MCNC: 130 MG/DL
POTASSIUM SERPL-SCNC: 4 MMOL/L (ref 3.5–5.1)
PROT SERPL-MCNC: 6.7 G/DL (ref 6.4–8.2)
SODIUM SERPL-SCNC: 140 MMOL/L (ref 136–145)
TRIGL SERPL-MCNC: 83 MG/DL (ref 35–150)
TSH SERPL DL<=0.005 MIU/L-ACNC: 1.15 UIU/ML (ref 0.36–3.74)
VLDLC SERPL-MCNC: 17 MG/DL

## 2024-09-25 PROCEDURE — G0008 ADMIN INFLUENZA VIRUS VAC: HCPCS | Mod: ,,, | Performed by: FAMILY MEDICINE

## 2024-09-25 PROCEDURE — 99214 OFFICE O/P EST MOD 30 MIN: CPT | Mod: ,,, | Performed by: FAMILY MEDICINE

## 2024-09-25 PROCEDURE — 90656 IIV3 VACC NO PRSV 0.5 ML IM: CPT | Mod: ,,, | Performed by: FAMILY MEDICINE

## 2024-09-25 PROCEDURE — 80053 COMPREHEN METABOLIC PANEL: CPT | Mod: ,,, | Performed by: CLINICAL MEDICAL LABORATORY

## 2024-09-25 PROCEDURE — 80061 LIPID PANEL: CPT | Mod: ,,, | Performed by: CLINICAL MEDICAL LABORATORY

## 2024-09-25 PROCEDURE — 84443 ASSAY THYROID STIM HORMONE: CPT | Mod: ,,, | Performed by: CLINICAL MEDICAL LABORATORY

## 2024-10-23 DIAGNOSIS — F33.1 MODERATE EPISODE OF RECURRENT MAJOR DEPRESSIVE DISORDER: Chronic | ICD-10-CM

## 2024-10-31 ENCOUNTER — EXTERNAL CHRONIC CARE MANAGEMENT (OUTPATIENT)
Dept: FAMILY MEDICINE | Facility: CLINIC | Age: 49
End: 2024-10-31
Payer: MEDICARE

## 2024-10-31 PROCEDURE — G0511 CCM/BHI BY RHC/FQHC 20MIN MO: HCPCS | Mod: ,,, | Performed by: FAMILY MEDICINE

## 2024-11-05 PROBLEM — E80.20 PORPHYRIA: Status: ACTIVE | Noted: 2024-11-05

## 2024-11-05 RX ORDER — ARIPIPRAZOLE 5 MG/1
5 TABLET ORAL
Qty: 90 TABLET | Refills: 3 | Status: SHIPPED | OUTPATIENT
Start: 2024-11-05

## 2024-11-05 NOTE — PROGRESS NOTES
Subjective     Patient ID: Brian Duong is a 49 y.o. female.    Chief Complaint: Follow-up (6 Month Follow Up )    48 yo WF here for check up and lab. Having heavy irregular bleeding again.    Follow-up  Pertinent negatives include no abdominal pain, chest pain or headaches.     Review of Systems   Constitutional:  Negative for activity change.   Eyes:  Negative for visual disturbance.   Respiratory:  Negative for shortness of breath.    Cardiovascular:  Negative for chest pain.   Gastrointestinal:  Negative for abdominal pain.   Genitourinary:  Positive for menstrual problem.   Neurological:  Negative for headaches.   Psychiatric/Behavioral:  Negative for dysphoric mood.      Office Visit on 09/25/2024   Component Date Value Ref Range Status    Triglycerides 09/25/2024 83  35 - 150 mg/dL Final      Normal:  <150 mg/dL  Borderline High: 150-199 mg/dL  High:   200-499 mg/dL  Very High:  >=500    Cholesterol 09/25/2024 186  0 - 200 mg/dL Final      <200 mg/dL:  Desirable  200-240 mg/dL: Borderline High  >240 mg/dL:  High    HDL Cholesterol 09/25/2024 56  40 - 60 mg/dL Final      <40 mg/dL: Low HDL  40-60 mg/dL: Normal  >60 mg/dL: Desirable    Cholesterol/HDL Ratio (Risk Factor) 09/25/2024 3.3   Final    Non-HDL 09/25/2024 130  mg/dL Final    LDL Calculated 09/25/2024 113  mg/dL Final    Unable to calculate due to one of the following values:  Cholesterol <5  HDL Cholesterol <5  Triglycerides <10 or >400    LDL/HDL 09/25/2024 2.0   Final    Unable to calculate due to one of the following values:  Cholesterol <5  HDL Cholesterol <5  Triglycerides <10 or >400    VLDL 09/25/2024 17  mg/dL Final    TSH 09/25/2024 1.150  0.358 - 3.740 uIU/mL Final    Sodium 09/25/2024 140  136 - 145 mmol/L Final    Potassium 09/25/2024 4.0  3.5 - 5.1 mmol/L Final    Chloride 09/25/2024 107  98 - 107 mmol/L Final    CO2 09/25/2024 30  21 - 32 mmol/L Final    Anion Gap 09/25/2024 7  7 - 16 mmol/L Final    Glucose 09/25/2024 74  74 - 106 mg/dL  "Final    BUN 09/25/2024 10  7 - 18 mg/dL Final    Creatinine 09/25/2024 0.70  0.55 - 1.02 mg/dL Final    BUN/Creatinine Ratio 09/25/2024 14  6 - 20 Final    Calcium 09/25/2024 8.9  8.5 - 10.1 mg/dL Final    Total Protein 09/25/2024 6.7  6.4 - 8.2 g/dL Final    Albumin 09/25/2024 3.0 (L)  3.5 - 5.0 g/dL Final    Globulin 09/25/2024 3.7  2.0 - 4.0 g/dL Final    A/G Ratio 09/25/2024 0.8   Final    Bilirubin, Total 09/25/2024 0.4  >0.0 - 1.2 mg/dL Final    Alk Phos 09/25/2024 82  39 - 100 U/L Final    ALT 09/25/2024 11 (L)  13 - 56 U/L Final    AST 09/25/2024 11 (L)  15 - 37 U/L Final    eGFR 09/25/2024 107  >=60 mL/min/1.73m2 Final          Objective   Blood pressure 107/73, pulse 71, temperature 97.7 °F (36.5 °C), temperature source Oral, resp. rate 16, height 5' 2" (1.575 m), weight 71 kg (156 lb 9.6 oz), SpO2 98%.    Physical Exam  Constitutional:       Appearance: Normal appearance.   HENT:      Head: Normocephalic and atraumatic.      Right Ear: External ear normal.      Left Ear: External ear normal.      Nose: Nose normal.      Mouth/Throat:      Mouth: Mucous membranes are moist.   Eyes:      Conjunctiva/sclera: Conjunctivae normal.      Pupils: Pupils are equal, round, and reactive to light.   Cardiovascular:      Rate and Rhythm: Normal rate and regular rhythm.      Pulses: Normal pulses.      Heart sounds: Normal heart sounds.   Pulmonary:      Effort: Pulmonary effort is normal.      Breath sounds: Normal breath sounds.   Abdominal:      General: Abdomen is flat.      Palpations: Abdomen is soft.   Musculoskeletal:         General: Normal range of motion.      Cervical back: Normal range of motion and neck supple.   Skin:     General: Skin is warm and dry.   Neurological:      Mental Status: She is alert and oriented to person, place, and time. Mental status is at baseline.   Psychiatric:         Mood and Affect: Mood normal.         Behavior: Behavior normal.         Thought Content: Thought content " normal.         Judgment: Judgment normal.            Assessment and Plan     1. Menometrorrhagia  -     Ambulatory referral/consult to Obstetrics / Gynecology; Future; Expected date: 10/02/2024    2. Mixed hyperlipidemia  -     Lipid Panel  -     TSH    3. Encounter for long-term current use of medication  -     Cancel: Lamotrigine Level  -     Comprehensive Metabolic Panel  -     Cancel: Lamotrigine Level; Future; Expected date: 09/25/2024  -     Lamotrigine Level; Future; Expected date: 10/02/2024    4. Need for immunization against influenza  -     influenza (Flulaval, Fluzone, Fluarix) 45 mcg/0.5 mL IM vaccine (> or = 6 mo) 0.5 mL    5. Seizure disorder  -     Cancel: Lamotrigine Level; Future; Expected date: 09/25/2024  -     Lamotrigine Level; Future; Expected date: 10/02/2024    6. Porphyria, unspecified porphyria type    7. Chronic hepatitis C virus genotype 1a infection                   Follow up in about 6 months (around 3/25/2025) for for recheck and will decide about lab. .

## 2024-11-06 ENCOUNTER — OFFICE VISIT (OUTPATIENT)
Dept: GASTROENTEROLOGY | Facility: CLINIC | Age: 49
End: 2024-11-06
Payer: MEDICARE

## 2024-11-06 VITALS
HEIGHT: 62 IN | BODY MASS INDEX: 28.31 KG/M2 | RESPIRATION RATE: 18 BRPM | OXYGEN SATURATION: 98 % | HEART RATE: 73 BPM | WEIGHT: 153.81 LBS | DIASTOLIC BLOOD PRESSURE: 72 MMHG | SYSTOLIC BLOOD PRESSURE: 117 MMHG

## 2024-11-06 DIAGNOSIS — Z12.11 SCREENING FOR MALIGNANT NEOPLASM OF COLON: Primary | ICD-10-CM

## 2024-11-06 PROCEDURE — 99999 PR PBB SHADOW E&M-EST. PATIENT-LVL V: CPT | Mod: PBBFAC,,,

## 2024-11-06 PROCEDURE — 99215 OFFICE O/P EST HI 40 MIN: CPT | Mod: PBBFAC

## 2024-11-06 PROCEDURE — 99214 OFFICE O/P EST MOD 30 MIN: CPT | Mod: S$PBB,,,

## 2024-11-06 NOTE — PROGRESS NOTES
CC:  Chronic hep C    HPI 49 y.o. female is here to follow up on completion of Epculsa treatment for Hep C.  Patient state she took her last dose of Epclusa last week.  Patient state she is doing well she did have lab work yesterday. Labs reviewed no anemia or elevated liver enzymes as of 11/5/24.  Patient denies any abdominal pain, nausea vomiting or dysphagia.  Denies any hematochezia or melena.  Patient does state that she does have menstrual cycle problems but she does see Dr. Shea for this.  Discussed with the patient and her sister that we will wait 12 weeks for test of cure that this is very important that the patient returns for this appointment.  Patient is sister verbalized understanding.  I did discuss with patient and sister about doing a colonoscopy on patient patient state she did not think she can handle the prep sister does not either instructed patient that we could order a Cologuard test patient and sister said that that would be something they would be willing to do.  I did instruct patient is history day but does calm positive in the we would automatically have to rule to a colonoscopy they both voiced understanding.  Interval  HPI 48 y.o. white female special needs she is positive for hepatitis-C and was seen back a year ago past AMY Zheng.  She was never able to get in touch with patient to have ultrasounds done we will repeat all liver labs genotype being for hepatitis-C and ultrasounds for this patient to start treatment.  Discussed this with stepmother day that this was very important to get done.  Patient denies any abdominal pain, hematochezia or melena at this time denies any nausea vomiting or dysphagia at this visit    LMP was 2 weeks ago  Medical records reviewed. Additional history supplemented by nursing.     Past Medical History:   Diagnosis Date    Aneurysm     Arthritis     Chronic migraine without aura, intractable, without status migrainosus     Epilepsy characterized  "by intractable complex partial seizures     GERD (gastroesophageal reflux disease)     Headache     Hydrocephalus     Hypertension          Review of Systems  General ROS: negative for chills, fever or weight loss  Cardiovascular ROS: no chest pain or dyspnea on exertion  Gastrointestinal ROS: no abdominal pain, change in bowel habits, or black/ bloody stools    Physical Examination  /72   Pulse 73   Resp 18   Ht 5' 2" (1.575 m)   Wt 69.8 kg (153 lb 12.8 oz)   SpO2 98%   BMI 28.13 kg/m²   General appearance: alert, cooperative, no distress  HENT: Normocephalic, atraumatic, neck symmetrical, no nasal discharge   Lungs: no labored breathing, symmetric chest wall expansion bilaterally  Heart: regular rate and rhythm without rub; no displacement of the PMI   Abdomen: soft, non-tender; bowel sounds normoactive; no organomegaly    Labs:  Lab Results   Component Value Date    WBC 7.56 05/15/2024    HGB 13.2 05/15/2024    HCT 35.9 (L) 05/15/2024    .8 (H) 05/15/2024     05/15/2024       CMP  Sodium   Date Value Ref Range Status   09/25/2024 140 136 - 145 mmol/L Final     Potassium   Date Value Ref Range Status   09/25/2024 4.0 3.5 - 5.1 mmol/L Final     Chloride   Date Value Ref Range Status   09/25/2024 107 98 - 107 mmol/L Final     CO2   Date Value Ref Range Status   09/25/2024 30 21 - 32 mmol/L Final     Glucose   Date Value Ref Range Status   09/25/2024 74 74 - 106 mg/dL Final     BUN   Date Value Ref Range Status   09/25/2024 10 7 - 18 mg/dL Final     Creatinine   Date Value Ref Range Status   09/25/2024 0.70 0.55 - 1.02 mg/dL Final     Calcium   Date Value Ref Range Status   09/25/2024 8.9 8.5 - 10.1 mg/dL Final     Total Protein   Date Value Ref Range Status   09/25/2024 6.7 6.4 - 8.2 g/dL Final     Albumin   Date Value Ref Range Status   09/25/2024 3.0 (L) 3.5 - 5.0 g/dL Final     Bilirubin, Total   Date Value Ref Range Status   09/25/2024 0.4 >0.0 - 1.2 mg/dL Final     Alk Phos   Date " Value Ref Range Status   09/25/2024 82 39 - 100 U/L Final     AST   Date Value Ref Range Status   09/25/2024 11 (L) 15 - 37 U/L Final     ALT   Date Value Ref Range Status   09/25/2024 11 (L) 13 - 56 U/L Final     Anion Gap   Date Value Ref Range Status   09/25/2024 7 7 - 16 mmol/L Final     eGFR    Date Value Ref Range Status   03/20/2020 117       Comment:     The above 10 analytes were performed by [facility] [id number] [address] [place] [id number]     eGFR   Date Value Ref Range Status   08/04/2021 74 >=60 mL/min/1.73m² Final       Imaging:  No pertinent imaging    Independently reviewed    Assessment: Brian Duong 48 yo WF here with:   Completion of hep C treatment of Epclusa  Cologuard testing declines colonoscopy    Plan:  Cologuard testing ordered for screening colonoscopy  Patient to return in 3 months for test of cure for chronic hepatitis-C after completion treatment  Three-month follow-up or sooner    30 minutes of total time spent on the encounter, which includes face to face time and non-face to face time preparing to see the patient (eg, review of tests), obtaining and/or reviewing separately obtained history, documenting clinical information in the electronic or other health record, Independently interpreting results (not separately reported) and communicating results to the patient/family/caregiver, or care coordination (not separately reported).         Kandice Nair, AMY  [facility]

## 2024-11-30 ENCOUNTER — EXTERNAL CHRONIC CARE MANAGEMENT (OUTPATIENT)
Dept: FAMILY MEDICINE | Facility: CLINIC | Age: 49
End: 2024-11-30
Payer: MEDICARE

## 2024-11-30 DIAGNOSIS — G44.229 CHRONIC TENSION-TYPE HEADACHE, NOT INTRACTABLE: ICD-10-CM

## 2024-11-30 PROCEDURE — G0511 CCM/BHI BY RHC/FQHC 20MIN MO: HCPCS | Mod: ,,, | Performed by: FAMILY MEDICINE

## 2024-12-02 RX ORDER — ERENUMAB-AOOE 140 MG/ML
INJECTION, SOLUTION SUBCUTANEOUS
Qty: 1 ML | Refills: 11 | Status: SHIPPED | OUTPATIENT
Start: 2024-12-02

## 2024-12-31 ENCOUNTER — EXTERNAL CHRONIC CARE MANAGEMENT (OUTPATIENT)
Dept: FAMILY MEDICINE | Facility: CLINIC | Age: 49
End: 2024-12-31
Payer: MEDICARE

## 2024-12-31 PROCEDURE — G0511 CCM/BHI BY RHC/FQHC 20MIN MO: HCPCS | Mod: ,,, | Performed by: FAMILY MEDICINE

## 2025-01-20 DIAGNOSIS — G40.309 NONINTRACTABLE GENERALIZED IDIOPATHIC EPILEPSY WITHOUT STATUS EPILEPTICUS: ICD-10-CM

## 2025-01-27 RX ORDER — LAMOTRIGINE 200 MG/1
200 TABLET ORAL 2 TIMES DAILY
Qty: 180 TABLET | Refills: 1 | Status: SHIPPED | OUTPATIENT
Start: 2025-01-27

## 2025-01-31 ENCOUNTER — EXTERNAL CHRONIC CARE MANAGEMENT (OUTPATIENT)
Dept: FAMILY MEDICINE | Facility: CLINIC | Age: 50
End: 2025-01-31
Payer: MEDICARE

## 2025-01-31 PROCEDURE — G0511 CCM/BHI BY RHC/FQHC 20MIN MO: HCPCS | Mod: ,,, | Performed by: FAMILY MEDICINE

## 2025-02-04 ENCOUNTER — HOSPITAL ENCOUNTER (EMERGENCY)
Facility: HOSPITAL | Age: 50
Discharge: HOME | End: 2025-02-04
Payer: MEDICARE

## 2025-02-04 VITALS
DIASTOLIC BLOOD PRESSURE: 79 MMHG | OXYGEN SATURATION: 98 % | WEIGHT: 150 LBS | RESPIRATION RATE: 18 BRPM | TEMPERATURE: 99 F | BODY MASS INDEX: 27.6 KG/M2 | SYSTOLIC BLOOD PRESSURE: 127 MMHG | HEIGHT: 62 IN | HEART RATE: 79 BPM

## 2025-02-04 DIAGNOSIS — R05.9 COUGH, UNSPECIFIED TYPE: Primary | ICD-10-CM

## 2025-02-04 PROCEDURE — 99282 EMERGENCY DEPT VISIT SF MDM: CPT

## 2025-02-04 RX ORDER — FLUTICASONE PROPIONATE 50 MCG
1 SPRAY, SUSPENSION (ML) NASAL 2 TIMES DAILY PRN
Qty: 15 G | Refills: 0 | Status: SHIPPED | OUTPATIENT
Start: 2025-02-04

## 2025-02-04 RX ORDER — CETIRIZINE HYDROCHLORIDE 10 MG/1
10 TABLET ORAL DAILY
Qty: 30 TABLET | Refills: 0 | Status: SHIPPED | OUTPATIENT
Start: 2025-02-04 | End: 2026-02-04

## 2025-02-04 NOTE — DISCHARGE INSTRUCTIONS
Flonase and Zyrtec as prescribed and directed.  Follow up with your primary care provider in 2 days. Return to the emergency department for any increase in symptoms or for any other new or worrisome symptoms.

## 2025-02-04 NOTE — ED PROVIDER NOTES
Encounter Date: 2/4/2025       History     Chief Complaint   Patient presents with    Cough     49-year-old female presents to the emergency department to be evaluated for cough.  She reports cough, headache, congestion and sinus drainage for the past 1 week.  She denies any fever, nausea or vomiting.    The history is provided by the patient.   Cough  This is a new problem. The current episode started several days ago. There has been no fever. Associated symptoms include sore throat. Pertinent negatives include no chest pain and no shortness of breath. She is not a smoker.     Review of patient's allergies indicates:  No Known Allergies  Past Medical History:   Diagnosis Date    Aneurysm     Arthritis     Chronic migraine without aura, intractable, without status migrainosus     Epilepsy characterized by intractable complex partial seizures     GERD (gastroesophageal reflux disease)     Headache     Hydrocephalus     Hypertension      Past Surgical History:   Procedure Laterality Date    CHOLECYSTECTOMY      VAGUS NERVE STIMULATOR INSERTION      VENTRICULOPERITONEAL SHUNT       Family History   Problem Relation Name Age of Onset    Cancer Mother      Thyroid disease Mother      Hyperlipidemia Father      Breast cancer Maternal Aunt       Social History     Tobacco Use    Smoking status: Never     Passive exposure: Never    Smokeless tobacco: Never   Substance Use Topics    Alcohol use: Never    Drug use: Never     Review of Systems   Constitutional:  Negative for activity change, appetite change and fever.   HENT:  Positive for congestion, postnasal drip, sinus pressure and sore throat.    Respiratory:  Positive for cough. Negative for chest tightness and shortness of breath.    Cardiovascular:  Negative for chest pain.   Gastrointestinal:  Positive for diarrhea. Negative for abdominal distention, abdominal pain, nausea and vomiting.   Musculoskeletal: Negative.        Physical Exam     Initial Vitals [02/04/25  0914]   BP Pulse Resp Temp SpO2   127/79 79 18 98.5 °F (36.9 °C) 98 %      MAP       --         Physical Exam    Vitals reviewed.  Constitutional: She appears well-developed and well-nourished. No distress.   HENT:   Head: Normocephalic and atraumatic.   Right Ear: Tympanic membrane and external ear normal.   Left Ear: Tympanic membrane and external ear normal. Mouth/Throat: Oropharynx is clear and moist.   Neck:   Normal range of motion.  Cardiovascular:  Normal rate and regular rhythm.           Pulmonary/Chest: Breath sounds normal. No respiratory distress.   Abdominal: Abdomen is soft. Bowel sounds are normal.   Musculoskeletal:      Cervical back: Normal range of motion.     Neurological: She is alert and oriented to person, place, and time. GCS score is 15. GCS eye subscore is 4. GCS verbal subscore is 5. GCS motor subscore is 6.   Skin: Skin is warm and dry. Capillary refill takes less than 2 seconds.   Psychiatric: She has a normal mood and affect.         Medical Screening Exam   See Full Note    ED Course   Procedures  Labs Reviewed - No data to display       Imaging Results    None          Medications - No data to display  Medical Decision Making  49-year-old female presents to the emergency department to be evaluated for cough.  She reports cough, headache, congestion and sinus drainage for the past 1 week.  She denies any fever, nausea or vomiting.  Diagnosis:  Viral URI with cough  Prescribed Flonase and Zyrtec    Risk  OTC drugs.                                      Clinical Impression:   Final diagnoses:  [R05.9] Cough, unspecified type (Primary)        ED Disposition Condition    Discharge Stable          ED Prescriptions       Medication Sig Dispense Start Date End Date Auth. Provider    fluticasone propionate (FLONASE) 50 mcg/actuation nasal spray 1 spray (50 mcg total) by Each Nostril route 2 (two) times daily as needed for Rhinitis. 15 g 2/4/2025 -- Brian Estrada FNP    cetirizine (ZYRTEC) 10  MG tablet Take 1 tablet (10 mg total) by mouth once daily. 30 tablet 2/4/2025 2/4/2026 Brian Estrada FNP          Follow-up Information    None          Brian Estrada FNP  02/04/25 0947

## 2025-02-06 ENCOUNTER — OFFICE VISIT (OUTPATIENT)
Dept: GASTROENTEROLOGY | Facility: CLINIC | Age: 50
End: 2025-02-06
Payer: MEDICARE

## 2025-02-06 VITALS
SYSTOLIC BLOOD PRESSURE: 110 MMHG | BODY MASS INDEX: 28.34 KG/M2 | HEIGHT: 62 IN | OXYGEN SATURATION: 97 % | HEART RATE: 69 BPM | DIASTOLIC BLOOD PRESSURE: 69 MMHG | WEIGHT: 154 LBS

## 2025-02-06 DIAGNOSIS — B18.2 CHRONIC HEPATITIS C VIRUS GENOTYPE 1A INFECTION: Primary | ICD-10-CM

## 2025-02-06 PROCEDURE — 99215 OFFICE O/P EST HI 40 MIN: CPT | Mod: PBBFAC

## 2025-02-06 PROCEDURE — 99999 PR PBB SHADOW E&M-EST. PATIENT-LVL V: CPT | Mod: PBBFAC,,,

## 2025-02-06 PROCEDURE — 99214 OFFICE O/P EST MOD 30 MIN: CPT | Mod: S$PBB,,,

## 2025-02-06 NOTE — PROGRESS NOTES
CC:  Hep C follow-up    HPI 49 y.o. white female presents today for follow-up for test cure for hepatitis-C.  Patient has completed her Epclusa over 3 months ago we will plan to test for cure today.  Patient denies any abdominal pain hematochezia or melena.  Denies any nausea vomiting or dysphagia.  Patient was recently diagnosed with flu on Tuesday but is feeling much better.  Patient state she never really ran fever just felt really achy and bad.  Interval  HPI 49 y.o. female is here to follow up on completion of Epculsa treatment for Hep C.  Patient state she took her last dose of Epclusa last week.  Patient state she is doing well she did have lab work yesterday. Labs reviewed no anemia or elevated liver enzymes as of 11/5/24.  Patient denies any abdominal pain, nausea vomiting or dysphagia.  Denies any hematochezia or melena.  Patient does state that she does have menstrual cycle problems but she does see Dr. Shea for this.  Discussed with the patient and her sister that we will wait 12 weeks for test of cure that this is very important that the patient returns for this appointment.  Patient is sister verbalized understanding.  I did discuss with patient and sister about doing a colonoscopy on patient patient state she did not think she can handle the prep sister does not either instructed patient that we could order a Cologuard test patient and sister said that that would be something they would be willing to do.  I did instruct patient is history day but does show positive then we would automatically have to do a colonoscopy, they both voiced understanding.   Medical records reviewed. Additional history supplemented by nursing.     Past Medical History:   Diagnosis Date    Aneurysm     Arthritis     Chronic migraine without aura, intractable, without status migrainosus     Epilepsy characterized by intractable complex partial seizures     GERD (gastroesophageal reflux disease)     Headache      "Hydrocephalus     Hypertension          Review of Systems  General ROS: negative for chills, fever or weight loss  Cardiovascular ROS: no chest pain or dyspnea on exertion  Gastrointestinal ROS: no abdominal pain, change in bowel habits, or black/ bloody stools    Physical Examination  /69 (BP Location: Left arm, Patient Position: Sitting)   Pulse 69   Ht 5' 2" (1.575 m)   Wt 69.9 kg (154 lb)   SpO2 97%   BMI 28.17 kg/m²   General appearance: alert, cooperative, no distress  HENT: Normocephalic, atraumatic, neck symmetrical, no nasal discharge   Lungs: no labored breathing, symmetric chest wall expansion bilaterally  Heart: regular rate and rhythm without rub; no displacement of the PMI   Abdomen: soft, non-tender; bowel sounds normoactive; no organomegaly    Labs:  Lab Results   Component Value Date    WBC 7.56 05/15/2024    HGB 13.2 05/15/2024    HCT 35.9 (L) 05/15/2024    .8 (H) 05/15/2024     05/15/2024       CMP  Sodium   Date Value Ref Range Status   09/25/2024 140 136 - 145 mmol/L Final     Potassium   Date Value Ref Range Status   09/25/2024 4.0 3.5 - 5.1 mmol/L Final     Chloride   Date Value Ref Range Status   09/25/2024 107 98 - 107 mmol/L Final     CO2   Date Value Ref Range Status   09/25/2024 30 21 - 32 mmol/L Final     Glucose   Date Value Ref Range Status   09/25/2024 74 74 - 106 mg/dL Final     BUN   Date Value Ref Range Status   09/25/2024 10 7 - 18 mg/dL Final     Creatinine   Date Value Ref Range Status   09/25/2024 0.70 0.55 - 1.02 mg/dL Final     Calcium   Date Value Ref Range Status   09/25/2024 8.9 8.5 - 10.1 mg/dL Final     Total Protein   Date Value Ref Range Status   09/25/2024 6.7 6.4 - 8.2 g/dL Final     Albumin   Date Value Ref Range Status   09/25/2024 3.0 (L) 3.5 - 5.0 g/dL Final     Bilirubin, Total   Date Value Ref Range Status   09/25/2024 0.4 >0.0 - 1.2 mg/dL Final     Alk Phos   Date Value Ref Range Status   09/25/2024 82 39 - 100 U/L Final     AST   Date " Value Ref Range Status   09/25/2024 11 (L) 15 - 37 U/L Final     ALT   Date Value Ref Range Status   09/25/2024 11 (L) 13 - 56 U/L Final     Anion Gap   Date Value Ref Range Status   09/25/2024 7 7 - 16 mmol/L Final     eGFR    Date Value Ref Range Status   03/20/2020 117       Comment:     The above 10 analytes were performed by [id number] [address] [place] [id number]     eGFR   Date Value Ref Range Status   08/04/2021 74 >=60 mL/min/1.73m² Final       Imaging:  No pertinent imaging    Independently reviewed    Assessment:  Brian Duong 50 yo WF here with:  Chronic hepatitis-C      Plan:  Plan for test of cure today with lab Hep C PCR, CBC and CMP.  If patient is reactive with PCR we will plan to re-treat  Follow-up in 6 months for reimaging of liver or sooner    I spent a total of 30 minutes on the day of the visit.This includes face to face time and non-face to face time preparing to see the patient (eg, review of tests), obtaining and/or reviewing separately obtained history, documenting clinical information in the electronic or other health record, independently interpreting results and communicating results to the patient/family/caregiver, or care coordinator.     Carla Adams, FNP [facility]

## 2025-02-11 ENCOUNTER — TELEPHONE (OUTPATIENT)
Dept: GASTROENTEROLOGY | Facility: CLINIC | Age: 50
End: 2025-02-11
Payer: MEDICARE

## 2025-02-11 NOTE — TELEPHONE ENCOUNTER
----- Message from Kandice Nair NP sent at 2/7/2025  2:26 PM CST -----  Labs look really good no elevated liver enzymes.  We are still waiting on the hepatitis-C PCR to return, once that results we will also let you know what it says.

## 2025-02-12 ENCOUNTER — OFFICE VISIT (OUTPATIENT)
Dept: FAMILY MEDICINE | Facility: CLINIC | Age: 50
End: 2025-02-12
Payer: MEDICARE

## 2025-02-12 VITALS
BODY MASS INDEX: 27.86 KG/M2 | SYSTOLIC BLOOD PRESSURE: 122 MMHG | OXYGEN SATURATION: 99 % | DIASTOLIC BLOOD PRESSURE: 78 MMHG | TEMPERATURE: 98 F | WEIGHT: 151.38 LBS | HEIGHT: 62 IN | RESPIRATION RATE: 16 BRPM | HEART RATE: 69 BPM

## 2025-02-12 DIAGNOSIS — F33.1 MODERATE EPISODE OF RECURRENT MAJOR DEPRESSIVE DISORDER: Chronic | ICD-10-CM

## 2025-02-12 DIAGNOSIS — J02.9 SORE THROAT: Primary | ICD-10-CM

## 2025-02-12 DIAGNOSIS — E80.20 PORPHYRIA, UNSPECIFIED PORPHYRIA TYPE: Chronic | ICD-10-CM

## 2025-02-12 DIAGNOSIS — G40.909 SEIZURE DISORDER: Chronic | ICD-10-CM

## 2025-02-12 DIAGNOSIS — G91.0 COMMUNICATING HYDROCEPHALUS: Chronic | ICD-10-CM

## 2025-02-12 PROCEDURE — 99213 OFFICE O/P EST LOW 20 MIN: CPT | Mod: 25,,, | Performed by: FAMILY MEDICINE

## 2025-02-12 PROCEDURE — 96372 THER/PROPH/DIAG INJ SC/IM: CPT | Mod: ,,, | Performed by: FAMILY MEDICINE

## 2025-02-12 RX ORDER — DEXAMETHASONE SODIUM PHOSPHATE 4 MG/ML
4 INJECTION, SOLUTION INTRA-ARTICULAR; INTRALESIONAL; INTRAMUSCULAR; INTRAVENOUS; SOFT TISSUE ONCE
Status: COMPLETED | OUTPATIENT
Start: 2025-02-12 | End: 2025-02-12

## 2025-02-12 RX ORDER — AZITHROMYCIN 250 MG/1
TABLET, FILM COATED ORAL
Qty: 6 TABLET | Refills: 0 | Status: SHIPPED | OUTPATIENT
Start: 2025-02-12

## 2025-02-12 RX ADMIN — DEXAMETHASONE SODIUM PHOSPHATE 4 MG: 4 INJECTION, SOLUTION INTRA-ARTICULAR; INTRALESIONAL; INTRAMUSCULAR; INTRAVENOUS; SOFT TISSUE at 01:02

## 2025-02-12 NOTE — PROGRESS NOTES
"Subjective     Patient ID: Brian Duong is a 49 y.o. female.    Chief Complaint: Cough and Nasal Congestion (C/O cough,nasal congestion/drainage.)    History of Present Illness    CHIEF COMPLAINT:  Patient presents today for persistent cough and sore throat following flu-like illness    HISTORY OF PRESENT ILLNESS:  She reports exposure to mother who tested positive for influenza prior to onset of symptoms. She notes improvement in cough frequency but it remains present. She experiences persistent sore throat with sensation of mucus being stuck in throat that she is unable to expel. She denies fever, wheezing, or sinus drainage.      ROS:  Constitutional: -fevers  ENT: +sore throat  Respiratory: +cough              Objective   Blood pressure 122/78, pulse 69, temperature 98.2 °F (36.8 °C), temperature source Oral, resp. rate 16, height 5' 2" (1.575 m), weight 68.7 kg (151 lb 6.4 oz), SpO2 99%.    Physical Exam  Constitutional:       Appearance: Normal appearance.   HENT:      Head: Normocephalic and atraumatic.      Right Ear: External ear normal.      Left Ear: External ear normal.      Nose: Congestion and rhinorrhea present.      Mouth/Throat:      Mouth: Mucous membranes are moist.   Eyes:      Conjunctiva/sclera: Conjunctivae normal.      Pupils: Pupils are equal, round, and reactive to light.   Cardiovascular:      Rate and Rhythm: Normal rate and regular rhythm.      Pulses: Normal pulses.      Heart sounds: Normal heart sounds.   Pulmonary:      Effort: Pulmonary effort is normal.      Breath sounds: Normal breath sounds.   Abdominal:      General: Abdomen is flat.   Musculoskeletal:         General: Normal range of motion.      Cervical back: Normal range of motion and neck supple.   Skin:     General: Skin is warm and dry.   Neurological:      General: No focal deficit present.      Mental Status: She is alert and oriented to person, place, and time.   Psychiatric:         Mood and Affect: Mood normal.    "      Behavior: Behavior normal.         Thought Content: Thought content normal.         Judgment: Judgment normal.            Assessment and Plan   Assessment & Plan    IMPRESSION:  - Patient presenting with persistent symptoms 10 days after initial illness onset, including cough and sore throat  - Considered steroid injection (decadron) to address lingering symptoms and improve drainage  - Will prescribe antibiotic (Z-Pack) in addition to steroid injection for comprehensive treatment    UPPER RESPIRATORY INFECTION:  - Monitored the patient's condition, noting symptoms of coughing, sore throat, and mucus accumulation for about a week.  - Observed improvement in cough frequency, with only 3 coughs at home but increased morning coughing.  - Evaluated the patient's condition, noting persistent cough and sore throat without fever or wheezing.  - Assessed that the patient's condition is not worsening but not fully recovered.  - Considered the possibility of strep throat and assessed the severity of initial throat symptoms.  - Prescribed Z-Pack (azithromycin) to be sent to [facility] on the 24th.  - Administered steroid injection (decadron) in office to help with drainage.  - Instructed the patient to contact the office if symptoms do not improve.    COVID-19 EXPOSURE RISK:  - Noted that mother resides at [facility], where there is a COVID-19 outbreak.  - Acknowledged the potential COVID-19 exposure risk from mother's living situation.  - Advised family members to continue taking precautions to avoid potential transmission.    INFLUENZA EXPOSURE RISK:  - Monitored mother's condition, noting flu-like symptoms including coughing, sneezing, and GI issues.  - Noted that mother tested positive for influenza.  - Considered the possibility of influenza transmission from mother.  - Advised family members to continue taking precautions to avoid potential transmission of the illness.         1. Sore throat  -      dexAMETHasone injection 4 mg  -     azithromycin (ZITHROMAX Z-AUBRIE) 250 MG tablet; Take 2 tablets by mouth on Day 1 and then 1 tablet by mouth daily on day 2 through 6.  Dispense: 6 tablet; Refill: 0    2. Porphyria, unspecified porphyria type    3. Communicating hydrocephalus    4. Moderate episode of recurrent major depressive disorder    5. Seizure disorder                 Follow up if symptoms worsen or fail to improve.

## 2025-02-18 DIAGNOSIS — K21.9 GASTRO-ESOPHAGEAL REFLUX DISEASE WITHOUT ESOPHAGITIS: ICD-10-CM

## 2025-02-19 RX ORDER — OMEPRAZOLE 20 MG/1
20 CAPSULE, DELAYED RELEASE ORAL
Qty: 90 CAPSULE | Refills: 3 | Status: SHIPPED | OUTPATIENT
Start: 2025-02-19

## 2025-02-20 ENCOUNTER — OFFICE VISIT (OUTPATIENT)
Dept: NEUROLOGY | Facility: CLINIC | Age: 50
End: 2025-02-20
Payer: MEDICARE

## 2025-02-20 VITALS
DIASTOLIC BLOOD PRESSURE: 63 MMHG | RESPIRATION RATE: 18 BRPM | SYSTOLIC BLOOD PRESSURE: 107 MMHG | WEIGHT: 155.81 LBS | OXYGEN SATURATION: 98 % | HEART RATE: 73 BPM | BODY MASS INDEX: 28.67 KG/M2 | HEIGHT: 62 IN

## 2025-02-20 DIAGNOSIS — Z45.42 ENCOUNTER FOR FITTING AND ADJUSTMENT OF VAGUS NERVE STIMULATOR: ICD-10-CM

## 2025-02-20 DIAGNOSIS — F41.9 ANXIETY: ICD-10-CM

## 2025-02-20 DIAGNOSIS — F32.A DEPRESSION, UNSPECIFIED DEPRESSION TYPE: ICD-10-CM

## 2025-02-20 DIAGNOSIS — G40.309 NONINTRACTABLE GENERALIZED IDIOPATHIC EPILEPSY WITHOUT STATUS EPILEPTICUS: Primary | ICD-10-CM

## 2025-02-20 PROCEDURE — 99999 PR PBB SHADOW E&M-EST. PATIENT-LVL V: CPT | Mod: PBBFAC,,, | Performed by: NURSE PRACTITIONER

## 2025-02-20 PROCEDURE — 99215 OFFICE O/P EST HI 40 MIN: CPT | Mod: PBBFAC | Performed by: NURSE PRACTITIONER

## 2025-02-20 RX ORDER — OXYCODONE AND ACETAMINOPHEN 5; 325 MG/1; MG/1
1 TABLET ORAL EVERY 6 HOURS PRN
COMMUNITY
Start: 2024-11-15

## 2025-02-20 RX ORDER — LEVETIRACETAM 1000 MG/1
1000 TABLET ORAL 2 TIMES DAILY
Qty: 270 TABLET | Refills: 1 | Status: SHIPPED | OUTPATIENT
Start: 2025-02-20

## 2025-02-20 NOTE — PATIENT INSTRUCTIONS
1. Continue the current keppra and lamictal as dircted  2. Lamictal level and CMP next visit  3. Continue the celexa 40mg daily  4. Continue the once monthly Aimovig injections as directed  5. No changes on  VNS performed today      Seizure Precautions:  1. Take medications as directed  2. Avoid open flames and hot surfaces such as fires and grills  3. Avoid elevations such as ladders or stools  4. Avoid swimming alone  5. Make sure getting plenty of sleep, recommend 7-8 hours per day  6. Avoid alcohol and illicit drugs  7. No driving or operating heavy machinery for 6 months after last known seizure

## 2025-02-20 NOTE — PROGRESS NOTES
"    Subjective:       Patient ID: Brian Duong is a 49 y.o. female     Chief Complaint:    Chief Complaint   Patient presents with    Follow-up            History of Present Illness  48 y/o WF followed in clinic for history of epilepsy, s/p VNS, hydrocephalus with history of  shunt placement >15 years ago, and headaches.  Family present with her.    Last visit I had ordered labs on her including lamictal trough level but only got CMP, apparently the lamictal level was never run...  we ordered this as her dosing of lamictal was changed to 200mg bid down from 300 due to level finding of 27 with reported symptoms of drowsiness and syncope.  She is also on keppra 1000mg bid.    There is some component of psychiatric to this as well, she was reporting she "could not see" and "could not move anything" and acted like she was not seeing me but had appropriate reflex respond on eye exam and family reported that patient had ambulated to the car with her walker just fine at home and up to the floor for the clinic visit.  She also STATED she could not stick her tongue out, but was able to speak without difficulty.    Last visti her VNS battery was low and she has since had batter replaced per Dr. Bedolla done in November of 2024.    Headaches are reported as very well.  This was greatly improved after we got her started on the Aimovig injections.     She is also on Celexa for depression and denies any worsening in mood to me, is also on abilify           Review of Systems  Review of Systems   Constitutional:  Negative for diaphoresis and fever.   HENT:  Negative for congestion, hearing loss and tinnitus.    Eyes:  Negative for blurred vision, double vision, photophobia, discharge and redness.   Respiratory:  Negative for cough and shortness of breath.    Cardiovascular:  Negative for chest pain.   Gastrointestinal:  Negative for abdominal pain, nausea and vomiting.   Musculoskeletal:  Negative for back pain, falls, joint pain, " myalgias and neck pain.   Skin:  Negative for itching and rash.   Neurological:  Positive for seizures and headaches. Negative for dizziness, tremors, sensory change, speech change, focal weakness, loss of consciousness and weakness.   Psychiatric/Behavioral:  Positive for depression and memory loss. Negative for hallucinations. The patient does not have insomnia.    All other systems reviewed and are negative.     Objective:     NEUROLOGICAL EXAMINATION:     MENTAL STATUS   Oriented to person, place, and time.   Registration: recalls 3 of 3 objects. Recall at 5 minutes: recalls 3 of 3 objects.   Attention: normal. Concentration: normal.   Speech: speech is normal   Level of consciousness: alert  Knowledge: good and consistent with education.   Normal comprehension.     CRANIAL NERVES     CN II   Visual fields full to confrontation.   Visual acuity: normal  Right visual field deficit: none  Left visual field deficit: none     CN III, IV, VI   Pupils are equal, round, and reactive to light.  Extraocular motions are normal.   Right pupil: Size: 3 mm. Shape: regular. Reactivity: brisk. Consensual response: intact. Accommodation: intact.   Left pupil: Size: 3 mm. Shape: regular. Reactivity: brisk. Consensual response: intact. Accommodation: intact.   CN III: no CN III palsy  CN VI: no CN VI palsy  Nystagmus: none   Diplopia: none  Upgaze: normal  Downgaze: normal  Conjugate gaze: present  Vestibulo-ocular reflex: present    CN V   Facial sensation intact.   Right facial sensation deficit: none  Left facial sensation deficit: none  Right corneal reflex: normal  Left corneal reflex: normal    CN VII   Facial expression full, symmetric.   Right facial weakness: none  Left facial weakness: none  Right taste: normal  Left taste: normal    CN VIII   CN VIII normal.   Hearing: intact    CN IX, X   CN IX normal.   CN X normal.   Palate: symmetric    CN XI   CN XI normal.   Right sternocleidomastoid strength: normal  Left  sternocleidomastoid strength: normal  Right trapezius strength: normal  Left trapezius strength: normal    CN XII   CN XII normal.   Tongue: not atrophic  Fasciculations: absent  Tongue deviation: none    MOTOR EXAM   Muscle bulk: normal  Overall muscle tone: normal  Right arm tone: normal  Left arm tone: increased  Right arm pronator drift: absent  Left arm pronator drift: absent  Right leg tone: normal  Left leg tone: increased    Strength   Right neck flexion: 5/5  Left neck flexion: 5/5  Right neck extension: 5/5  Left neck extension: 5/5  Right deltoid: 5/5  Left deltoid: 4/5  Right biceps: 5/5  Left biceps: 4/5  Right triceps: 5/5  Left triceps: 4/5  Right wrist flexion: 5/5  Left wrist flexion: 4/5  Right wrist extension: 5/5  Left wrist extension: 4/5  Right interossei: 5/5  Left interossei: 4/5  Right iliopsoas: 5/5  Left iliopsoas: 4/5  Right quadriceps: 5/5  Left quadriceps: 4/5  Right hamstrin/5  Left hamstrin/5  Right anterior tibial: 5/5  Left anterior tibial: 4/5  Right posterior tibial: 5/5  Left posterior tibial: 4/5  Right gastroc: 5/5  Left gastroc: 4/5    REFLEXES     Reflexes   Right brachioradialis: 2+  Left brachioradialis: 2+  Right biceps: 2+  Left biceps: 2+  Right triceps: 2+  Left triceps: 2+  Right patellar: 2+  Left patellar: 2+  Right achilles: 2+  Left achilles: 2+  Right plantar: normal  Left plantar: normal  Right Hardin: absent  Left Hardin: absent  Right ankle clonus: absent  Left ankle clonus: absent  Right pendular knee jerk: absent  Left pendular knee jerk: absent    SENSORY EXAM   Light touch normal.   Right arm light touch: normal  Left arm light touch: normal  Right leg light touch: normal  Left leg light touch: normal  Vibration normal.   Right arm vibration: normal  Left arm vibration: normal  Right leg vibration: normal  Left leg vibration: normal  Proprioception normal.   Right arm proprioception: normal  Left arm proprioception: normal  Right leg  proprioception: normal  Left leg proprioception: normal  Pinprick normal.   Right arm pinprick: normal  Left arm pinprick: normal  Right leg pinprick: normal  Left leg pinprick: normal  Graphesthesia: normal  Romberg: negative  Stereognosis: normal    GAIT AND COORDINATION      Coordination   Finger to nose coordination: abnormal  Heel to shin coordination: abnormal  Tandem walking coordination: abnormal    Tremor   Resting tremor: absent  Intention tremor: absent  Action tremor: absent       Physical Exam  Vitals and nursing note reviewed.   Constitutional:       Appearance: Normal appearance.   HENT:      Head: Normocephalic.   Eyes:      Extraocular Movements: Extraocular movements intact and EOM normal.      Pupils: Pupils are equal, round, and reactive to light.   Cardiovascular:      Rate and Rhythm: Normal rate and regular rhythm.   Pulmonary:      Effort: Pulmonary effort is normal.      Breath sounds: Normal breath sounds.   Musculoskeletal:         General: No swelling or tenderness. Normal range of motion.      Cervical back: Normal range of motion and neck supple.      Right lower leg: No edema.      Left lower leg: No edema.   Skin:     General: Skin is warm and dry.      Coloration: Skin is not jaundiced.      Findings: No rash.   Neurological:      General: No focal deficit present.      Mental Status: She is alert and oriented to person, place, and time. Mental status is at baseline.      GCS: GCS eye subscore is 4. GCS verbal subscore is 5. GCS motor subscore is 6.      Cranial Nerves: No cranial nerve deficit.      Sensory: No sensory deficit.      Motor: Motor function is intact. No weakness.      Coordination: Coordination abnormal. Finger-Nose-Finger Test abnormal and Heel to Shin Test abnormal. Romberg Test normal.      Gait: Gait abnormal and tandem walk abnormal.      Deep Tendon Reflexes: Reflexes normal.      Reflex Scores:       Tricep reflexes are 2+ on the right side and 2+ on the left  side.       Bicep reflexes are 2+ on the right side and 2+ on the left side.       Brachioradialis reflexes are 2+ on the right side and 2+ on the left side.       Patellar reflexes are 2+ on the right side and 2+ on the left side.       Achilles reflexes are 2+ on the right side and 2+ on the left side.  Psychiatric:         Mood and Affect: Mood normal.         Speech: Speech normal.         Behavior: Behavior normal.          Assessment:     Problem List Items Addressed This Visit          Neuro    Nonintractable generalized idiopathic epilepsy without status epilepticus - Primary (Chronic)    Relevant Medications    levETIRAcetam (KEPPRA) 1000 MG tablet    Encounter for fitting and adjustment of vagus nerve stimulator       Psychiatric    Anxiety (Chronic)    Depression                    Plan:     1. Need labs next visit  2. VNS device evaluated, no changes were made  3. Continue the lamictal and keppra as directed  4. Notify clinic if any further seizures, as will need to obtain labs    Seizure precautions:  1. Take medications as directed  2. Avoid open flames and hot surfaces  3. Avoid elevations  4. Avoid swimming alone  5. Make sure getting plenty of sleep  6. Avoid alcohol  7. No driving or operating heavy machinery for 6 months after last known seizure

## 2025-02-28 ENCOUNTER — EXTERNAL CHRONIC CARE MANAGEMENT (OUTPATIENT)
Dept: FAMILY MEDICINE | Facility: CLINIC | Age: 50
End: 2025-02-28
Payer: MEDICARE

## 2025-02-28 PROCEDURE — G0511 CCM/BHI BY RHC/FQHC 20MIN MO: HCPCS | Mod: ,,, | Performed by: FAMILY MEDICINE

## 2025-03-26 ENCOUNTER — OFFICE VISIT (OUTPATIENT)
Dept: FAMILY MEDICINE | Facility: CLINIC | Age: 50
End: 2025-03-26
Payer: MEDICARE

## 2025-03-26 VITALS
TEMPERATURE: 98 F | HEART RATE: 74 BPM | BODY MASS INDEX: 28.71 KG/M2 | DIASTOLIC BLOOD PRESSURE: 60 MMHG | RESPIRATION RATE: 18 BRPM | SYSTOLIC BLOOD PRESSURE: 110 MMHG | HEIGHT: 62 IN | WEIGHT: 156 LBS | OXYGEN SATURATION: 99 %

## 2025-03-26 DIAGNOSIS — R04.0 EPISTAXIS: ICD-10-CM

## 2025-03-26 DIAGNOSIS — F33.1 MODERATE EPISODE OF RECURRENT MAJOR DEPRESSIVE DISORDER: Chronic | ICD-10-CM

## 2025-03-26 DIAGNOSIS — F41.9 ANXIETY: Chronic | ICD-10-CM

## 2025-03-26 DIAGNOSIS — Z23 NEED FOR VACCINATION: ICD-10-CM

## 2025-03-26 DIAGNOSIS — F32.89 OTHER DEPRESSION: Primary | Chronic | ICD-10-CM

## 2025-03-26 PROCEDURE — G0009 ADMIN PNEUMOCOCCAL VACCINE: HCPCS | Mod: ,,, | Performed by: FAMILY MEDICINE

## 2025-03-26 PROCEDURE — 90677 PCV20 VACCINE IM: CPT | Mod: ,,, | Performed by: FAMILY MEDICINE

## 2025-03-26 PROCEDURE — 99213 OFFICE O/P EST LOW 20 MIN: CPT | Mod: 25,,, | Performed by: FAMILY MEDICINE

## 2025-03-26 RX ORDER — CITALOPRAM 40 MG/1
40 TABLET, FILM COATED ORAL
Qty: 90 TABLET | Refills: 3 | Status: SHIPPED | OUTPATIENT
Start: 2025-03-26

## 2025-03-26 NOTE — PROGRESS NOTES
"Subjective     Patient ID: Brian Duong is a 49 y.o. female.    Chief Complaint: Follow-up (Followup Menometrorrhagia/) and Sinus Problem (Concerned with nosebleeds )    History of Present Illness    CHIEF COMPLAINT:  Patient presents today for a six-month follow-up visit    ENT:  She reports severe nosebleeds occurring primarily at night when lying down. The bleeding originates from the front of the nose and has been ongoing for over a week. She has history of Flonase use and previous ENT evaluation with scope exam, though timing of visit is uncertain.    MEDICAL HISTORY:  She has completed Hepatitis C treatment.    IMMUNIZATIONS:  Her last tetanus vaccine was in 2016. She received pneumonia vaccines in 2016 and 2018.    CURRENT MEDICATIONS:  She takes Citalopram (Celexa), Cetirizine (Zyrtec) for allergies, and Meclizine (Antivert) for dizziness.      ROS:  ENT: +sinus pressure, +nosebleeds, -bleeding gums  Hematologic/Lymphatic: -easy bruising              Objective   Blood pressure 110/60, pulse 74, temperature 97.7 °F (36.5 °C), temperature source Temporal, resp. rate 18, height 5' 2" (1.575 m), weight 70.8 kg (156 lb), last menstrual period 03/21/2025, SpO2 99%.    Physical Exam  Constitutional:       Appearance: Normal appearance.   HENT:      Head: Normocephalic and atraumatic.      Right Ear: External ear normal.      Left Ear: External ear normal.      Nose: Nose normal.      Mouth/Throat:      Mouth: Mucous membranes are moist.   Eyes:      Conjunctiva/sclera: Conjunctivae normal.      Pupils: Pupils are equal, round, and reactive to light.   Cardiovascular:      Rate and Rhythm: Normal rate and regular rhythm.      Pulses: Normal pulses.      Heart sounds: Normal heart sounds.   Pulmonary:      Effort: Pulmonary effort is normal.      Breath sounds: Normal breath sounds.   Abdominal:      General: Abdomen is flat.      Palpations: Abdomen is soft.   Musculoskeletal:         General: Normal range of " motion.      Cervical back: Normal range of motion and neck supple.   Skin:     General: Skin is warm and dry.   Neurological:      General: No focal deficit present.      Mental Status: She is alert and oriented to person, place, and time.   Psychiatric:         Mood and Affect: Mood normal.         Behavior: Behavior normal.         Thought Content: Thought content normal.         Judgment: Judgment normal.            Assessment and Plan   Assessment & Plan    R04.0 Epistaxis  F32.9 Major depressive disorder, single episode, unspecified  J30.9 Allergic rhinitis, unspecified  R42 Dizziness and giddiness  Z86.19 Personal history of other infectious and parasitic diseases    IMPRESSION:  - Evaluated recurrent nosebleeds, likely originating from the anterior septum.  - Considered potential need for cauterization.  - Reviewed recent lab work from February, including CBC and liver function tests.  - Confirmed completion of Hepatitis C treatment.    EPISTAXIS (NOSEBLEEDS):  - Evaluated the patient's recurrent nosebleeds, likely originating from the anterior nasal septum.  - Discussed potential causes of epistaxis, including dry air and environmental factors.  - Instructed the patient to apply antibiotic ointment or petroleum jelly to the nasal septum before bed to maintain nasal mucosal moisture.  - Demonstrated proper technique for using nasal spray, directing it straight up or towards the lateral nasal wall to minimize septal irritation.  - Considered prescribing Bactroban (mupirocin) ointment for intranasal application.  - Referred the patient to an otolaryngologist for further evaluation and possible cauterization of recurrent epistaxis.    MAJOR DEPRESSIVE DISORDER:  - Renewed prescription for citalopram (Celexa) to continue treatment for depression.    ALLERGIC RHINITIS:  - Renewed prescription for cetirizine (Zyrtec) for allergy management.  - Discussed the option of doubling the Claritin (loratadine) dose if  needed for symptom control.  - Demonstrated proper technique for using nasal spray, directing it straight up or towards the lateral nasal wall to minimize septal irritation.    DIZZINESS:  - Renewed prescription for meclizine (Antivert) to manage dizziness symptoms.  - Noted that the current prescription will  in a few weeks, but the patient still has some medication remaining.    HISTORY OF HEPATITIS C:  - Noted the patient's history of Hepatitis C, currently in remission.  - Scheduled follow-up visit for April or May to monitor Hepatitis C status.    PREVENTIVE CARE:  - Educated the patient about the new pneumococcal vaccine (Prevnar 20) and its long-lasting immunity.  - Discussed the benefits of the state vaccine database in preventing duplicate vaccinations.  - Administered Prevnar 20 (pneumococcal vaccine) in the office.  - Contact the office to discuss flu vaccine administration at next appointment.  - Contact the office to discuss tetanus vaccine at next appointment (due fall of next year).    FOLLOW-UP:  - Follow up in 6 months in early October.         1. Other depression    2. Epistaxis  -     Ambulatory referral/consult to ENT; Future; Expected date: 2025    3. Need for vaccination  -     pneumoc 20-cameron conj-dip cr(PF) (PREVNAR-20 (PF)) injection Syrg 0.5 mL                 Follow up in about 7 months (around 10/13/2025) for for recheck and flu shot.

## 2025-04-17 ENCOUNTER — OFFICE VISIT (OUTPATIENT)
Dept: OBSTETRICS AND GYNECOLOGY | Facility: CLINIC | Age: 50
End: 2025-04-17
Payer: MEDICARE

## 2025-04-17 VITALS
BODY MASS INDEX: 28.72 KG/M2 | WEIGHT: 157 LBS | HEART RATE: 69 BPM | SYSTOLIC BLOOD PRESSURE: 129 MMHG | DIASTOLIC BLOOD PRESSURE: 64 MMHG

## 2025-04-17 DIAGNOSIS — Z01.419 ENCOUNTER FOR WELL WOMAN EXAM WITH ROUTINE GYNECOLOGICAL EXAM: Primary | ICD-10-CM

## 2025-04-17 DIAGNOSIS — N92.1 MENOMETRORRHAGIA: Chronic | ICD-10-CM

## 2025-04-17 PROCEDURE — 99999 PR PBB SHADOW E&M-EST. PATIENT-LVL IV: CPT | Mod: PBBFAC,,, | Performed by: STUDENT IN AN ORGANIZED HEALTH CARE EDUCATION/TRAINING PROGRAM

## 2025-04-17 PROCEDURE — G0101 CA SCREEN;PELVIC/BREAST EXAM: HCPCS | Mod: S$PBB,,, | Performed by: STUDENT IN AN ORGANIZED HEALTH CARE EDUCATION/TRAINING PROGRAM

## 2025-04-17 PROCEDURE — 99214 OFFICE O/P EST MOD 30 MIN: CPT | Mod: PBBFAC | Performed by: STUDENT IN AN ORGANIZED HEALTH CARE EDUCATION/TRAINING PROGRAM

## 2025-04-17 RX ORDER — NORETHINDRONE 5 MG/1
5 TABLET ORAL DAILY
Qty: 30 TABLET | Refills: 11 | Status: CANCELLED | OUTPATIENT
Start: 2025-04-17 | End: 2026-04-17

## 2025-06-12 ENCOUNTER — OFFICE VISIT (OUTPATIENT)
Dept: FAMILY MEDICINE | Facility: CLINIC | Age: 50
End: 2025-06-12
Payer: MEDICARE

## 2025-06-12 VITALS
HEIGHT: 62 IN | DIASTOLIC BLOOD PRESSURE: 60 MMHG | RESPIRATION RATE: 18 BRPM | WEIGHT: 157 LBS | SYSTOLIC BLOOD PRESSURE: 112 MMHG | BODY MASS INDEX: 28.89 KG/M2 | HEART RATE: 78 BPM | OXYGEN SATURATION: 98 % | TEMPERATURE: 97 F

## 2025-06-12 DIAGNOSIS — M79.89 LEG SWELLING: Primary | Chronic | ICD-10-CM

## 2025-06-12 DIAGNOSIS — M79.18 PAIN IN RIGHT BUTTOCK: ICD-10-CM

## 2025-06-12 DIAGNOSIS — R04.0 EPISTAXIS: ICD-10-CM

## 2025-06-12 RX ORDER — DEXAMETHASONE SODIUM PHOSPHATE 4 MG/ML
4 INJECTION, SOLUTION INTRA-ARTICULAR; INTRALESIONAL; INTRAMUSCULAR; INTRAVENOUS; SOFT TISSUE ONCE
Status: COMPLETED | OUTPATIENT
Start: 2025-06-12 | End: 2025-06-12

## 2025-06-12 RX ORDER — METHOCARBAMOL 500 MG/1
500 TABLET, FILM COATED ORAL 3 TIMES DAILY PRN
Qty: 60 TABLET | Refills: 2 | Status: SHIPPED | OUTPATIENT
Start: 2025-06-12

## 2025-06-12 RX ADMIN — DEXAMETHASONE SODIUM PHOSPHATE 4 MG: 4 INJECTION, SOLUTION INTRA-ARTICULAR; INTRALESIONAL; INTRAMUSCULAR; INTRAVENOUS; SOFT TISSUE at 03:06

## 2025-06-20 PROBLEM — M79.89 LEG SWELLING: Chronic | Status: ACTIVE | Noted: 2025-06-20

## 2025-06-20 NOTE — PROGRESS NOTES
"Subjective     Patient ID: Brian Duong is a 49 y.o. female.    Chief Complaint: Back Pain (Low back pain. Concerned with pulled muscle. Denies any urinary symptoms )    History of Present Illness    CHIEF COMPLAINT:  Patient presents today for left hip pain    MUSCULOSKELETAL:  She reports left hip pain for the past couple weeks, occurring with position changes such as getting up or bending over. Her back began spasming during a race. She denies recent falls, lifting activities, or bending activities. She denies numbness, radiating pain, and bladder issues.    VASCULAR:  She reports chronic leg swelling. She previously underwent vein procedures which provided some benefit. She was prescribed compression hose which helped initially but discontinued use after the vein procedure.    ENT:  She reports infrequent but severe nosebleeds resulting in significant bleeding onto pillow.    MEDICATIONS:  She takes Tylenol as needed for pain management.      ROS:  Cardiovascular: +lower extremity edema  Musculoskeletal: +joint pain, +muscle spasms  Neurological: -numbness              Objective   Blood pressure 112/60, pulse 78, temperature 97 °F (36.1 °C), temperature source Temporal, resp. rate 18, height 5' 2" (1.575 m), weight 71.2 kg (157 lb), last menstrual period 05/26/2025, SpO2 98%.    Physical Exam  Constitutional:       Appearance: Normal appearance.   HENT:      Head: Normocephalic and atraumatic.      Right Ear: External ear normal.      Left Ear: External ear normal.      Nose: Nose normal.      Mouth/Throat:      Mouth: Mucous membranes are moist.   Eyes:      Conjunctiva/sclera: Conjunctivae normal.      Pupils: Pupils are equal, round, and reactive to light.   Cardiovascular:      Rate and Rhythm: Normal rate and regular rhythm.      Pulses: Normal pulses.      Heart sounds: Normal heart sounds.   Pulmonary:      Effort: Pulmonary effort is normal.      Breath sounds: Normal breath sounds.   Abdominal:      " General: Abdomen is flat.      Palpations: Abdomen is soft.   Musculoskeletal:         General: Normal range of motion.      Cervical back: Normal range of motion and neck supple.   Skin:     General: Skin is warm and dry.   Neurological:      General: No focal deficit present.      Mental Status: She is alert and oriented to person, place, and time.   Psychiatric:         Mood and Affect: Mood normal.         Behavior: Behavior normal.         Thought Content: Thought content normal.         Judgment: Judgment normal.            Assessment and Plan   Assessment & Plan    M25.552 Pain in left hip  I89.0 Lymphedema, not elsewhere classified  R04.0 Epistaxis    LEFT HIP PAIN:  - Evaluated patient's left hip pain present for the past month, with no recent falls, back pain, numbness, pain down the leg, or bladder issues.  - Pain occurs when attempting to get up, perform activities, or bend over.  - Assessment indicates likely nerve or muscle spasm etiology.  - Administered steroid injection for pain management and prescribed muscle relaxer to be filled at the original pharmacy on [address].  - Recommend anti-inflammatories and discussed possible stretches and physical therapy as additional treatment options.    LYMPHEDEMA:  - Monitored patient's leg swelling, which presents as lymphedema type swelling rather than pitting edema.  - Patient reports legs have always been swollen, and has history of vein procedures.  - Recommend using an exercise bike to improve circulation, elevating legs when sitting, and increasing overall physical activity to help reduce swelling.  - Referred patient to Dr. Jorge Luis Pedraza (vein specialist) for further evaluation and management.    EPISTAXIS (NOSEBLEEDS):  - Monitored patient's nosebleeds, which though not severe lately, are significant when they occur.  - Assessment indicates recurring nosebleeds likely require cauterization.  - Discussed possible causes including medication,  allergies, or blood vessels needing treatment.  - Referred patient to ENT (Dr. Kevin Stephens) for evaluation and management.  - Instructed office staff to schedule ENT appointment and to contact patient's father at [facility] [employer] if unable to reach patient directly.         1. Leg swelling  -     Ambulatory referral/consult to [facility]; Future; Expected date: 06/19/2025    2. Epistaxis  -     Ambulatory referral/consult to ENT; Future; Expected date: 06/19/2025    3. Pain in right buttock  -     dexAMETHasone injection 4 mg  -     methocarbamoL (ROBAXIN) 500 MG Tab; Take 1 tablet (500 mg total) by mouth 3 (three) times daily as needed (spasms).  Dispense: 60 tablet; Refill: 2                 Follow up if symptoms worsen or fail to improve.

## 2025-06-24 ENCOUNTER — OFFICE VISIT (OUTPATIENT)
Dept: NEUROLOGY | Facility: CLINIC | Age: 50
End: 2025-06-24
Payer: MEDICARE

## 2025-06-24 VITALS
SYSTOLIC BLOOD PRESSURE: 110 MMHG | WEIGHT: 158.44 LBS | RESPIRATION RATE: 18 BRPM | HEIGHT: 62 IN | OXYGEN SATURATION: 95 % | BODY MASS INDEX: 29.16 KG/M2 | DIASTOLIC BLOOD PRESSURE: 66 MMHG | HEART RATE: 76 BPM

## 2025-06-24 DIAGNOSIS — Z45.42 ENCOUNTER FOR FITTING AND ADJUSTMENT OF VAGUS NERVE STIMULATOR: ICD-10-CM

## 2025-06-24 DIAGNOSIS — F32.A DEPRESSION, UNSPECIFIED DEPRESSION TYPE: ICD-10-CM

## 2025-06-24 DIAGNOSIS — G40.309 NONINTRACTABLE GENERALIZED IDIOPATHIC EPILEPSY WITHOUT STATUS EPILEPTICUS: Primary | ICD-10-CM

## 2025-06-24 DIAGNOSIS — F41.9 ANXIETY: ICD-10-CM

## 2025-06-24 PROCEDURE — 99213 OFFICE O/P EST LOW 20 MIN: CPT | Mod: S$PBB,,, | Performed by: NURSE PRACTITIONER

## 2025-06-24 PROCEDURE — 99999 PR PBB SHADOW E&M-EST. PATIENT-LVL V: CPT | Mod: PBBFAC,,, | Performed by: NURSE PRACTITIONER

## 2025-06-24 PROCEDURE — 99215 OFFICE O/P EST HI 40 MIN: CPT | Mod: PBBFAC | Performed by: NURSE PRACTITIONER

## 2025-06-24 RX ORDER — LEVETIRACETAM 1000 MG/1
1000 TABLET ORAL 2 TIMES DAILY
Qty: 270 TABLET | Refills: 1 | Status: SHIPPED | OUTPATIENT
Start: 2025-06-24

## 2025-06-24 RX ORDER — LAMOTRIGINE 200 MG/1
200 TABLET ORAL 2 TIMES DAILY
Qty: 180 TABLET | Refills: 1 | Status: SHIPPED | OUTPATIENT
Start: 2025-06-24

## 2025-06-24 NOTE — PATIENT INSTRUCTIONS
1. Continue the current keppra and lamictal as dircted  2. Lamictal level and CMP  3. Continue the celexa 40mg daily  4. Continue the once monthly Aimovig injections as directed  5. No changes on  VNS performed today      Seizure Precautions:  1. Take medications as directed  2. Avoid open flames and hot surfaces such as fires and grills  3. Avoid elevations such as ladders or stools  4. Avoid swimming alone  5. Make sure getting plenty of sleep, recommend 7-8 hours per day  6. Avoid alcohol and illicit drugs  7. No driving or operating heavy machinery for 6 months after last known seizure

## 2025-06-24 NOTE — PROGRESS NOTES
Subjective:       Patient ID: Brian Duong is a 49 y.o. female     Chief Complaint:    Chief Complaint   Patient presents with    Follow-up    Seizures            History of Present Illness  48 y/o WF followed in clinic for history of epilepsy, s/p VNS, hydrocephalus with history of  shunt placement >15 years ago, and headaches.  Family present with her.    Last visit I had ordered labs on her including lamictal trough level but only got CMP, apparently the lamictal level was never run...  we ordered this as her dosing of lamictal was changed to 200mg bid down from 300 due to level finding of 27 with reported symptoms of drowsiness and syncope.  She is also on keppra 1000mg bid.    Last visti her VNS battery was low and she has since had batter replaced per Dr. Bedolla done in November of 2024.    Headaches are reported as very well.  This was greatly improved after we got her started on the Aimovig injections.     She is also on Celexa for depression and denies any worsening in mood to me, is also on abilify           Review of Systems  Review of Systems   Constitutional:  Negative for diaphoresis and fever.   HENT:  Negative for congestion, hearing loss and tinnitus.    Eyes:  Negative for blurred vision, double vision, photophobia, discharge and redness.   Respiratory:  Negative for cough and shortness of breath.    Cardiovascular:  Negative for chest pain.   Gastrointestinal:  Negative for abdominal pain, nausea and vomiting.   Musculoskeletal:  Negative for back pain, falls, joint pain, myalgias and neck pain.   Skin:  Negative for itching and rash.   Neurological:  Positive for seizures and headaches. Negative for dizziness, tremors, sensory change, speech change, focal weakness, loss of consciousness and weakness.   Psychiatric/Behavioral:  Positive for depression and memory loss. Negative for hallucinations. The patient does not have insomnia.    All other systems reviewed and are negative.      Objective:     NEUROLOGICAL EXAMINATION:     MENTAL STATUS   Oriented to person, place, and time.   Registration: recalls 3 of 3 objects. Recall at 5 minutes: recalls 3 of 3 objects.   Attention: normal. Concentration: normal.   Speech: speech is normal   Level of consciousness: alert  Knowledge: good and consistent with education.   Normal comprehension.     CRANIAL NERVES     CN II   Visual fields full to confrontation.   Visual acuity: normal  Right visual field deficit: none  Left visual field deficit: none     CN III, IV, VI   Pupils are equal, round, and reactive to light.  Extraocular motions are normal.   Right pupil: Size: 3 mm. Shape: regular. Reactivity: brisk. Consensual response: intact. Accommodation: intact.   Left pupil: Size: 3 mm. Shape: regular. Reactivity: brisk. Consensual response: intact. Accommodation: intact.   CN III: no CN III palsy  CN VI: no CN VI palsy  Nystagmus: none   Diplopia: none  Upgaze: normal  Downgaze: normal  Conjugate gaze: present  Vestibulo-ocular reflex: present    CN V   Facial sensation intact.   Right facial sensation deficit: none  Left facial sensation deficit: none  Right corneal reflex: normal  Left corneal reflex: normal    CN VII   Facial expression full, symmetric.   Right facial weakness: none  Left facial weakness: none  Right taste: normal  Left taste: normal    CN VIII   CN VIII normal.   Hearing: intact    CN IX, X   CN IX normal.   CN X normal.   Palate: symmetric    CN XI   CN XI normal.   Right sternocleidomastoid strength: normal  Left sternocleidomastoid strength: normal  Right trapezius strength: normal  Left trapezius strength: normal    CN XII   CN XII normal.   Tongue: not atrophic  Fasciculations: absent  Tongue deviation: none    MOTOR EXAM   Muscle bulk: normal  Overall muscle tone: normal  Right arm tone: normal  Left arm tone: increased  Right arm pronator drift: absent  Left arm pronator drift: absent  Right leg tone: normal  Left leg tone:  increased    Strength   Right neck flexion: 5/5  Left neck flexion: 5/5  Right neck extension: 5/5  Left neck extension: 5/5  Right deltoid: 5/5  Left deltoid: 4/5  Right biceps: 5/5  Left biceps: 4/5  Right triceps: 5/5  Left triceps: 4/5  Right wrist flexion: 5/5  Left wrist flexion: 4/5  Right wrist extension: 5/5  Left wrist extension: 4/5  Right interossei: 5/5  Left interossei: 4/5  Right iliopsoas: 5/5  Left iliopsoas: 4/5  Right quadriceps: 5/5  Left quadriceps: 4/5  Right hamstrin/5  Left hamstrin/5  Right anterior tibial: 5/5  Left anterior tibial: 4/5  Right posterior tibial: 5/5  Left posterior tibial: 4/5  Right gastroc: 5/5  Left gastroc: 4/5    REFLEXES     Reflexes   Right brachioradialis: 2+  Left brachioradialis: 2+  Right biceps: 2+  Left biceps: 2+  Right triceps: 2+  Left triceps: 2+  Right patellar: 2+  Left patellar: 2+  Right achilles: 2+  Left achilles: 2+  Right plantar: normal  Left plantar: normal  Right Hardin: absent  Left Hardin: absent  Right ankle clonus: absent  Left ankle clonus: absent  Right pendular knee jerk: absent  Left pendular knee jerk: absent    SENSORY EXAM   Light touch normal.   Right arm light touch: normal  Left arm light touch: normal  Right leg light touch: normal  Left leg light touch: normal  Vibration normal.   Right arm vibration: normal  Left arm vibration: normal  Right leg vibration: normal  Left leg vibration: normal  Proprioception normal.   Right arm proprioception: normal  Left arm proprioception: normal  Right leg proprioception: normal  Left leg proprioception: normal  Pinprick normal.   Right arm pinprick: normal  Left arm pinprick: normal  Right leg pinprick: normal  Left leg pinprick: normal  Graphesthesia: normal  Romberg: negative  Stereognosis: normal    GAIT AND COORDINATION      Coordination   Finger to nose coordination: abnormal  Heel to shin coordination: abnormal  Tandem walking coordination: abnormal    Tremor   Resting tremor:  absent  Intention tremor: absent  Action tremor: absent       Physical Exam  Vitals and nursing note reviewed.   Constitutional:       Appearance: Normal appearance.   HENT:      Head: Normocephalic.   Eyes:      Extraocular Movements: Extraocular movements intact and EOM normal.      Pupils: Pupils are equal, round, and reactive to light.   Cardiovascular:      Rate and Rhythm: Normal rate and regular rhythm.   Pulmonary:      Effort: Pulmonary effort is normal.      Breath sounds: Normal breath sounds.   Musculoskeletal:         General: No swelling or tenderness. Normal range of motion.      Cervical back: Normal range of motion and neck supple.      Right lower leg: No edema.      Left lower leg: No edema.   Skin:     General: Skin is warm and dry.      Coloration: Skin is not jaundiced.      Findings: No rash.   Neurological:      General: No focal deficit present.      Mental Status: She is alert and oriented to person, place, and time. Mental status is at baseline.      GCS: GCS eye subscore is 4. GCS verbal subscore is 5. GCS motor subscore is 6.      Cranial Nerves: No cranial nerve deficit.      Sensory: No sensory deficit.      Motor: Motor function is intact. No weakness.      Coordination: Coordination abnormal. Finger-Nose-Finger Test abnormal and Heel to Shin Test abnormal. Romberg Test normal.      Gait: Gait abnormal and tandem walk abnormal.      Deep Tendon Reflexes: Reflexes normal.      Reflex Scores:       Tricep reflexes are 2+ on the right side and 2+ on the left side.       Bicep reflexes are 2+ on the right side and 2+ on the left side.       Brachioradialis reflexes are 2+ on the right side and 2+ on the left side.       Patellar reflexes are 2+ on the right side and 2+ on the left side.       Achilles reflexes are 2+ on the right side and 2+ on the left side.  Psychiatric:         Mood and Affect: Mood normal.         Speech: Speech normal.         Behavior: Behavior normal.           Assessment:     Problem List Items Addressed This Visit          Neuro    Nonintractable generalized idiopathic epilepsy without status epilepticus - Primary (Chronic)    Relevant Medications    lamoTRIgine (LAMICTAL) 200 MG tablet    levETIRAcetam (KEPPRA) 1000 MG tablet    Other Relevant Orders    Lamotrigine Level    Comprehensive Metabolic Panel    Encounter for fitting and adjustment of vagus nerve stimulator       Psychiatric    Anxiety (Chronic)    Depression                      Plan:     1. Need labs next visit  2. VNS device evaluated, no changes were made  3. Continue the lamictal and keppra as directed  4. Notify clinic if any further seizures, as will need to obtain labs    Seizure precautions:  1. Take medications as directed  2. Avoid open flames and hot surfaces  3. Avoid elevations  4. Avoid swimming alone  5. Make sure getting plenty of sleep  6. Avoid alcohol  7. No driving or operating heavy machinery for 6 months after last known seizure

## 2025-06-26 ENCOUNTER — OFFICE VISIT (OUTPATIENT)
Dept: OTOLARYNGOLOGY | Facility: CLINIC | Age: 50
End: 2025-06-26
Payer: MEDICARE

## 2025-06-26 VITALS — BODY MASS INDEX: 29.08 KG/M2 | WEIGHT: 158 LBS | HEIGHT: 62 IN

## 2025-06-26 DIAGNOSIS — R04.0 EPISTAXIS: Primary | ICD-10-CM

## 2025-06-26 PROCEDURE — 99999 PR PBB SHADOW E&M-EST. PATIENT-LVL IV: CPT | Mod: PBBFAC,,, | Performed by: OTOLARYNGOLOGY

## 2025-06-26 PROCEDURE — 99214 OFFICE O/P EST MOD 30 MIN: CPT | Mod: PBBFAC | Performed by: OTOLARYNGOLOGY

## 2025-06-26 PROCEDURE — 99204 OFFICE O/P NEW MOD 45 MIN: CPT | Mod: S$PBB,25,, | Performed by: OTOLARYNGOLOGY

## 2025-06-26 PROCEDURE — 30901 CONTROL OF NOSEBLEED: CPT | Mod: S$PBB,RT,, | Performed by: OTOLARYNGOLOGY

## 2025-06-26 PROCEDURE — 30901 CONTROL OF NOSEBLEED: CPT | Mod: PBBFAC,RT | Performed by: OTOLARYNGOLOGY

## 2025-06-26 NOTE — PROGRESS NOTES
Subjective:       Patient ID: Brian Duong is a 49 y.o. female.    Chief Complaint: Epistaxis (Patient complains of having nose bleeds 10-12 years. States she uses nasal sprays as directed for nasal congestion.)    Epistaxis       Review of Systems   HENT:  Positive for congestion and nosebleeds.    All other systems reviewed and are negative.      Objective:      Physical Exam  General: NAD  Head: Normocephalic, atraumatic, no facial asymmetry/normal strength,  Ears: Both auricules normal in appearance, w/o deformities tympanic membranes normal external auditory canals normal  Nose: External nose w/o deformities normal turbinates no drainage or inflammation right septum cauterized   Oral Cavity: Lips, gums, floor of mouth, tongue hard palate, and buccal mucosa without mass/lesion  Oropharynx: Mucosa pink and moist, soft palate, posterior pharynx and oropharyngeal wall without mass/lesion  Neck: Supple, symmetric, trachea midline, no palpable mass/lesion, no palpable cervical lymphadenopathy  Skin: Warm and dry, no concerning lesions  Respiratory: Respirations even, unlabored      Verbal assent was obtained. The nasal cavity was anesthetized  The prominent vessels on the right upper nasal septum were cauterized with silver nitrate without impacting the inferior turbinate at the same point so as to minimize the risk of synechiae formation. The patient tolerated this procedure well without complication. The patient was counseled that there may be some drainage of black or grey material over the next couple of days - this is normal and reflects the composition of the cautery agent which was noted to be silver nitrate.  Assessment:       1. Epistaxis        Plan:       Home control discussed   Stops flonase

## 2025-07-29 DIAGNOSIS — K21.9 GASTROESOPHAGEAL REFLUX DISEASE, UNSPECIFIED WHETHER ESOPHAGITIS PRESENT: ICD-10-CM

## 2025-07-31 RX ORDER — FAMOTIDINE 20 MG/1
20 TABLET, FILM COATED ORAL 2 TIMES DAILY
Qty: 180 TABLET | Refills: 3 | Status: SHIPPED | OUTPATIENT
Start: 2025-07-31